# Patient Record
Sex: FEMALE | Race: BLACK OR AFRICAN AMERICAN | NOT HISPANIC OR LATINO | Employment: OTHER | URBAN - METROPOLITAN AREA
[De-identification: names, ages, dates, MRNs, and addresses within clinical notes are randomized per-mention and may not be internally consistent; named-entity substitution may affect disease eponyms.]

---

## 2019-07-25 ENCOUNTER — HOSPITAL ENCOUNTER (EMERGENCY)
Facility: HOSPITAL | Age: 84
Discharge: HOME | End: 2019-07-26
Attending: EMERGENCY MEDICINE | Admitting: EMERGENCY MEDICINE
Payer: MEDICARE

## 2019-07-25 VITALS
OXYGEN SATURATION: 100 % | WEIGHT: 110 LBS | RESPIRATION RATE: 63 BRPM | SYSTOLIC BLOOD PRESSURE: 162 MMHG | HEIGHT: 64 IN | BODY MASS INDEX: 18.78 KG/M2 | TEMPERATURE: 98 F | HEART RATE: 65 BPM | DIASTOLIC BLOOD PRESSURE: 75 MMHG

## 2019-07-25 DIAGNOSIS — R19.00 PELVIC MASS: ICD-10-CM

## 2019-07-25 DIAGNOSIS — R10.9 ABDOMINAL PAIN: ICD-10-CM

## 2019-07-25 DIAGNOSIS — N85.8 UTERINE MASS: Primary | ICD-10-CM

## 2019-07-25 LAB
ALBUMIN SERPL BCP-MCNC: 4 G/DL (ref 3.5–5.2)
ALP SERPL-CCNC: 101 U/L (ref 55–135)
ALT SERPL W/O P-5'-P-CCNC: 18 U/L (ref 10–44)
ANION GAP SERPL CALC-SCNC: 8 MMOL/L (ref 8–16)
AST SERPL-CCNC: 20 U/L (ref 10–40)
BASOPHILS # BLD AUTO: 0.02 K/UL (ref 0–0.2)
BASOPHILS NFR BLD: 0.6 % (ref 0–1.9)
BILIRUB SERPL-MCNC: 0.5 MG/DL (ref 0.1–1)
BILIRUB UR QL STRIP: NEGATIVE
BUN SERPL-MCNC: 11 MG/DL (ref 10–30)
CALCIUM SERPL-MCNC: 10.3 MG/DL (ref 8.7–10.5)
CHLORIDE SERPL-SCNC: 107 MMOL/L (ref 95–110)
CLARITY UR REFRACT.AUTO: ABNORMAL
CO2 SERPL-SCNC: 26 MMOL/L (ref 23–29)
COLOR UR AUTO: YELLOW
CREAT SERPL-MCNC: 0.6 MG/DL (ref 0.5–1.4)
DIFFERENTIAL METHOD: ABNORMAL
EOSINOPHIL # BLD AUTO: 0.1 K/UL (ref 0–0.5)
EOSINOPHIL NFR BLD: 3 % (ref 0–8)
ERYTHROCYTE [DISTWIDTH] IN BLOOD BY AUTOMATED COUNT: 14.8 % (ref 11.5–14.5)
EST. GFR  (AFRICAN AMERICAN): >60 ML/MIN/1.73 M^2
EST. GFR  (NON AFRICAN AMERICAN): >60 ML/MIN/1.73 M^2
GLUCOSE SERPL-MCNC: 86 MG/DL (ref 70–110)
GLUCOSE UR QL STRIP: NEGATIVE
HCT VFR BLD AUTO: 35.2 % (ref 37–48.5)
HGB BLD-MCNC: 11.1 G/DL (ref 12–16)
HGB UR QL STRIP: NEGATIVE
KETONES UR QL STRIP: NEGATIVE
LEUKOCYTE ESTERASE UR QL STRIP: NEGATIVE
LIPASE SERPL-CCNC: 27 U/L (ref 4–60)
LYMPHOCYTES # BLD AUTO: 1.6 K/UL (ref 1–4.8)
LYMPHOCYTES NFR BLD: 49.2 % (ref 18–48)
MCH RBC QN AUTO: 30.6 PG (ref 27–31)
MCHC RBC AUTO-ENTMCNC: 31.5 G/DL (ref 32–36)
MCV RBC AUTO: 97 FL (ref 82–98)
MONOCYTES # BLD AUTO: 0.3 K/UL (ref 0.3–1)
MONOCYTES NFR BLD: 8.8 % (ref 4–15)
NEUTROPHILS # BLD AUTO: 1.3 K/UL (ref 1.8–7.7)
NEUTROPHILS NFR BLD: 38.4 % (ref 38–73)
NITRITE UR QL STRIP: NEGATIVE
PH UR STRIP: 7 [PH] (ref 5–8)
PLATELET # BLD AUTO: 271 K/UL (ref 150–350)
PMV BLD AUTO: 9.9 FL (ref 9.2–12.9)
POTASSIUM SERPL-SCNC: 4.5 MMOL/L (ref 3.5–5.1)
PROT SERPL-MCNC: 7.1 G/DL (ref 6–8.4)
PROT UR QL STRIP: NEGATIVE
RBC # BLD AUTO: 3.63 M/UL (ref 4–5.4)
SODIUM SERPL-SCNC: 141 MMOL/L (ref 136–145)
SP GR UR STRIP: 1.01 (ref 1–1.03)
URN SPEC COLLECT METH UR: ABNORMAL
UROBILINOGEN UR STRIP-ACNC: NEGATIVE EU/DL
WBC # BLD AUTO: 3.29 K/UL (ref 3.9–12.7)

## 2019-07-25 PROCEDURE — 63600175 PHARM REV CODE 636 W HCPCS: Mod: ER | Performed by: EMERGENCY MEDICINE

## 2019-07-25 PROCEDURE — 81003 URINALYSIS AUTO W/O SCOPE: CPT | Mod: ER

## 2019-07-25 PROCEDURE — 93010 EKG 12-LEAD: ICD-10-PCS | Mod: ,,, | Performed by: INTERNAL MEDICINE

## 2019-07-25 PROCEDURE — 93005 ELECTROCARDIOGRAM TRACING: CPT | Mod: ER

## 2019-07-25 PROCEDURE — 99900035 HC TECH TIME PER 15 MIN (STAT): Mod: ER

## 2019-07-25 PROCEDURE — 83690 ASSAY OF LIPASE: CPT | Mod: ER

## 2019-07-25 PROCEDURE — 25500020 PHARM REV CODE 255: Mod: ER | Performed by: EMERGENCY MEDICINE

## 2019-07-25 PROCEDURE — 80053 COMPREHEN METABOLIC PANEL: CPT | Mod: ER

## 2019-07-25 PROCEDURE — 96360 HYDRATION IV INFUSION INIT: CPT

## 2019-07-25 PROCEDURE — 99285 EMERGENCY DEPT VISIT HI MDM: CPT | Mod: 25

## 2019-07-25 PROCEDURE — 85025 COMPLETE CBC W/AUTO DIFF WBC: CPT | Mod: ER

## 2019-07-25 PROCEDURE — 93010 ELECTROCARDIOGRAM REPORT: CPT | Mod: ,,, | Performed by: INTERNAL MEDICINE

## 2019-07-25 RX ORDER — FERROUS SULFATE 325(65) MG
1 TABLET ORAL DAILY
COMMUNITY
Start: 2015-09-02 | End: 2020-12-08

## 2019-07-25 RX ORDER — AMLODIPINE AND OLMESARTAN MEDOXOMIL 10; 20 MG/1; MG/1
1 TABLET ORAL DAILY
COMMUNITY
End: 2020-05-26 | Stop reason: SDUPTHER

## 2019-07-25 RX ORDER — ASPIRIN 81 MG/1
81 TABLET ORAL DAILY
COMMUNITY
End: 2021-05-31

## 2019-07-25 RX ORDER — TRAMADOL HYDROCHLORIDE 50 MG/1
50 TABLET ORAL EVERY 6 HOURS PRN
Qty: 18 TABLET | Refills: 0 | Status: SHIPPED | OUTPATIENT
Start: 2019-07-25 | End: 2019-07-25

## 2019-07-25 RX ORDER — HYDROCODONE BITARTRATE AND ACETAMINOPHEN 5; 325 MG/1; MG/1
1 TABLET ORAL EVERY 6 HOURS PRN
Qty: 18 TABLET | Refills: 0 | Status: SHIPPED | OUTPATIENT
Start: 2019-07-25 | End: 2020-05-26 | Stop reason: ALTCHOICE

## 2019-07-25 RX ADMIN — IOHEXOL 75 ML: 350 INJECTION, SOLUTION INTRAVENOUS at 04:07

## 2019-07-25 RX ADMIN — SODIUM CHLORIDE 500 ML: 0.9 INJECTION, SOLUTION INTRAVENOUS at 04:07

## 2019-07-25 NOTE — ED PROVIDER NOTES
Encounter Date: 7/25/2019       History     Chief Complaint   Patient presents with    Abdominal Pain     Onset last night. Denies N/V. Last BM 2 days ago. +Dysuria.      The history is provided by the patient.   Abdominal Pain   The current episode started two days ago. The onset of the illness was gradual. The problem has not changed since onset.The abdominal pain is generalized. The abdominal pain does not radiate. The severity of the abdominal pain is 2/10. The abdominal pain is relieved by nothing. The other symptoms of the illness include dysuria. The other symptoms of the illness do not include fever, fatigue, jaundice, melena, nausea, vomiting, diarrhea or hematemesis.   The dysuria began 2 days ago. The discomfort is mild.     Review of patient's allergies indicates:   Allergen Reactions    Pregabalin Swelling     Past Medical History:   Diagnosis Date    Hypertension      Past Surgical History:   Procedure Laterality Date    LEG AMPUTATION THROUGH KNEE Left      History reviewed. No pertinent family history.  Social History     Tobacco Use    Smoking status: Former Smoker    Smokeless tobacco: Never Used   Substance Use Topics    Alcohol use: Not Currently    Drug use: Not Currently     Review of Systems   Constitutional: Negative for fatigue and fever.   Gastrointestinal: Positive for abdominal pain. Negative for diarrhea, hematemesis, jaundice, melena, nausea and vomiting.   Genitourinary: Positive for dysuria.   All other systems reviewed and are negative.      Physical Exam     Initial Vitals [07/25/19 1413]   BP Pulse Resp Temp SpO2   (!) 194/117 90 16 98.2 °F (36.8 °C) 99 %      MAP       --         Physical Exam    Nursing note and vitals reviewed.  Constitutional: She appears well-developed and well-nourished.   HENT:   Head: Normocephalic and atraumatic.   Mouth/Throat: Oropharynx is clear and moist.   Eyes: EOM are normal. Pupils are equal, round, and reactive to light.   Neck: Normal  range of motion. Neck supple.   Cardiovascular: Normal rate, regular rhythm and normal heart sounds.   Pulmonary/Chest: Breath sounds normal. No respiratory distress.   Abdominal: Soft. Bowel sounds are normal. There is tenderness in the right lower quadrant, suprapubic area and left lower quadrant.   Musculoskeletal: Normal range of motion.   Neurological: She is alert and oriented to person, place, and time. She has normal strength.   Skin: Skin is warm and dry.   Psychiatric: She has a normal mood and affect. Thought content normal.         ED Course   Procedures  Labs Reviewed   CBC W/ AUTO DIFFERENTIAL - Abnormal; Notable for the following components:       Result Value    WBC 3.29 (*)     RBC 3.63 (*)     Hemoglobin 11.1 (*)     Hematocrit 35.2 (*)     Mean Corpuscular Hemoglobin Conc 31.5 (*)     RDW 14.8 (*)     Gran # (ANC) 1.3 (*)     Lymph% 49.2 (*)     All other components within normal limits   URINALYSIS, REFLEX TO URINE CULTURE - Abnormal; Notable for the following components:    Appearance, UA Hazy (*)     All other components within normal limits    Narrative:     Preferred Collection Type->Urine, Clean Catch   COMPREHENSIVE METABOLIC PANEL   LIPASE     Results for orders placed or performed during the hospital encounter of 07/25/19   CBC W/ AUTO DIFFERENTIAL   Result Value Ref Range    WBC 3.29 (L) 3.90 - 12.70 K/uL    RBC 3.63 (L) 4.00 - 5.40 M/uL    Hemoglobin 11.1 (L) 12.0 - 16.0 g/dL    Hematocrit 35.2 (L) 37.0 - 48.5 %    Mean Corpuscular Volume 97 82 - 98 fL    Mean Corpuscular Hemoglobin 30.6 27.0 - 31.0 pg    Mean Corpuscular Hemoglobin Conc 31.5 (L) 32.0 - 36.0 g/dL    RDW 14.8 (H) 11.5 - 14.5 %    Platelets 271 150 - 350 K/uL    MPV 9.9 9.2 - 12.9 fL    Gran # (ANC) 1.3 (L) 1.8 - 7.7 K/uL    Lymph # 1.6 1.0 - 4.8 K/uL    Mono # 0.3 0.3 - 1.0 K/uL    Eos # 0.1 0.0 - 0.5 K/uL    Baso # 0.02 0.00 - 0.20 K/uL    Gran% 38.4 38.0 - 73.0 %    Lymph% 49.2 (H) 18.0 - 48.0 %    Mono% 8.8 4.0 - 15.0  %    Eosinophil% 3.0 0.0 - 8.0 %    Basophil% 0.6 0.0 - 1.9 %    Differential Method Automated    Comp. Metabolic Panel   Result Value Ref Range    Sodium 141 136 - 145 mmol/L    Potassium 4.5 3.5 - 5.1 mmol/L    Chloride 107 95 - 110 mmol/L    CO2 26 23 - 29 mmol/L    Glucose 86 70 - 110 mg/dL    BUN, Bld 11 10 - 30 mg/dL    Creatinine 0.6 0.5 - 1.4 mg/dL    Calcium 10.3 8.7 - 10.5 mg/dL    Total Protein 7.1 6.0 - 8.4 g/dL    Albumin 4.0 3.5 - 5.2 g/dL    Total Bilirubin 0.5 0.1 - 1.0 mg/dL    Alkaline Phosphatase 101 55 - 135 U/L    AST 20 10 - 40 U/L    ALT 18 10 - 44 U/L    Anion Gap 8 8 - 16 mmol/L    eGFR if African American >60.0 >60 mL/min/1.73 m^2    eGFR if non African American >60.0 >60 mL/min/1.73 m^2   Lipase   Result Value Ref Range    Lipase 27 4 - 60 U/L   Urinalysis, Reflex to Urine Culture Urine, Clean Catch   Result Value Ref Range    Specimen UA Urine, Clean Catch     Color, UA Yellow Yellow, Straw, Humera    Appearance, UA Hazy (A) Clear    pH, UA 7.0 5.0 - 8.0    Specific Gravity, UA 1.010 1.005 - 1.030    Protein, UA Negative Negative    Glucose, UA Negative Negative    Ketones, UA Negative Negative    Bilirubin (UA) Negative Negative    Occult Blood UA Negative Negative    Nitrite, UA Negative Negative    Urobilinogen, UA Negative <2.0 EU/dL    Leukocytes, UA Negative Negative          ECG Results          EKG 12-lead (In process)  Result time 07/25/19 14:47:39    In process by Interface, Lab In [facility] (07/25/19 14:47:39)                 Narrative:    Test Reason : R10.9,    Vent. Rate : 072 BPM     Atrial Rate : 072 BPM     P-R Int : 130 ms          QRS Dur : 082 ms      QT Int : 380 ms       P-R-T Axes : 086 048 074 degrees     QTc Int : 416 ms    Normal sinus rhythm  Normal ECG  No previous ECGs available    Referred By: [email or web address]   SELF           Confirmed By:                             Imaging Results          CT Abdomen Pelvis With Contrast (Final result)  Result time 07/25/19 16:26:59     Final result by John Franks MD (07/25/19 16:26:59)                 Impression:      Thick-walled cystic lesion is seen within the right adnexal region which may be a cystic ovarian mass versus torsed ovary or uterine mass. There is associated nodularity and some calcifications.  Exact epicenter is not clearly delineated. Recommend follow-up pelvic ultrasound.    Heterogeneous area of attenuation is seen within segment 5 on images 71 sequence 2 measuring over a region of approximately 2.7 cm. Underlying lesion is not excluded. Recommend follow-up ultrasound.  Moderate amount of fluid is seen within the right lower quadrant.    Soft tissue thickening is seen within the sacral region. Recommend direct visualization to exclude sacral decubitus. There is also soft tissue thickening in the left gluteal region. Again recommend direct visualization.    Moderate constipation.    No evidence of acute appendicitis or diverticulitis.    All CT scans at this facility use dose modulation, iterative reconstruction, and/or weight based dosing when appropriate to reduce radiation dose to as low as reasonable achievable.      Electronically signed by: John Franks MD  Date:    07/25/2019  Time:    16:26             Narrative:    EXAMINATION:  CT ABDOMEN PELVIS WITH CONTRAST    CLINICAL HISTORY:  LLQ pain, suspect diverticulitis;    TECHNIQUE:  Low dose axial images, sagittal and coronal reformations were obtained from the lung bases to the pubic symphysis following the IV administration of 75 mL of Omnipaque 350.  Oral contrast was not administered.    COMPARISON:  None    FINDINGS:  Heart: Normal size. No effusion.    Lung Bases: Clear.  Atelectasis or scarring is seen at the lung bases.    Liver: Normal size and attenuation.  Heterogeneous area of attenuation is seen within segment 5 on images 71 sequence 2 measuring over a region of approximately 2.7 cm.  Underlying lesion is not excluded.  Recommend follow-up  ultrasound.    Gallbladder: No calcified gallstones.    Bile Ducts: Mild prominence of the common bile duct which appears appropriate for age measuring up to 6 mm.  Pancreatic duct demonstrates mild prominence as well measuring up to 4 mm.  No pancreatic mass is identified.    Pancreas: No mass. No peripancreatic fat stranding.    Spleen: Normal.    Adrenals: Normal.    Kidneys/Ureters: Normal enhancement.  No mass or  hydroureteronephrosis.    Bladder: No wall thickening.    Reproductive organs: Thick-walled cystic lesion measuring 5.6 x 4.8 cm is seen within the right adnexal region which may be a cystic ovarian mass versus torsed ovary or uterine mass. There is associated nodularity and some calcifications.  Exact epicenter is not clearly delineated. Recommend follow-up pelvic ultrasound.    GI Tract/Mesentery: No evidence of bowel obstruction or inflammation.  Moderate constipation.  Scattered diverticulosis within the sigmoid colon without evidence of diverticulitis.  Appendix is normal.    Peritoneal Space: Moderate amount of ascites is seen within the right lower quadrant.    Retroperitoneum: No significant adenopathy.    Abdominal wall: Mild anasarca.  Soft tissue thickening is seen within the sacral region.  Recommend direct visualization to exclude sacral decubitus.  There is also soft tissue thickening in the left gluteal region.  Again recommend direct visualization.    Vasculature: No aneurysm. Aorta demonstrates moderate atherosclerotic disease.    Bones: Diffuse osteopenia and advanced degenerative changes lower lumbar spine.  Vertebral body heights are maintained.                            4:54 PM Discussed lab/imaging studies with patient and the need for further evaluation/admission for Evaluation of Pelvic mass with US. Pt verbalized understanding that this is a stand alone ER and we are unable to admit at this facility. Pt will be [facility] to [facility] with care en  route to include IVfs. I discussed this case with ER and care was accepted by Dr Esquivel.    We do not have US coverage at this facility at this time. CT scan recs US to rule out torsion vs Pelvic mass.                          Clinical Impression:       ICD-10-CM ICD-9-CM   1. Abdominal pain R10.9 789.00   2. Pelvic mass R19.00 789.30         Disposition:   Disposition: Discharged  Condition: Stable                        Timur Parr MD  07/27/19 0815

## 2019-07-26 ENCOUNTER — TELEPHONE (OUTPATIENT)
Dept: OBSTETRICS AND GYNECOLOGY | Facility: CLINIC | Age: 84
End: 2019-07-26

## 2019-07-26 NOTE — ED NOTES
Pt. Arrives to ER as transfer, reports abdominal pain to LLQ, CT performed pt. Sent over for further studies. Currently reporting tenderness to LLQ, some nausea denies v/d rates pain 7/10. Family at bedside.

## 2019-07-26 NOTE — TELEPHONE ENCOUNTER
----- Message from Jose Salter sent at 7/26/2019  2:40 PM CDT -----  Contact: [phone]  Pt would like a call back to schedule an appt for a hospital follow with Sherwin Bond. Please call back at [phone].     Thank You,   Jose Salter

## 2019-07-26 NOTE — PROVIDER PROGRESS NOTES - EMERGENCY DEPT.
Encounter Date: 7/25/2019    ED Physician Progress Notes       SCRIBE NOTE: I, Eulalia Corcoran, am scribing for, and in the presence of,  Timur Parr MD.  Physician Statement: ITimur MD, personally performed the services described in this documentation as scribed by Eulalia Corcoran in my presence, and it is both accurate and complete.          Pt was evaluated in [facility] for abd pain. She was transferred here for US.     8:41 PM: Dr. Parr evaluated pt. She states her abd pain has slightly improved. It's still present.     11:42 PM: Reassessed pt at this time.  Pt states her condition has improved at this time. Discussed with pt all pertinent ED information and results. Discussed pt dx and plan of tx. Gave pt all f/u and return to the ED instructions. All questions and concerns were addressed at this time. Pt expresses understanding of information and instructions, and is comfortable with plan to discharge. Pt is stable for discharge.    I discussed with patient and/or family/caretaker that evaluation in the ED does not suggest any emergent or life threatening medical conditions requiring immediate intervention beyond what was provided in the ED, and I believe patient is safe for discharge.  Regardless, an unremarkable evaluation in the ED does not preclude the development or presence of a serious of life threatening condition. As such, patient was instructed to return immediately for any worsening or change in current symptoms.    ED Medication(s):  Medications   sodium chloride 0.9% bolus 500 mL (0 mLs Intravenous Stopped 7/25/19 1700)   iohexol (OMNIPAQUE 350) injection 75 mL (75 mLs Intravenous Given 7/25/19 1608)       New Prescriptions    TRAMADOL (ULTRAM) 50 MG TABLET    Take 1 tablet (50 mg total) by mouth every 6 (six) hours as needed for Pain.       Follow-up Information     Geronimo Rios MD. Schedule an appointment as soon as possible for a visit in 1 week.    Specialty:  Family  Medicine  Contact information:  [address]  [facility] [place] [address]  [phone]             Sherwin Bond MD. Schedule an appointment as soon as possible for a visit in 5 days.    Specialty:  Obstetrics and Gynecology  Contact information:  [address]  [place] [address]  [phone]                      Medical Decision Making:   Clinical Tests:   Radiological Study: Ordered and Reviewed             Scribe Attestation:   Scribe #1: I performed the above scribed service and the documentation accurately describes the services I performed. I attest to the accuracy of the note.     Attending:   Physician Attestation Statement for Scribe #1: I, Timur Parr MD, personally performed the services described in this documentation, as scribed by Eulalia Corcoran, in my presence, and it is both accurate and complete.           Clinical Impression       ICD-10-CM ICD-9-CM   1. Uterine mass N85.9 625.8   2. Abdominal pain R10.9 789.00   3. Pelvic mass R19.00 789.30       Disposition:   Disposition: Discharged  Condition: Stable

## 2019-07-29 ENCOUNTER — TELEPHONE (OUTPATIENT)
Dept: OBSTETRICS AND GYNECOLOGY | Facility: CLINIC | Age: 84
End: 2019-07-29

## 2019-07-29 NOTE — TELEPHONE ENCOUNTER
----- Message from Chandrika Cruz sent at 7/29/2019  9:32 AM CDT -----  Contact: pt  States she needs to schedule with Dr Bond. Please call pt at [phone]. Thank you

## 2019-07-29 NOTE — TELEPHONE ENCOUNTER
Spoke to patient and scheduled her appointment for 07/30/19 at 1:15pm to see Dr. Bond at the [place] location. Patient verbalized understanding.

## 2019-08-06 ENCOUNTER — TELEPHONE (OUTPATIENT)
Dept: OBSTETRICS AND GYNECOLOGY | Facility: CLINIC | Age: 84
End: 2019-08-06

## 2019-08-06 NOTE — TELEPHONE ENCOUNTER
----- Message from Suyapa Pimentel sent at 8/6/2019 11:36 AM CDT -----  Contact: pt  Needs to reschedule [phone]

## 2019-08-06 NOTE — TELEPHONE ENCOUNTER
Returned call to patient.  She requested to reschedule her appt due to having wound care and in pain today, per patient.  Appt scheduled 08/13/19 at 2:15 pm, she confirmed appt

## 2019-08-09 ENCOUNTER — TELEPHONE (OUTPATIENT)
Dept: OBSTETRICS AND GYNECOLOGY | Facility: CLINIC | Age: 84
End: 2019-08-09

## 2019-08-09 NOTE — TELEPHONE ENCOUNTER
----- Message from Heriberto Akins sent at 8/9/2019  7:45 AM CDT -----  Contact: Pt  Pt called in regards to a question about her first appointment. Pt wanted to know if she needed to wear anything in particular. Pt can be reached at [phone].

## 2019-08-12 ENCOUNTER — TELEPHONE (OUTPATIENT)
Dept: OBSTETRICS AND GYNECOLOGY | Facility: CLINIC | Age: 84
End: 2019-08-12

## 2019-08-12 NOTE — TELEPHONE ENCOUNTER
----- Message from Guilherme Unger sent at 8/12/2019  1:27 PM CDT -----  Contact: Pt  Pt is calling staff regarding if there are special clothing that pt wear for appt  Pt call back to be advised  [phone]  Thanks

## 2019-08-21 ENCOUNTER — OFFICE VISIT (OUTPATIENT)
Dept: OBSTETRICS AND GYNECOLOGY | Facility: CLINIC | Age: 84
End: 2019-08-21
Payer: MEDICARE

## 2019-08-21 VITALS
BODY MASS INDEX: 18.78 KG/M2 | HEIGHT: 64 IN | DIASTOLIC BLOOD PRESSURE: 80 MMHG | SYSTOLIC BLOOD PRESSURE: 116 MMHG | WEIGHT: 110 LBS

## 2019-08-21 DIAGNOSIS — N85.8 UTERINE MASS: Primary | ICD-10-CM

## 2019-08-21 PROCEDURE — 99213 OFFICE O/P EST LOW 20 MIN: CPT | Mod: PBBFAC | Performed by: OBSTETRICS & GYNECOLOGY

## 2019-08-21 PROCEDURE — 99203 OFFICE O/P NEW LOW 30 MIN: CPT | Mod: S$PBB,,, | Performed by: OBSTETRICS & GYNECOLOGY

## 2019-08-21 PROCEDURE — 99203 PR OFFICE/OUTPT VISIT, NEW, LEVL III, 30-44 MIN: ICD-10-PCS | Mod: S$PBB,,, | Performed by: OBSTETRICS & GYNECOLOGY

## 2019-08-21 PROCEDURE — 99999 PR PBB SHADOW E&M-EST. PATIENT-LVL III: CPT | Mod: PBBFAC,,, | Performed by: OBSTETRICS & GYNECOLOGY

## 2019-08-21 PROCEDURE — 99999 PR PBB SHADOW E&M-EST. PATIENT-LVL III: ICD-10-PCS | Mod: PBBFAC,,, | Performed by: OBSTETRICS & GYNECOLOGY

## 2019-08-21 RX ORDER — MISOPROSTOL 200 UG/1
200 TABLET ORAL ONCE
Qty: 2 TABLET | Refills: 0 | Status: SHIPPED | OUTPATIENT
Start: 2019-08-21 | End: 2020-05-26 | Stop reason: ALTCHOICE

## 2019-08-21 NOTE — PATIENT INSTRUCTIONS
Endometrial Biopsy    Endometrial biopsy is a procedure used to study the endometrium (lining of the uterus). It is usually done in your health care providers office. During the biopsy, small tissue samples are taken from inside the uterus. These are then sent to a lab for study. If any problems are found, you and your health care provider will discuss treatment options. The biopsy usually takes only a few minutes, and you can often go back to your normal routine as soon as the procedure is over.  Reasons for the procedure  Endometrial biopsy may help pinpoint the cause of certain problems. These include:  · Bleeding after menopause  · Heavy or irregular menstrual periods  · Bleeding associated with hormone therapy  · Prolonged bleeding  · Abnormal Pap test results  · Having certain types of cancer  · Trouble getting pregnant (fertility problems)  What are the risks?  Problems with endometrial biopsy are rare, but can include:  · Bleeding  · Infection  · Damage to the uterine wall (very rare)  Getting ready for the procedure  Your health care provider will ask about your health and any medicines you take, like blood thinners. Before your biopsy, you may have tests to make sure youre not pregnant or have an infection. You may also be asked to sign a consent form. A day or 2 before the procedure:   · Avoid using creams or other vaginal medicines.  · Avoid douching.  · Ask your health care provider if you should take pain medicines shortly before the test.  During the biopsy  During the biopsy, you will likely experience the following:  · You will be asked to lie on an exam table with your knees bent, just as you do for a Pap test.  · You may have a brief pelvic exam. An instrument called a speculum is then inserted into the vagina to hold it open.  · An antiseptic solution may be applied to the cervix. The cervix may also be numbed with an anesthetic or dilated to widen the opening.  · A small tube is passed  through the cervix into the uterus.  · It is normal to feel some cramping when the tube is inserted. But tell your health care provider if you have severe cramping or are very uncomfortable.  · Using mild suction, samples are taken from the uterine lining. You may feel pinching or additional cramping when this is done.  · The tube and speculum are then removed and the samples are sent to a lab for study.  After the procedure  After the procedure, you may experience the following:  · If you feel lightheaded or dizzy, you can rest on the table until youre ready to get dressed.  · For a few hours, you may feel some mild cramping. This can usually be relieved with over-the-counter pain medicines.  · You may have some bleeding for a few days. Use pads instead of tampons.  · Dont douche or use any vaginal medicines unless your health care provider says its OK.  · Ask your health care provider when its OK to have sex again.  Follow-up care  It will take about a week for the biopsy results to come back from the lab. Then you and your health care provider can discuss the results. These may show that no treatment is required. Or, you may be scheduled for a follow-up appointment and more tests. If your biopsy was done for fertility problems, be sure to record the day when your next period begins.     Call your health care provider   Contact your health care provider if you have any of the following:  · Heavy bleeding (more than a pad an hour for 2 hours).  · Severe cramping or increasing pain.  · Fever over 100.4°F (38.0°C)  · Foul-smelling or unusual vaginal discharge.   Date Last Reviewed: 5/10/2015  © 1946-9110 [employer]. [address] [place] [address], [place] [address]. All rights reserved. This information is not intended as a substitute for professional medical care. Always follow your healthcare professional's instructions.

## 2019-08-22 ENCOUNTER — TELEPHONE (OUTPATIENT)
Dept: OBSTETRICS AND GYNECOLOGY | Facility: CLINIC | Age: 84
End: 2019-08-22

## 2019-08-22 NOTE — TELEPHONE ENCOUNTER
Patient rescheduled her appointment on 8/28/2019 from 2:00 pm to  11:45 am . Patient notified and verbalized understanding.

## 2019-08-22 NOTE — TELEPHONE ENCOUNTER
----- Message from Chandrika Cruz sent at 8/22/2019 10:29 AM CDT -----  Contact: PT  States would like to reschedule her procedure for the 10:00 appt on 8/28. Please call pt at [phone]. Thank you

## 2019-08-26 NOTE — PROGRESS NOTES
"Subjective:       Pamella Parikh is a 89 y.o. female who presents for evaluation of abdominal pain. The pain is described as sharp and shooting, and is 8/10 in intensity. Pain is located in the RLQ and LLQ area without radiation. Onset was insidious occurring several days ago. Symptoms have been gradually improving since. Aggravating factors: none. Alleviating factors: none. Associated symptoms: none. The patient denies constipation, diarrhea, nausea and vomiting. Risk factors for pelvic/abdominal pain include none.  Patient is accompynied by her daughter, her daughter is the historian.  Patient was seen in the ED for worsening abdominal pain, was found to have a mass on CT scan that was possible ovarian in origin.  Follow ultrasound showed that the mass was her uterus, ovaries are fine but is filled with fluid.  Patient has lost a lot of weight recently.  The CT scan did not show any other abnormalities, or enlarged lymph nodes.     Menstrual History:  OB History    None        Menarche age: not asked  No LMP recorded (lmp unknown). Patient is postmenopausal.            Review of Systems  Constitutional: positive for weight loss  Eyes: negative  Ears, nose, mouth, throat, and face: negative  Respiratory: negative  Cardiovascular: negative  Gastrointestinal: negative  Genitourinary:negative  Integument/breast: negative  Hematologic/lymphatic: negative  Musculoskeletal:negative  Neurological: positive for memory problems  Behavioral/Psych: negative  Endocrine: negative  Allergic/Immunologic: negative      Objective:      /80   Ht 5' 4" (1.626 m)   Wt 49.9 kg (110 lb 0.2 oz)   LMP  (LMP Unknown)   BMI 18.88 kg/m²   General:   alert, cachectic and cooperative   Lungs:   clear to auscultation bilaterally   Heart:   S1, S2 normal and no S3 or S4   Abdomen:  soft, non-tender; bowel sounds normal; no masses,  no organomegaly   CVA:   absent   Pelvis:  Exam deferred.   Extremities:   extremities normal, atraumatic, " no cyanosis or edema   Neurologic:   negative   Psychiatric:   slow to respond, sleepy, daughter is the historian     Lab Review  Labs: CBC     Imaging  Ultrasound - Pelvic Vaginal, Ultrasound - Pelvic Abdominal, CT - Abdominal with contrast      Assessment:      uterine mass      Plan:      RTO for endometrial biopsy

## 2019-08-28 ENCOUNTER — PROCEDURE VISIT (OUTPATIENT)
Dept: OBSTETRICS AND GYNECOLOGY | Facility: CLINIC | Age: 84
End: 2019-08-28
Payer: MEDICARE

## 2019-08-28 ENCOUNTER — TELEPHONE (OUTPATIENT)
Dept: OBSTETRICS AND GYNECOLOGY | Facility: CLINIC | Age: 84
End: 2019-08-28

## 2019-08-28 VITALS
HEIGHT: 64 IN | WEIGHT: 110 LBS | BODY MASS INDEX: 18.78 KG/M2 | DIASTOLIC BLOOD PRESSURE: 80 MMHG | SYSTOLIC BLOOD PRESSURE: 118 MMHG

## 2019-08-28 DIAGNOSIS — N85.8 UTERINE MASS: Primary | ICD-10-CM

## 2019-08-28 PROCEDURE — 58100 BIOPSY OF UTERUS LINING: CPT | Mod: S$PBB,,, | Performed by: OBSTETRICS & GYNECOLOGY

## 2019-08-28 PROCEDURE — 88305 TISSUE SPECIMEN TO PATHOLOGY, OBSTETRICS/GYNECOLOGY: ICD-10-PCS | Mod: 26,,, | Performed by: PATHOLOGY

## 2019-08-28 PROCEDURE — 88305 TISSUE EXAM BY PATHOLOGIST: CPT | Performed by: PATHOLOGY

## 2019-08-28 PROCEDURE — 58100 BIOPSY OF UTERUS LINING: CPT | Mod: PBBFAC | Performed by: OBSTETRICS & GYNECOLOGY

## 2019-08-28 PROCEDURE — 58100 PR BIOPSY OF UTERUS LINING: ICD-10-PCS | Mod: S$PBB,,, | Performed by: OBSTETRICS & GYNECOLOGY

## 2019-08-28 PROCEDURE — 88305 TISSUE EXAM BY PATHOLOGIST: CPT | Mod: 26,,, | Performed by: PATHOLOGY

## 2019-08-28 RX ORDER — DEXTROMETHORPHAN HYDROBROMIDE, GUAIFENESIN 5; 100 MG/5ML; MG/5ML
650 LIQUID ORAL EVERY 8 HOURS PRN
COMMUNITY
End: 2021-05-31

## 2019-08-28 NOTE — TELEPHONE ENCOUNTER
----- Message from Rhonda Ruiz sent at 8/28/2019  8:04 AM CDT -----  Contact: self [phone]  Pt would like return call regarding questions about procedure scheduled for today. Please call back at [phone]. Md Amy

## 2019-08-28 NOTE — TELEPHONE ENCOUNTER
Pt. Had questions about how long procedure would take answered all patients questions. Pt. Voiced understanding.

## 2019-09-02 NOTE — PROCEDURES
Biopsy (Gynecological)  Date/Time: 8/28/2019 3:57 PM  Performed by: Sherwin Bond MD  Authorized by: Sherwin Bond MD     Consent Done?:  Yes (Verbal)   Patient was prepped and draped in the normal sterile fashion.  Local anesthesia used?: No      Biopsy Location:  Uterus   Patient tolerated the procedure well with no immediate complications.      Endometrial Biopsy Procedure Note    Name of Provider: Dr. Bond    Procedure Details  Urine pregnancy test not done  .  The risks (including infection, bleeding, pain, and uterine perforation) and benefits of the procedure were explained to the patient and Verbal informed consent was obtained.  Antibiotic prophylaxis against endocarditis was not indicated.     The patient was placed in the dorsal lithotomy position.  Bimanual exam showed the uterus to be in the anteroflexed position.  A Graves' speculum inserted in the vagina, and the cervix prepped with povidone iodine.  Endocervical curettage with a Kevorkian curette was not performed.     A sharp tenaculum was applied to the anterior lip of the cervix for stabilization.  A sterile uterine sound was used to sound the uterus to a depth of not done cm.  A Pipelle endometrial aspirator was used to sample the endometrium.  Sample was sent for pathologic examination.    Condition:  Stable    Complications:  None    Plan:    The patient was advised to call for any fever or for prolonged or severe pain or bleeding. She was advised to use OTC ibuprofen as needed for mild to moderate pain. She was advised to avoid vaginal intercourse for 48 hours or until the bleeding has completely stopped.    Attending Physician Documentation:  I was present for or performed the following: I performed the procedure     Specimen: endometrial curtettings  Post Op Diagnosis: uterine mass

## 2019-09-10 ENCOUNTER — TELEPHONE (OUTPATIENT)
Dept: OBSTETRICS AND GYNECOLOGY | Facility: CLINIC | Age: 84
End: 2019-09-10

## 2019-09-10 DIAGNOSIS — N85.8 UTERINE MASS: Primary | ICD-10-CM

## 2019-09-10 NOTE — TELEPHONE ENCOUNTER
Dr. Bond would like this pt. To have a consult for weight loss and fluid filled/ dilated uterus. Can you assist?

## 2019-09-11 ENCOUNTER — TELEPHONE (OUTPATIENT)
Dept: OBSTETRICS AND GYNECOLOGY | Facility: CLINIC | Age: 84
End: 2019-09-11

## 2019-09-11 ENCOUNTER — TELEPHONE (OUTPATIENT)
Dept: ADMINISTRATIVE | Facility: OTHER | Age: 84
End: 2019-09-11

## 2019-09-11 NOTE — TELEPHONE ENCOUNTER
----- Message from Sherie Desai sent at 9/11/2019  2:53 PM CDT -----  Contact: Pt   Pt I calling .Type:  Patient Returning Call    Who Called:Pt   Who Left Message for Patient: nurse   Does the patient know what this is regarding? Concerning schedule an appt to see an [place] Doctor   Would the patient rather a call back or a response via [facility]? Nurse   Best Call Back Number: [phone]    .Thank You  Sherie Desai

## 2019-09-11 NOTE — TELEPHONE ENCOUNTER
Spoke with pt. Pt would like a sooner appt with GYN/ONC. Ok with going to [facility]. Please assist in scheduling.

## 2019-09-11 NOTE — TELEPHONE ENCOUNTER
----- Message from Anny Johnson sent at 9/11/2019  8:56 AM CDT -----  Contact: Pt  Please contact pt at ([phone])

## 2019-09-11 NOTE — TELEPHONE ENCOUNTER
----- Message from Magda Haddad sent at 9/11/2019 10:17 AM CDT -----  Contact: pt  Type:  Patient Returning Call    Who Called:Patient  Who Left Message for Patient:Bertha  Does the patient know what this is regarding?:na  Would the patient rather a call back or a response via [facility]? Call back  Best Call Back Number:[phone]  Additional Information: na

## 2019-09-20 ENCOUNTER — TELEPHONE (OUTPATIENT)
Dept: ADMINISTRATIVE | Facility: OTHER | Age: 84
End: 2019-09-20

## 2019-09-22 ENCOUNTER — DOCUMENTATION ONLY (OUTPATIENT)
Dept: GYNECOLOGIC ONCOLOGY | Facility: CLINIC | Age: 84
End: 2019-09-22

## 2019-09-23 ENCOUNTER — TELEPHONE (OUTPATIENT)
Dept: ADMINISTRATIVE | Facility: OTHER | Age: 84
End: 2019-09-23

## 2019-09-23 NOTE — PROGRESS NOTES
Referred by Dr. Craig Bond for dilated uterus.     Daughter is historian.   Patient endorsed pelvic pain. Patient was seen in the ED for worsening abdominal pain, was found to have a mass on CT scan that was possible ovarian in origin.  Follow ultrasound showed that the mass was her uterus, ovaries are fine but is filled with fluid.  Patient has lost a lot of weight recently. CT otherwise shows no adenopathy, peritoneal implants, or omental caking, no liver lesions.     Pelvic US 7/25/19  FINDINGS:  Uterus:  Size: 8.5 x 5.6 x 5.7 cm  The pelvic cystic lesion described on CT abdomen pelvis from earlier today 07/25/2019 most likely represents a fluid-filled dilated uterus.  There is a fluid fluid level within the uterus, with mural nodularity.  Findings are nonspecific however neoplastic process is suspected.  Consider endometrial biopsy on a nonemergent basis or as clinically warranted.  Right ovary: 1.9 x 1 x 1.5 cm.  No torsion.  Left ovary: Not visualized.  Free Fluid: Moderate volume free fluid in the abdomen pelvis.    EMB 8/28/19 was non-diagnostic, no endometrial tissue identified.     Labs: 7/25/19 Cr 0.6 Alb 4.0 Hgb 11.1 Plts 271    Prior abdominal surgeries include c/s. PVD with left BKA  Colonoscopy  Family history

## 2019-09-25 ENCOUNTER — EXTERNAL HOME HEALTH (OUTPATIENT)
Dept: HOME HEALTH SERVICES | Facility: HOSPITAL | Age: 84
End: 2019-09-25
Payer: MEDICARE

## 2019-10-03 ENCOUNTER — TELEPHONE (OUTPATIENT)
Dept: GYNECOLOGIC ONCOLOGY | Facility: CLINIC | Age: 84
End: 2019-10-03

## 2019-10-03 NOTE — TELEPHONE ENCOUNTER
Spoke with pt. Discussed what a consult would be with Dr Thorne. Pt verbalized understanding and a new appointment reminder placed in the mail.   ----- Message from Jacinda Lopez sent at 10/3/2019 11:11 AM CDT -----  Contact: SHERINE WRIGHT [[id number]]  Name of Who is Calling: SHERINE WRIGHT [[id number]]    What is the request in detail: patient would like a call back states she has questions regarding consult visit on 10/16. Please call to discuss       Can the clinic reply by [facility]: no    What Number to Call Back if not in [facility]: [phone]

## 2019-10-15 ENCOUNTER — TELEPHONE (OUTPATIENT)
Dept: GYNECOLOGIC ONCOLOGY | Facility: CLINIC | Age: 84
End: 2019-10-15

## 2019-10-16 ENCOUNTER — INITIAL CONSULT (OUTPATIENT)
Dept: GYNECOLOGIC ONCOLOGY | Facility: CLINIC | Age: 84
End: 2019-10-16
Payer: MEDICARE

## 2019-10-16 VITALS
BODY MASS INDEX: 14.15 KG/M2 | HEART RATE: 77 BPM | HEIGHT: 64 IN | SYSTOLIC BLOOD PRESSURE: 172 MMHG | WEIGHT: 82.88 LBS | DIASTOLIC BLOOD PRESSURE: 59 MMHG

## 2019-10-16 DIAGNOSIS — I73.9 PERIPHERAL VASCULAR DISEASE: ICD-10-CM

## 2019-10-16 DIAGNOSIS — I10 HYPERTENSION, UNSPECIFIED TYPE: ICD-10-CM

## 2019-10-16 DIAGNOSIS — R93.89 THICKENED ENDOMETRIUM: ICD-10-CM

## 2019-10-16 PROCEDURE — 99999 PR PBB SHADOW E&M-EST. PATIENT-LVL II: ICD-10-PCS | Mod: PBBFAC,,, | Performed by: OBSTETRICS & GYNECOLOGY

## 2019-10-16 PROCEDURE — 99205 PR OFFICE/OUTPT VISIT, NEW, LEVL V, 60-74 MIN: ICD-10-PCS | Mod: S$PBB,,, | Performed by: OBSTETRICS & GYNECOLOGY

## 2019-10-16 PROCEDURE — 99205 OFFICE O/P NEW HI 60 MIN: CPT | Mod: S$PBB,,, | Performed by: OBSTETRICS & GYNECOLOGY

## 2019-10-16 PROCEDURE — 99212 OFFICE O/P EST SF 10 MIN: CPT | Mod: PBBFAC | Performed by: OBSTETRICS & GYNECOLOGY

## 2019-10-16 PROCEDURE — 99999 PR PBB SHADOW E&M-EST. PATIENT-LVL II: CPT | Mod: PBBFAC,,, | Performed by: OBSTETRICS & GYNECOLOGY

## 2019-10-16 NOTE — LETTER
October 25, 2019      Sherwin Bond MD  [address] [facility]  [place] [address]           [facility] [address] [facility] [address]  [address], [address]  [facility] [address]  Phone: [phone]  Fax: [phone]          Patient: Pamella Parikh   MR Number: [id number]   YOB: 1927   Date of Visit: 10/16/2019       Dear Dr. Sherwin Bond:    Thank you for referring Pamella Parikh to me for evaluation. Attached you will find relevant portions of my assessment and plan of care.    If you have questions, please do not hesitate to call me. I look forward to following Pamella Parikh along with you.    Sincerely,    Ximena Thorne MD    Enclosure  CC:  No Recipients    If you would like to receive this communication electronically, please contact [email or web address] or [phone] to request more information on [facility] access.    For providers and/or their staff who would like to refer a patient to [facility], please contact us through our one-stop-shop provider referral line, [facility], at [phone].    If you feel you have received this communication in error or would no longer like to receive these types of communications, please e-mail [email or web address]

## 2019-10-25 ENCOUNTER — TELEPHONE (OUTPATIENT)
Dept: HEMATOLOGY/ONCOLOGY | Facility: CLINIC | Age: 84
End: 2019-10-25

## 2019-10-25 DIAGNOSIS — R93.89 THICKENED ENDOMETRIUM: Primary | ICD-10-CM

## 2019-10-25 PROBLEM — I10 HYPERTENSION: Status: ACTIVE | Noted: 2019-10-25

## 2019-10-25 PROBLEM — I73.9 PERIPHERAL VASCULAR DISEASE: Status: ACTIVE | Noted: 2019-10-25

## 2019-10-25 NOTE — H&P (VIEW-ONLY)
Subjective:      Patient ID: Pamella Parikh is a 89 y.o. female.    Chief Complaint: No chief complaint on file.      HPI  Referred by Dr. Craig Bond for dilated uterus.      Daughter is historian.   Patient endorsed pelvic pain. Patient was seen in the ED for worsening abdominal pain, was found to have a mass on CT scan that was possible ovarian in origin.  Follow ultrasound showed that the mass was her uterus, ovaries are fine but is filled with fluid.  Patient has lost a lot of weight recently. CT otherwise shows no adenopathy, peritoneal implants, or omental caking, no liver lesions.      Pelvic US 7/25/19  FINDINGS:  Uterus:  Size: 8.5 x 5.6 x 5.7 cm  The pelvic cystic lesion described on CT abdomen pelvis from earlier today 07/25/2019 most likely represents a fluid-filled dilated uterus.  There is a fluid fluid level within the uterus, with mural nodularity.  Findings are nonspecific however neoplastic process is suspected.  Consider endometrial biopsy on a nonemergent basis or as clinically warranted.  Right ovary: 1.9 x 1 x 1.5 cm.  No torsion.  Left ovary: Not visualized.  Free Fluid: Moderate volume free fluid in the abdomen pelvis.     EMB 8/28/19 was non-diagnostic, no endometrial tissue identified.      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    Review of Systems   Constitutional: Negative for appetite change, chills, fatigue and fever.   HENT: Negative for mouth sores.    Respiratory: Negative for cough and shortness of breath.    Cardiovascular: Negative for leg swelling.   Gastrointestinal: Negative for abdominal pain, blood in stool, constipation and diarrhea.   Endocrine: Negative for cold intolerance.   Genitourinary: Negative for dysuria and vaginal bleeding.   Musculoskeletal: Negative for myalgias.   Skin: Negative for rash.   Allergic/Immunologic: Negative.    Neurological: Negative for weakness and numbness.   Hematological:  Negative for adenopathy. Does not bruise/bleed easily.   Psychiatric/Behavioral: Negative for confusion.       Past Medical History:   Diagnosis Date    Hypertension     Hypertension 10/25/2019    Peripheral vascular disease 10/25/2019    Thickened endometrium 10/25/2019     Past Surgical History:   Procedure Laterality Date     SECTION      LEG AMPUTATION THROUGH KNEE Left      Family History   Family history unknown: Yes     Social History     Socioeconomic History    Marital status:      Spouse name: Not on file    Number of children: Not on file    Years of education: Not on file    Highest education level: Not on file   Occupational History    Not on file   Social Needs    Financial resource strain: Not on file    Food insecurity:     Worry: Not on file     Inability: Not on file    Transportation needs:     Medical: Not on file     Non-medical: Not on file   Tobacco Use    Smoking status: Former Smoker    Smokeless tobacco: Never Used   Substance and Sexual Activity    Alcohol use: Not Currently    Drug use: Not Currently    Sexual activity: Not on file   Lifestyle    Physical activity:     Days per week: Not on file     Minutes per session: Not on file    Stress: Not on file   Relationships    Social connections:     Talks on phone: Not on file     Gets together: Not on file     Attends Protestant service: Not on file     Active member of club or organization: Not on file     Attends meetings of clubs or organizations: Not on file     Relationship status: Not on file   Other Topics Concern    Not on file   Social History Narrative    Not on file     Current Outpatient Medications   Medication Sig    acetaminophen (TYLENOL) 650 MG TbSR Take 650 mg by mouth every 8 (eight) hours as needed.    aspirin (ECOTRIN) 81 MG EC tablet Take 81 mg by mouth Daily.    ferrous sulfate (FEOSOL) 325 mg (65 mg iron) Tab tablet Take 1 tablet by mouth Daily.    amlodipine-olmesartan  (RUTH) 10-20 mg per tablet Take 1 tablet by mouth Daily.    HYDROcodone-acetaminophen (NORCO) 5-325 mg per tablet Take 1 tablet by mouth every 6 (six) hours as needed for Pain (for breakthrough pain not relieved by Tylenol). (Patient not taking: Reported on 10/16/2019)    miSOPROStol (CYTOTEC) 200 MCG Tab Take 1 tablet (200 mcg total) by mouth once. Take 2 tablets one hour before procedure. for 1 dose (Patient not taking: Reported on 10/16/2019)     No current facility-administered medications for this visit.      Review of patient's allergies indicates:   Allergen Reactions    Pregabalin Swelling    Tramadol Hallucinations       Objective:   Physical Exam:   Constitutional: She is oriented to person, place, and time. She appears well-developed and well-nourished.    HENT:   Head: Normocephalic and atraumatic.    Eyes: Pupils are equal, round, and reactive to light. EOM are normal.    Neck: Normal range of motion. Neck supple. No thyromegaly present.    Cardiovascular: Normal rate, regular rhythm and intact distal pulses.     Pulmonary/Chest: Effort normal and breath sounds normal. No respiratory distress. She has no wheezes.        Abdominal: Soft. Bowel sounds are normal. She exhibits no distension, no ascites and no mass. There is no tenderness.     Genitourinary: Vagina normal. Pelvic exam was performed with patient supine. There is no lesion on the right labia. There is no lesion on the left labia. Uterus is enlarged and tender. Uterus is not fixed. Cervix is normal. Right adnexum displays no mass. Left adnexum displays no mass.           Musculoskeletal: Normal range of motion and moves all extremeties.      Lymphadenopathy:     She has no cervical adenopathy.        Right: No inguinal and no supraclavicular adenopathy present.        Left: No inguinal and no supraclavicular adenopathy present.    Neurological: She is alert and oriented to person, place, and time.    Skin: Skin is warm and dry. No rash  noted.    Psychiatric: She has a normal mood and affect.       Assessment:     1. Thickened endometrium    2. Peripheral vascular disease    3. Hypertension, unspecified type        Plan:   No orders of the defined types were placed in this encounter.      I reviewed with the patient and her daughter the constellation of findings. She is frail, elderly with medical co-morbidities. The cervix is normal and the uterus somewhat enlarged but mobile. In office EMB insufficient. I have recommend D&C hysteroscopy for further work up and evaluation. Differential malignant or benign process. CT does not show any evidence of obvious metastatic disease. Would be easiest for them if we could do the surgery in [place]. Will work on coordinating this.  They were allowed to ask questions and all were answered to their satisfaction.

## 2019-10-25 NOTE — TELEPHONE ENCOUNTER
----- Message from Ximena Thorne MD sent at 10/25/2019 12:09 PM CDT -----  Regarding: D&C hysteroscopy schedule  [facility]--  This patient saw me in [place] but lives in [place]. It will be easier for them if we do the surgery in .     I'd like to get her on the surgery schedule for a D&C hysteroscopy. The next time I am there is 11/13. Please see if this will work for them and if the OR has room for it.

## 2019-10-25 NOTE — PROGRESS NOTES
Subjective:      Patient ID: Pamella Parikh is a 89 y.o. female.    Chief Complaint: No chief complaint on file.      HPI  Referred by Dr. Craig Bond for dilated uterus.      Daughter is historian.   Patient endorsed pelvic pain. Patient was seen in the ED for worsening abdominal pain, was found to have a mass on CT scan that was possible ovarian in origin.  Follow ultrasound showed that the mass was her uterus, ovaries are fine but is filled with fluid.  Patient has lost a lot of weight recently. CT otherwise shows no adenopathy, peritoneal implants, or omental caking, no liver lesions.      Pelvic US 7/25/19  FINDINGS:  Uterus:  Size: 8.5 x 5.6 x 5.7 cm  The pelvic cystic lesion described on CT abdomen pelvis from earlier today 07/25/2019 most likely represents a fluid-filled dilated uterus.  There is a fluid fluid level within the uterus, with mural nodularity.  Findings are nonspecific however neoplastic process is suspected.  Consider endometrial biopsy on a nonemergent basis or as clinically warranted.  Right ovary: 1.9 x 1 x 1.5 cm.  No torsion.  Left ovary: Not visualized.  Free Fluid: Moderate volume free fluid in the abdomen pelvis.     EMB 8/28/19 was non-diagnostic, no endometrial tissue identified.      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    Review of Systems   Constitutional: Negative for appetite change, chills, fatigue and fever.   HENT: Negative for mouth sores.    Respiratory: Negative for cough and shortness of breath.    Cardiovascular: Negative for leg swelling.   Gastrointestinal: Negative for abdominal pain, blood in stool, constipation and diarrhea.   Endocrine: Negative for cold intolerance.   Genitourinary: Negative for dysuria and vaginal bleeding.   Musculoskeletal: Negative for myalgias.   Skin: Negative for rash.   Allergic/Immunologic: Negative.    Neurological: Negative for weakness and numbness.   Hematological:  Negative for adenopathy. Does not bruise/bleed easily.   Psychiatric/Behavioral: Negative for confusion.       Past Medical History:   Diagnosis Date    Hypertension     Hypertension 10/25/2019    Peripheral vascular disease 10/25/2019    Thickened endometrium 10/25/2019     Past Surgical History:   Procedure Laterality Date     SECTION      LEG AMPUTATION THROUGH KNEE Left      Family History   Family history unknown: Yes     Social History     Socioeconomic History    Marital status:      Spouse name: Not on file    Number of children: Not on file    Years of education: Not on file    Highest education level: Not on file   Occupational History    Not on file   Social Needs    Financial resource strain: Not on file    Food insecurity:     Worry: Not on file     Inability: Not on file    Transportation needs:     Medical: Not on file     Non-medical: Not on file   Tobacco Use    Smoking status: Former Smoker    Smokeless tobacco: Never Used   Substance and Sexual Activity    Alcohol use: Not Currently    Drug use: Not Currently    Sexual activity: Not on file   Lifestyle    Physical activity:     Days per week: Not on file     Minutes per session: Not on file    Stress: Not on file   Relationships    Social connections:     Talks on phone: Not on file     Gets together: Not on file     Attends Evangelical service: Not on file     Active member of club or organization: Not on file     Attends meetings of clubs or organizations: Not on file     Relationship status: Not on file   Other Topics Concern    Not on file   Social History Narrative    Not on file     Current Outpatient Medications   Medication Sig    acetaminophen (TYLENOL) 650 MG TbSR Take 650 mg by mouth every 8 (eight) hours as needed.    aspirin (ECOTRIN) 81 MG EC tablet Take 81 mg by mouth Daily.    ferrous sulfate (FEOSOL) 325 mg (65 mg iron) Tab tablet Take 1 tablet by mouth Daily.    amlodipine-olmesartan  (RUTH) 10-20 mg per tablet Take 1 tablet by mouth Daily.    HYDROcodone-acetaminophen (NORCO) 5-325 mg per tablet Take 1 tablet by mouth every 6 (six) hours as needed for Pain (for breakthrough pain not relieved by Tylenol). (Patient not taking: Reported on 10/16/2019)    miSOPROStol (CYTOTEC) 200 MCG Tab Take 1 tablet (200 mcg total) by mouth once. Take 2 tablets one hour before procedure. for 1 dose (Patient not taking: Reported on 10/16/2019)     No current facility-administered medications for this visit.      Review of patient's allergies indicates:   Allergen Reactions    Pregabalin Swelling    Tramadol Hallucinations       Objective:   Physical Exam:   Constitutional: She is oriented to person, place, and time. She appears well-developed and well-nourished.    HENT:   Head: Normocephalic and atraumatic.    Eyes: Pupils are equal, round, and reactive to light. EOM are normal.    Neck: Normal range of motion. Neck supple. No thyromegaly present.    Cardiovascular: Normal rate, regular rhythm and intact distal pulses.     Pulmonary/Chest: Effort normal and breath sounds normal. No respiratory distress. She has no wheezes.        Abdominal: Soft. Bowel sounds are normal. She exhibits no distension, no ascites and no mass. There is no tenderness.     Genitourinary: Vagina normal. Pelvic exam was performed with patient supine. There is no lesion on the right labia. There is no lesion on the left labia. Uterus is enlarged and tender. Uterus is not fixed. Cervix is normal. Right adnexum displays no mass. Left adnexum displays no mass.           Musculoskeletal: Normal range of motion and moves all extremeties.      Lymphadenopathy:     She has no cervical adenopathy.        Right: No inguinal and no supraclavicular adenopathy present.        Left: No inguinal and no supraclavicular adenopathy present.    Neurological: She is alert and oriented to person, place, and time.    Skin: Skin is warm and dry. No rash  noted.    Psychiatric: She has a normal mood and affect.       Assessment:     1. Thickened endometrium    2. Peripheral vascular disease    3. Hypertension, unspecified type        Plan:   No orders of the defined types were placed in this encounter.      I reviewed with the patient and her daughter the constellation of findings. She is frail, elderly with medical co-morbidities. The cervix is normal and the uterus somewhat enlarged but mobile. In office EMB insufficient. I have recommend D&C hysteroscopy for further work up and evaluation. Differential malignant or benign process. CT does not show any evidence of obvious metastatic disease. Would be easiest for them if we could do the surgery in [place]. Will work on coordinating this.  They were allowed to ask questions and all were answered to their satisfaction.

## 2019-11-01 ENCOUNTER — TELEPHONE (OUTPATIENT)
Dept: HEMATOLOGY/ONCOLOGY | Facility: CLINIC | Age: 84
End: 2019-11-01

## 2019-11-01 NOTE — TELEPHONE ENCOUNTER
----- Message from Kaur Miller RN sent at 11/1/2019  4:00 PM CDT -----  Contact: SHERINE WRIGHT [[id number]]  Can you please advise this [facility] patient? Thank you. She is having a procedure there and has questions.    Thanks  Kaur  ----- Message -----  From: Ximena Thorne MD  Sent: 11/1/2019   3:37 PM CDT  To: Kaur Miller RN    No thank you. She's scheduled for [place].   ----- Message -----  From: Kaur Miller RN  Sent: 11/1/2019   2:03 PM CDT  To: Ximena Thorne MD    I see this patient is scheduled for a procedure on 11/13. Does she need to come into the office to sign consents? Would you like me to put the case request in?     Kaur  ----- Message -----  From: Mily Leal  Sent: 11/1/2019   1:55 PM CDT  To: [facility] Staff    Name of Who is Calling : SHERINE WRIGHT [[id number]]    What is the request in detail :     Patient is requesting a call from staff she states she was to have another appointment scheduled and hasn't heard from staff in regards to scheduling  .....Please contact to further discuss and advise.    Can the clinic reply by [facility] :  Phone call only    What Number to Call Back :  [phone]

## 2019-11-04 ENCOUNTER — TELEPHONE (OUTPATIENT)
Dept: HEMATOLOGY/ONCOLOGY | Facility: CLINIC | Age: 84
End: 2019-11-04

## 2019-11-04 NOTE — TELEPHONE ENCOUNTER
----- Message from Kaur Miller RN sent at 11/4/2019 12:39 PM CST -----  Contact: SHERINE WRIGHT       ----- Message -----  From: Daphne Christianson  Sent: 11/4/2019  12:14 PM CST  To: [facility] Staff    Name of Who is Calling: SHERINE WRIGHT       What is the request in detail: Patient is requesting a call back concerning her procedure she states no one has called her or mailed her anything concerning the date and time       Can the clinic reply by [facility]: no      What Number to Call Back if not in [facility]: [phone]

## 2019-11-08 ENCOUNTER — TELEPHONE (OUTPATIENT)
Dept: HEMATOLOGY/ONCOLOGY | Facility: CLINIC | Age: 84
End: 2019-11-08

## 2019-11-08 NOTE — PRE ADMISSION SCREENING
Pt unsure of surgical procedure.  Asked if this is 'like a pap smear'  Informed pt that I will have Dr Thorne call her and explain procedure prior to PAT call.  Dr Thorne's nurse, Priyanka, informed of above information.

## 2019-11-08 NOTE — TELEPHONE ENCOUNTER
----- Message from Aria Brown MA sent at 11/8/2019  2:51 PM CST -----  Contact: Pt      ----- Message -----  From: Heriberto Akins  Sent: 11/8/2019   2:37 PM CST  To: [facility] Staff    Type:  Patient Returning Call    Who Called:Pamella Parikh  Who Left Message for Patient:  Does the patient know what this is regarding?:procedure  Would the patient rather a call back or a response via [facility]? Call Back  Best Call Back Number: [phone]  Additional Information:

## 2019-11-11 ENCOUNTER — LAB VISIT (OUTPATIENT)
Dept: LAB | Facility: HOSPITAL | Age: 84
End: 2019-11-11
Attending: OBSTETRICS & GYNECOLOGY
Payer: MEDICARE

## 2019-11-11 ENCOUNTER — TELEPHONE (OUTPATIENT)
Dept: HEMATOLOGY/ONCOLOGY | Facility: CLINIC | Age: 84
End: 2019-11-11

## 2019-11-11 DIAGNOSIS — R93.89 THICKENED ENDOMETRIUM: Primary | ICD-10-CM

## 2019-11-11 DIAGNOSIS — R93.89 THICKENED ENDOMETRIUM: ICD-10-CM

## 2019-11-11 LAB
ALBUMIN SERPL BCP-MCNC: 4.3 G/DL (ref 3.5–5.2)
ALP SERPL-CCNC: 102 U/L (ref 55–135)
ALT SERPL W/O P-5'-P-CCNC: 23 U/L (ref 10–44)
ANION GAP SERPL CALC-SCNC: 8 MMOL/L (ref 8–16)
AST SERPL-CCNC: 22 U/L (ref 10–40)
BASOPHILS # BLD AUTO: 0.03 K/UL (ref 0–0.2)
BASOPHILS NFR BLD: 0.8 % (ref 0–1.9)
BILIRUB SERPL-MCNC: 0.3 MG/DL (ref 0.1–1)
BUN SERPL-MCNC: 12 MG/DL (ref 10–30)
CALCIUM SERPL-MCNC: 10.1 MG/DL (ref 8.7–10.5)
CHLORIDE SERPL-SCNC: 106 MMOL/L (ref 95–110)
CO2 SERPL-SCNC: 26 MMOL/L (ref 23–29)
CREAT SERPL-MCNC: 0.6 MG/DL (ref 0.5–1.4)
DIFFERENTIAL METHOD: ABNORMAL
EOSINOPHIL # BLD AUTO: 0 K/UL (ref 0–0.5)
EOSINOPHIL NFR BLD: 1 % (ref 0–8)
ERYTHROCYTE [DISTWIDTH] IN BLOOD BY AUTOMATED COUNT: 14.6 % (ref 11.5–14.5)
EST. GFR  (AFRICAN AMERICAN): >60 ML/MIN/1.73 M^2
EST. GFR  (NON AFRICAN AMERICAN): >60 ML/MIN/1.73 M^2
GLUCOSE SERPL-MCNC: 93 MG/DL (ref 70–110)
HCT VFR BLD AUTO: 37.4 % (ref 37–48.5)
HGB BLD-MCNC: 11.7 G/DL (ref 12–16)
IMM GRANULOCYTES # BLD AUTO: 0.01 K/UL (ref 0–0.04)
IMM GRANULOCYTES NFR BLD AUTO: 0.3 % (ref 0–0.5)
LYMPHOCYTES # BLD AUTO: 1.5 K/UL (ref 1–4.8)
LYMPHOCYTES NFR BLD: 38.3 % (ref 18–48)
MCH RBC QN AUTO: 31 PG (ref 27–31)
MCHC RBC AUTO-ENTMCNC: 31.3 G/DL (ref 32–36)
MCV RBC AUTO: 99 FL (ref 82–98)
MONOCYTES # BLD AUTO: 0.3 K/UL (ref 0.3–1)
MONOCYTES NFR BLD: 7.8 % (ref 4–15)
NEUTROPHILS # BLD AUTO: 2 K/UL (ref 1.8–7.7)
NEUTROPHILS NFR BLD: 51.8 % (ref 38–73)
NRBC BLD-RTO: 0 /100 WBC
PLATELET # BLD AUTO: 264 K/UL (ref 150–350)
PMV BLD AUTO: 9.5 FL (ref 9.2–12.9)
POTASSIUM SERPL-SCNC: 4.2 MMOL/L (ref 3.5–5.1)
PROT SERPL-MCNC: 7.9 G/DL (ref 6–8.4)
RBC # BLD AUTO: 3.78 M/UL (ref 4–5.4)
SODIUM SERPL-SCNC: 140 MMOL/L (ref 136–145)
WBC # BLD AUTO: 3.84 K/UL (ref 3.9–12.7)

## 2019-11-11 PROCEDURE — 85025 COMPLETE CBC W/AUTO DIFF WBC: CPT | Mod: PO

## 2019-11-11 PROCEDURE — 36415 COLL VENOUS BLD VENIPUNCTURE: CPT | Mod: PO

## 2019-11-11 PROCEDURE — 80053 COMPREHEN METABOLIC PANEL: CPT | Mod: PO

## 2019-11-11 NOTE — PRE ADMISSION SCREENING
Pre op instructions reviewed with patient per phone:    To confirm, Your surgeon has instructed you:  Surgery is scheduled 11/13/19at 1100.      Please report to [facility] 1st floor main lobby by 0930.  Pre admit office to call afternoon prior to surgery with final arrival time.  If surgery is on Monday, Pre admit office to call Friday afternoon with with final arrival time changes      INSTRUCTIONS IMPORTANT!!!  ¨ No smoking after 12 midnight, the night before surgery.  ¨ No solid food after 12 midnight, but you may have clear liquids up until 3 hours prior to surgery.  This includes: grape, cranberry, and apple juice (not orange, and no coffee.)   ¨ OK to brush teeth, but no gum, candy or mints!    ¨ Take only these medicines with a small swallow of water-morning of surgery.  N/A        ____  Do not wear makeup, including mascara.  ____  No powder, lotions or creams to surgical area.  ____  Please remove all jewelry, including piercings and leave at home.  ____  No money or valuables needed. Please leave at home.  ____  Please bring identification and insurance information to hospital.  ____  If going home the same day, arrange for a ride home. You will not be able to   drive if Anesthesia was used.  ____  Children, under 12 years old, must remain in the waiting room with an adult.  They are not allowed in patient areas.  ____  Wear loose fitting clothing. Allow for dressings, bandages.  ____  Stop Aspirin, Ibuprofen, Motrin and Aleve at least 5-7 days before surgery, unless otherwise instructed by your doctor, or the nurse.   You MAY use Tylenol/acetaminophen until day of surgery.  ____  If you take diabetic medication, do not take am of surgery unless instructed by   Doctor.  ____ Stop taking any Fish Oil supplement or any Vitamins that contain Vitamin E at least 5 days prior to surgery.          Bathing Instructions-- The night before surgery and the morning prior to coming to the  hospital:   -Do not shave the surgical area.   -Shower and wash your hair and body as usual with your regular soap and shampoo.   -Rinse your hair and body completely.   -Use one packet of hibiclens to wash the surgical site (using your hand) gently for 5 minutes.  Do not scrub you skin too hard.   -Do not use hibiclens on your head, face, or genitals.   -Do not wash with regular soap after you use the hibiclens.   -Rinse your body thoroughly.   -Dry with clean, soft towel.  Do not use lotion, cream, deodorant, or powders on   the surgical site.    Use antibacterial soap in place of hibiclens if your surgery is on the head, face or genitals.         Surgical Site Infection    Prevention of surgical site infections:     -Keep incisions clean and dry.   -Do not soak/submerge incisions in water until completely healed.   -Do not apply lotions, powders, creams, or deodorants to site.   -Always make sure hands are cleaned with antibacterial soap/ alcohol-based   prior to touching the surgical site.  (This includes doctors, nurses, staff, and yourself.)    Signs and symptoms:   -Redness and pain around the area where you had surgery   -Drainage of cloudy fluid from your surgical wound   -Fever over 100.4  I have read or had read and explained to me, and understand the above information.

## 2019-11-12 ENCOUNTER — ANESTHESIA EVENT (OUTPATIENT)
Dept: SURGERY | Facility: HOSPITAL | Age: 84
End: 2019-11-12
Payer: MEDICARE

## 2019-11-13 ENCOUNTER — ANESTHESIA (OUTPATIENT)
Dept: SURGERY | Facility: HOSPITAL | Age: 84
End: 2019-11-13
Payer: MEDICARE

## 2019-11-13 ENCOUNTER — HOSPITAL ENCOUNTER (OUTPATIENT)
Facility: HOSPITAL | Age: 84
Discharge: HOME | End: 2019-11-13
Attending: OBSTETRICS & GYNECOLOGY | Admitting: OBSTETRICS & GYNECOLOGY
Payer: MEDICARE

## 2019-11-13 DIAGNOSIS — N18.30 CHRONIC KIDNEY DISEASE, STAGE 3 (MODERATE): ICD-10-CM

## 2019-11-13 DIAGNOSIS — R93.89 THICKENED ENDOMETRIUM: Primary | ICD-10-CM

## 2019-11-13 PROCEDURE — 88342 IMHCHEM/IMCYTCHM 1ST ANTB: CPT | Mod: 26,,, | Performed by: PATHOLOGY

## 2019-11-13 PROCEDURE — 88305 TISSUE EXAM BY PATHOLOGIST: CPT | Performed by: PATHOLOGY

## 2019-11-13 PROCEDURE — 88360 TUMOR IMMUNOHISTOCHEM/MANUAL: CPT | Mod: 59 | Performed by: PATHOLOGY

## 2019-11-13 PROCEDURE — 71000015 HC POSTOP RECOV 1ST HR: Performed by: OBSTETRICS & GYNECOLOGY

## 2019-11-13 PROCEDURE — 88342 CHG IMMUNOCYTOCHEMISTRY: ICD-10-PCS | Mod: 26,,, | Performed by: PATHOLOGY

## 2019-11-13 PROCEDURE — 88305 TISSUE EXAM BY PATHOLOGIST: ICD-10-PCS | Mod: 26,,, | Performed by: PATHOLOGY

## 2019-11-13 PROCEDURE — 36000706: Performed by: OBSTETRICS & GYNECOLOGY

## 2019-11-13 PROCEDURE — 25000003 PHARM REV CODE 250: Performed by: NURSE ANESTHETIST, CERTIFIED REGISTERED

## 2019-11-13 PROCEDURE — 88341 PR IHC OR ICC EACH ADD'L SINGLE ANTIBODY  STAINPR: ICD-10-PCS | Mod: 26,,, | Performed by: PATHOLOGY

## 2019-11-13 PROCEDURE — 58558 PR HYSTEROSCOPY,W/ENDO BX: ICD-10-PCS | Mod: ,,, | Performed by: OBSTETRICS & GYNECOLOGY

## 2019-11-13 PROCEDURE — 58558 HYSTEROSCOPY BIOPSY: CPT | Mod: ,,, | Performed by: OBSTETRICS & GYNECOLOGY

## 2019-11-13 PROCEDURE — 63600175 PHARM REV CODE 636 W HCPCS: Performed by: NURSE ANESTHETIST, CERTIFIED REGISTERED

## 2019-11-13 PROCEDURE — 37000009 HC ANESTHESIA EA ADD 15 MINS: Performed by: OBSTETRICS & GYNECOLOGY

## 2019-11-13 PROCEDURE — 36000707: Performed by: OBSTETRICS & GYNECOLOGY

## 2019-11-13 PROCEDURE — 71000033 HC RECOVERY, INTIAL HOUR: Performed by: OBSTETRICS & GYNECOLOGY

## 2019-11-13 PROCEDURE — 88341 IMHCHEM/IMCYTCHM EA ADD ANTB: CPT | Mod: 26,,, | Performed by: PATHOLOGY

## 2019-11-13 PROCEDURE — 37000008 HC ANESTHESIA 1ST 15 MINUTES: Performed by: OBSTETRICS & GYNECOLOGY

## 2019-11-13 PROCEDURE — 88305 TISSUE EXAM BY PATHOLOGIST: CPT | Mod: 26,,, | Performed by: PATHOLOGY

## 2019-11-13 RX ORDER — PROPOFOL 10 MG/ML
VIAL (ML) INTRAVENOUS
Status: DISCONTINUED | OUTPATIENT
Start: 2019-11-13 | End: 2019-11-13

## 2019-11-13 RX ORDER — SODIUM CHLORIDE 9 MG/ML
INJECTION, SOLUTION INTRAVENOUS CONTINUOUS
Status: CANCELLED | OUTPATIENT
Start: 2019-11-13

## 2019-11-13 RX ORDER — ONDANSETRON 8 MG/1
8 TABLET, ORALLY DISINTEGRATING ORAL EVERY 8 HOURS PRN
Status: DISCONTINUED | OUTPATIENT
Start: 2019-11-13 | End: 2019-11-13 | Stop reason: HOSPADM

## 2019-11-13 RX ORDER — ONDANSETRON 2 MG/ML
4 INJECTION INTRAMUSCULAR; INTRAVENOUS DAILY PRN
Status: DISCONTINUED | OUTPATIENT
Start: 2019-11-13 | End: 2019-11-13 | Stop reason: HOSPADM

## 2019-11-13 RX ORDER — DIPHENHYDRAMINE HCL 25 MG
25 CAPSULE ORAL EVERY 4 HOURS PRN
Status: CANCELLED | OUTPATIENT
Start: 2019-11-13

## 2019-11-13 RX ORDER — SODIUM CHLORIDE 0.9 % (FLUSH) 0.9 %
10 SYRINGE (ML) INJECTION
Status: DISCONTINUED | OUTPATIENT
Start: 2019-11-13 | End: 2019-11-13 | Stop reason: HOSPADM

## 2019-11-13 RX ORDER — FENTANYL CITRATE 50 UG/ML
25 INJECTION, SOLUTION INTRAMUSCULAR; INTRAVENOUS EVERY 5 MIN PRN
Status: DISCONTINUED | OUTPATIENT
Start: 2019-11-13 | End: 2019-11-13 | Stop reason: HOSPADM

## 2019-11-13 RX ORDER — LIDOCAINE HYDROCHLORIDE 10 MG/ML
INJECTION, SOLUTION EPIDURAL; INFILTRATION; INTRACAUDAL; PERINEURAL
Status: DISCONTINUED | OUTPATIENT
Start: 2019-11-13 | End: 2019-11-13

## 2019-11-13 RX ORDER — SODIUM CHLORIDE, SODIUM LACTATE, POTASSIUM CHLORIDE, CALCIUM CHLORIDE 600; 310; 30; 20 MG/100ML; MG/100ML; MG/100ML; MG/100ML
INJECTION, SOLUTION INTRAVENOUS CONTINUOUS PRN
Status: DISCONTINUED | OUTPATIENT
Start: 2019-11-13 | End: 2019-11-13

## 2019-11-13 RX ORDER — HYDROCODONE BITARTRATE AND ACETAMINOPHEN 5; 325 MG/1; MG/1
1 TABLET ORAL EVERY 4 HOURS PRN
Status: CANCELLED | OUTPATIENT
Start: 2019-11-13

## 2019-11-13 RX ORDER — KETOROLAC TROMETHAMINE 30 MG/ML
15 INJECTION, SOLUTION INTRAMUSCULAR; INTRAVENOUS EVERY 8 HOURS PRN
Status: DISCONTINUED | OUTPATIENT
Start: 2019-11-13 | End: 2019-11-13 | Stop reason: HOSPADM

## 2019-11-13 RX ORDER — DIPHENHYDRAMINE HYDROCHLORIDE 50 MG/ML
25 INJECTION INTRAMUSCULAR; INTRAVENOUS EVERY 4 HOURS PRN
Status: CANCELLED | OUTPATIENT
Start: 2019-11-13

## 2019-11-13 RX ORDER — CEFAZOLIN SODIUM 1 G/3ML
INJECTION, POWDER, FOR SOLUTION INTRAMUSCULAR; INTRAVENOUS
Status: DISCONTINUED | OUTPATIENT
Start: 2019-11-13 | End: 2019-11-13

## 2019-11-13 RX ORDER — HYDROCODONE BITARTRATE AND ACETAMINOPHEN 5; 325 MG/1; MG/1
1 TABLET ORAL EVERY 6 HOURS PRN
Qty: 15 TABLET | Refills: 0 | Status: SHIPPED | OUTPATIENT
Start: 2019-11-13 | End: 2020-05-26 | Stop reason: ALTCHOICE

## 2019-11-13 RX ORDER — ETOMIDATE 2 MG/ML
INJECTION INTRAVENOUS
Status: DISCONTINUED | OUTPATIENT
Start: 2019-11-13 | End: 2019-11-13

## 2019-11-13 RX ADMIN — CEFAZOLIN 1 G: 1 INJECTION, POWDER, FOR SOLUTION INTRAMUSCULAR; INTRAVENOUS at 11:11

## 2019-11-13 RX ADMIN — PROPOFOL 10 MG: 10 INJECTION, EMULSION INTRAVENOUS at 11:11

## 2019-11-13 RX ADMIN — ETOMIDATE 2 MG: 2 INJECTION INTRAVENOUS at 11:11

## 2019-11-13 RX ADMIN — ETOMIDATE 2 MG: 2 INJECTION INTRAVENOUS at 12:11

## 2019-11-13 RX ADMIN — SODIUM CHLORIDE, SODIUM LACTATE, POTASSIUM CHLORIDE, AND CALCIUM CHLORIDE: 600; 310; 30; 20 INJECTION, SOLUTION INTRAVENOUS at 11:11

## 2019-11-13 RX ADMIN — LIDOCAINE HYDROCHLORIDE 50 MG: 10 INJECTION, SOLUTION EPIDURAL; INFILTRATION; INTRACAUDAL; PERINEURAL at 11:11

## 2019-11-13 RX ADMIN — PROPOFOL 50 MG: 10 INJECTION, EMULSION INTRAVENOUS at 11:11

## 2019-11-13 NOTE — OP NOTE
Date of surgery: 11/13/19    Surgeon(s) and Role:     * Ximena Thorne MD - Primary     Pre-op Diagnosis:  Thickened endometrium     Post-op Diagnosis:  Thickened endometrium      Procedure(s) (LRB):  DILATION AND CURETTAGE, UTERUS (N/A)  HYSTEROSCOPY, WITH DILATION AND CURETTAGE OF UTERUS (N/A)     Anesthesia: Sedation     Description of the findings of the procedure:   1. Uterus sounded to 7cm  2. Copious old blood and turbid fluid from os when dialted  2. Distended endometrial cavity with irregular and nodular contour  3. Four quadrant tissue sampled with small currett       Estimated Blood Loss: <10cc       Specimens:    Endometrial currettings                   Procedure in Detail:  The patient was taken to the Operating Room where general was obtained, the patient was placed in the lithotomy position, with her legs in the Shorty stirrups.  The patient was then prepped and draped in the normal sterile fashion. A weighted speculum was placed in the posterior aspect of the vagina and another retractor was placed in the anterior aspect of the vagina.  The cervix was visualized and a single-tooth tenaculum was placed on the anterior aspect of the cervix.  The uterus was sounded to7 cm with the uterine sound. The cervix was dilated to accommodate hysteroscope. The hysteroscope was then white balanced and the line cleared of air, it was then inserted into the cervical os and the uterine cavity was directly visualized. Findings noted above. Smallest curette was used to sample all four quadrants. The single tooth tenaculum was then removed. Hemostasis was noted. Sponge, lap and needle counts were correct x 2.  The patient tolerated the procedure well and was taken to recovery in stable condition.

## 2019-11-13 NOTE — ANESTHESIA PREPROCEDURE EVALUATION
11/13/2019  Pamella Levy is a 89 y.o., female.    Anesthesia Evaluation    I have reviewed the Patient Summary Reports.    I have reviewed the Nursing Notes.   I have reviewed the Medications.     Review of Systems  Anesthesia Hx:  No problems with previous Anesthesia Denies Hx of Anesthetic complications  Neg history of prior surgery. Denies Family Hx of Anesthesia complications.   Denies Personal Hx of Anesthesia complications.   Social:  Non-Smoker, No Alcohol Use    Hematology/Oncology:  Hematology Normal   Oncology Normal     EENT/Dental:EENT/Dental Normal   Cardiovascular:   Exercise tolerance: good Hypertension NYHA Classification I ECG has been reviewed.    Pulmonary:  Pulmonary Normal    Renal/:  Renal/ Normal     Hepatic/GI:  Hepatic/GI Normal    Musculoskeletal:  Musculoskeletal Normal    Neurological:  Neurology Normal    Endocrine:  Endocrine Normal    Dermatological:  Skin Normal    Psych:  Psychiatric Normal           Physical Exam  General:  Well nourished    Airway/Jaw/Neck:  Airway Findings: Mouth Opening: Normal Tongue: Normal  General Airway Assessment: Adult  Mallampati: II  TM Distance: 4 - 6 cm  Jaw/Neck Findings:  Neck ROM: Normal ROM      Dental:  Dental Findings: In tact, Upper Dentures   Chest/Lungs:  Chest/Lungs Findings: Clear to auscultation, Normal Respiratory Rate     Heart/Vascular:  Heart Findings: Rate: Normal  Rhythm: Regular Rhythm  Sounds: Normal        Mental Status:  Mental Status Findings:  Cooperative, Alert and Oriented         Anesthesia Plan  Type of Anesthesia, risks & benefits discussed:  Anesthesia Type:  general, MAC  Patient's Preference:   Intra-op Monitoring Plan: standard ASA monitors  Intra-op Monitoring Plan Comments:   Post Op Pain Control Plan: multimodal analgesia and per primary service following discharge from PACU  Post Op Pain Control Plan  Comments:   Induction:   IV  Beta Blocker:  Patient is not currently on a Beta-Blocker (No further documentation required).       Informed Consent: Patient understands risks and agrees with Anesthesia plan.  Questions answered. Anesthesia consent signed with patient.  ASA Score: 2     Day of Surgery Review of History & Physical:  There are no significant changes.          Ready For Surgery From Anesthesia Perspective.     Patient Active Problem List   Diagnosis    Thickened endometrium    Peripheral vascular disease    Hypertension       Chemistry        Component Value Date/Time     11/11/2019 1431    K 4.2 11/11/2019 1431     11/11/2019 1431    CO2 26 11/11/2019 1431    BUN 12 11/11/2019 1431    CREATININE 0.6 11/11/2019 1431    GLU 93 11/11/2019 1431        Component Value Date/Time    CALCIUM 10.1 11/11/2019 1431    ALKPHOS 102 11/11/2019 1431    AST 22 11/11/2019 1431    ALT 23 11/11/2019 1431    BILITOT 0.3 11/11/2019 1431    ESTGFRAFRICA >60.0 11/11/2019 1431    EGFRNONAA >60.0 11/11/2019 1431        Lab Results   Component Value Date    WBC 3.84 (L) 11/11/2019    HGB 11.7 (L) 11/11/2019    HCT 37.4 11/11/2019    MCV 99 (H) 11/11/2019     11/11/2019     Normal sinus rhythm  Normal ECG  No previous ECGs available  Confirmed by MD KIM, FRANCO ([id number]) on 7/25/2019 6:23:27 PM

## 2019-11-13 NOTE — ANESTHESIA RELEASE NOTE
"Anesthesia Release from PACU Note    Patient: Pamella Levy    Procedure(s) Performed: Procedure(s) (LRB):  HYSTEROSCOPY, WITH DILATION AND CURETTAGE OF UTERUS (N/A)    Anesthesia type: MAC    Post pain: Adequate analgesia    Post assessment: no apparent anesthetic complications    Last Vitals:   Visit Vitals  BP (!) 207/86 (BP Location: Right arm, Patient Position: Sitting)   Pulse 77   Temp 36.7 °C (98.1 °F) (Temporal)   Resp 16   Ht 5' 4" (1.626 m)   Wt 37.2 kg (82 lb)   LMP  (LMP Unknown)   SpO2 100%   Breastfeeding? No   BMI 14.08 kg/m²       Post vital signs: stable    Level of consciousness: awake    Nausea/Vomiting: no nausea/no vomiting    Complications: none    Airway Patency: patent    Respiratory: unassisted    Cardiovascular: stable and blood pressure at baseline    Hydration: euvolemic  "

## 2019-11-13 NOTE — ANESTHESIA POSTPROCEDURE EVALUATION
Anesthesia Post Evaluation    Patient: Pamella Levy    Procedure(s) Performed: Procedure(s) (LRB):  HYSTEROSCOPY, WITH DILATION AND CURETTAGE OF UTERUS (N/A)    Final Anesthesia Type: MAC  Patient location during evaluation: PACU  Patient participation: Yes- Able to Participate  Level of consciousness: awake and alert  Post-procedure vital signs: reviewed and stable  Pain management: adequate  Airway patency: patent  PONV status at discharge: No PONV  Anesthetic complications: no      Cardiovascular status: hemodynamically stable  Respiratory status: spontaneous ventilation  Hydration status: euvolemic  Follow-up not needed.          Vitals Value Taken Time   /72 11/13/2019 12:39 PM   Temp 36.8 °C (98.3 °F) 11/13/2019 12:15 PM   Pulse 59 11/13/2019 12:39 PM   Resp 65 11/13/2019 12:39 PM   SpO2 100 % 11/13/2019 12:39 PM   Vitals shown include unvalidated device data.      No case tracking events are documented in the log.      Pain/Niles Score: Niles Score: 10 (11/13/2019 12:15 PM)

## 2019-11-13 NOTE — TRANSFER OF CARE
"Anesthesia Transfer of Care Note    Patient: Pamella Levy    Procedure(s) Performed: Procedure(s) (LRB):  HYSTEROSCOPY, WITH DILATION AND CURETTAGE OF UTERUS (N/A)    Patient location: PACU    Anesthesia Type: MAC    Transport from OR: Transported from OR on room air with adequate spontaneous ventilation    Post pain: adequate analgesia    Post assessment: no apparent anesthetic complications    Post vital signs: stable    Level of consciousness: awake    Nausea/Vomiting: no nausea/vomiting    Complications: none    Transfer of care protocol was followed      Last vitals:   Visit Vitals  BP (!) 207/86 (BP Location: Right arm, Patient Position: Sitting)   Pulse 77   Temp 36.7 °C (98.1 °F) (Temporal)   Resp 16   Ht 5' 4" (1.626 m)   Wt 37.2 kg (82 lb)   LMP  (LMP Unknown)   SpO2 100%   Breastfeeding? No   BMI 14.08 kg/m²     "

## 2019-11-14 NOTE — DISCHARGE SUMMARY
[facility] -   Gynecological Oncology  Discharge Summary    Patient Name: Pamella Levy  MRN: [id number]  Admission Date: 11/13/2019  Hospital Length of Stay: 0 days  Discharge Date and Time: 11/13/2019  2:15 PM  Attending Physician: No att. providers found   Discharging Provider: Ximena Thorne MD  Primary Care Provider: Geronimo Rios MD    Reason for Admission: Thickened endometrium    Hospital Course: Patient presented for scheduled procedure. Patient was passed back to OR for D&C hysteroscopy secondary to thickened endometrium. Please see OP note for further details. Tolerated procedure well and patient was taken to recovery in a stable condition. Prior to discharge patient was able to void, ambulate, tolerate PO and pain was well controlled with PO meds. Patient was given routine post-op instructions for which patient voiced understanding. Patient was subsequently discharged home.    Procedure(s) (LRB):  HYSTEROSCOPY, WITH DILATION AND CURETTAGE OF UTERUS (N/A)         Significant Diagnostic Studies: None    Pending Diagnostic Studies:     Procedure Component Value Units Date/Time    Specimen to Pathology, Surgery Gynecology and Obstetrics [[id number]] Collected:  11/13/19 1206    Order Status:  Sent Lab Status:  In process Updated:  11/13/19 1456        Final Active Diagnoses:    Diagnosis Date Noted POA    Thickened endometrium [R93.89] 10/25/2019 Yes      Problems Resolved During this Admission:        Does this patient meet criteria for extended DVT prophylaxis? No, because minor procedure with sedation    Discharged Condition: good    Disposition: Home or Self Care    Follow Up: 4-6 weeks     Patient Instructions:   No discharge procedures on file.  Medications:  Reconciled Home Medications:      Medication List      ASK your doctor about these medications    acetaminophen 650 MG Tbsr  Commonly known as:  TYLENOL  Take 650 mg by mouth every 8 (eight) hours as needed.     aspirin 81 MG EC  tablet  Commonly known as:  ECOTRIN  Take 81 mg by mouth Daily.     RUTH 10-20 mg per tablet  Generic drug:  amlodipine-olmesartan  Take 1 tablet by mouth Daily.     * SLOW  mg (45 mg iron) Tbsr  Generic drug:  ferrous sulfate  Take 1 tablet by mouth once daily.     * ferrous sulfate 325 mg (65 mg iron) Tab tablet  Commonly known as:  FEOSOL  Take 1 tablet by mouth Daily.     * HYDROcodone-acetaminophen 5-325 mg per tablet  Commonly known as:  NORCO  Take 1 tablet by mouth every 6 (six) hours as needed for Pain (for breakthrough pain not relieved by Tylenol).     * HYDROcodone-acetaminophen 5-325 mg per tablet  Commonly known as:  NORCO  Take 1 tablet by mouth every 6 (six) hours as needed for Pain.     miSOPROStol 200 MCG Tab  Commonly known as:  CYTOTEC  Take 1 tablet (200 mcg total) by mouth once. Take 2 tablets one hour before procedure. for 1 dose         * This list has 4 medication(s) that are the same as other medications prescribed for you. Read the directions carefully, and ask your doctor or other care provider to review them with you.                Ximena Thorne MD  Gynecologic Oncology  [facility] -

## 2019-11-19 ENCOUNTER — TELEPHONE (OUTPATIENT)
Dept: GYNECOLOGIC ONCOLOGY | Facility: CLINIC | Age: 84
End: 2019-11-19

## 2019-11-19 VITALS
BODY MASS INDEX: 14 KG/M2 | OXYGEN SATURATION: 100 % | HEIGHT: 64 IN | TEMPERATURE: 99 F | DIASTOLIC BLOOD PRESSURE: 60 MMHG | RESPIRATION RATE: 16 BRPM | WEIGHT: 82 LBS | HEART RATE: 70 BPM | SYSTOLIC BLOOD PRESSURE: 148 MMHG

## 2019-11-19 NOTE — TELEPHONE ENCOUNTER
Called and spoke with daughter about D&C showing grade 2 endometrial cancer. They will be in on Wed 11/27 at the [facility] for further discussions.

## 2019-11-26 ENCOUNTER — TELEPHONE (OUTPATIENT)
Dept: HEMATOLOGY/ONCOLOGY | Facility: CLINIC | Age: 84
End: 2019-11-26

## 2019-11-26 NOTE — TELEPHONE ENCOUNTER
Called patient to reschedule follow up appointment.  Informed patient next available for Julian wang in January.  Patient declined January appointment and will keep appointment scheduled for tomorrow (11/27/2019).  Patient verbalized understanding of all medical information given.

## 2019-11-26 NOTE — TELEPHONE ENCOUNTER
----- Message from Kaur Miller RN sent at 11/26/2019  1:23 PM CST -----  Contact: Pt  Please reschedule pt for visit in [place]. Mohinder Dietrich  ----- Message -----  From: Heriberto Akins  Sent: 11/26/2019   1:02 PM CST  To: [facility] Staff    Pt called in regards to rescheduling appointment. Pt can be reached at [phone].

## 2019-11-27 ENCOUNTER — OFFICE VISIT (OUTPATIENT)
Dept: GYNECOLOGIC ONCOLOGY | Facility: CLINIC | Age: 84
End: 2019-11-27
Payer: MEDICARE

## 2019-11-27 VITALS
BODY MASS INDEX: 14.08 KG/M2 | HEART RATE: 77 BPM | SYSTOLIC BLOOD PRESSURE: 140 MMHG | DIASTOLIC BLOOD PRESSURE: 61 MMHG | HEIGHT: 64 IN

## 2019-11-27 DIAGNOSIS — C54.1 ENDOMETRIAL CANCER: ICD-10-CM

## 2019-11-27 PROCEDURE — 99212 OFFICE O/P EST SF 10 MIN: CPT | Mod: PBBFAC | Performed by: OBSTETRICS & GYNECOLOGY

## 2019-11-27 PROCEDURE — 99999 PR PBB SHADOW E&M-EST. PATIENT-LVL II: ICD-10-PCS | Mod: PBBFAC,,, | Performed by: OBSTETRICS & GYNECOLOGY

## 2019-11-27 PROCEDURE — 99024 POSTOP FOLLOW-UP VISIT: CPT | Mod: POP,,, | Performed by: OBSTETRICS & GYNECOLOGY

## 2019-11-27 PROCEDURE — 99999 PR PBB SHADOW E&M-EST. PATIENT-LVL II: CPT | Mod: PBBFAC,,, | Performed by: OBSTETRICS & GYNECOLOGY

## 2019-11-27 PROCEDURE — 99024 PR POST-OP FOLLOW-UP VISIT: ICD-10-PCS | Mod: POP,,, | Performed by: OBSTETRICS & GYNECOLOGY

## 2019-11-27 NOTE — LETTER
December 1, 2019        Sherwin Bond MD  [address]  [place] [address]              [facility] - GYN Oncology  [address] [facility] [address]  Phone: [phone]  Fax: [phone]   Patient: Pamella Levy   MR Number: [id number]   YOB: 1927   Date of Visit: 11/27/2019       Dear Dr. Bond:    Thank you for referring Pamella Levy to me for evaluation. Below are the relevant portions of my assessment and plan of care.            If you have questions, please do not hesitate to call me. I look forward to following Pamella along with you.    Sincerely,      Ximena Thorne MD           CC  No Recipients

## 2019-11-27 NOTE — PROGRESS NOTES
Subjective:      Patient ID: Pamella Levy is a 89 y.o. female.    Chief Complaint: Thickened endometrium (follow up)      HPI  S/p D&C hysteroscopy 11/13/19  Endometrium (biopsy):   Endometrioid carcinoma, FIGO 2     Presents today to review pathology results.     Referral history:  Referred by Dr. Craig Bond for dilated uterus.      Daughter is historian.   Patient endorsed pelvic pain. Patient was seen in the ED for worsening abdominal pain, was found to have a mass on CT scan that was possible ovarian in origin.  Follow ultrasound showed that the mass was her uterus, ovaries are fine but is filled with fluid.  Patient has lost a lot of weight recently. CT otherwise shows no adenopathy, peritoneal implants, or omental caking.      Pelvic US 7/25/19  FINDINGS:  Uterus:  Size: 8.5 x 5.6 x 5.7 cm  The pelvic cystic lesion described on CT abdomen pelvis from earlier today 07/25/2019 most likely represents a fluid-filled dilated uterus.  There is a fluid fluid level within the uterus, with mural nodularity.  Findings are nonspecific however neoplastic process is suspected.  Consider endometrial biopsy on a nonemergent basis or as clinically warranted.  Right ovary: 1.9 x 1 x 1.5 cm.  No torsion.  Left ovary: Not visualized.  Free Fluid: Moderate volume free fluid in the abdomen pelvis.     EMB 8/28/19 was non-diagnostic, no endometrial tissue identified.      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  Review of Systems   Constitutional: Negative for appetite change, chills, fatigue and fever.   HENT: Negative for mouth sores.    Respiratory: Negative for cough and shortness of breath.    Cardiovascular: Negative for leg swelling.   Gastrointestinal: Negative for abdominal pain, blood in stool, constipation and diarrhea.   Endocrine: Negative for cold intolerance.   Genitourinary: Positive for vaginal bleeding. Negative for dysuria.   Musculoskeletal:  Negative for myalgias.   Skin: Negative for rash.   Allergic/Immunologic: Negative.    Neurological: Negative for weakness and numbness.   Hematological: Negative for adenopathy. Does not bruise/bleed easily.   Psychiatric/Behavioral: Negative for confusion.       Objective:   Physical Exam:   Constitutional: She is oriented to person, place, and time. She appears well-developed and well-nourished.    HENT:   Head: Normocephalic and atraumatic.    Eyes: Pupils are equal, round, and reactive to light. EOM are normal.    Neck: Normal range of motion. Neck supple. No thyromegaly present.    Cardiovascular: Normal rate, regular rhythm and intact distal pulses.     Pulmonary/Chest: Effort normal and breath sounds normal. No respiratory distress. She has no wheezes.        Abdominal: Soft. Bowel sounds are normal. She exhibits no distension, no ascites and no mass. There is no tenderness.             Musculoskeletal: Normal range of motion and moves all extremeties.      Lymphadenopathy:     She has no cervical adenopathy.        Right: No supraclavicular adenopathy present.        Left: No supraclavicular adenopathy present.    Neurological: She is alert and oriented to person, place, and time.    Skin: Skin is warm and dry. No rash noted.    Psychiatric: She has a normal mood and affect.       Assessment:     1. Endometrial cancer        Plan:   No orders of the defined types were placed in this encounter.    Long discussion with the patient and her daughter regarding diagnosis of endometrial cancer. Elderly and frail. Options include pursuing treatment options such as further imaging with CT CAP and possibly surgery with hysterectomy versus primary radiation. Though medical comorbidities and age and important factors to consider. Alternatively can consider best supportive care and hospice and direct goals of care towards quality of life. They seemed to have a difficult time processing the diagnosis and unable to make  any decisions at today's visit. Will plan for follow up in 2 weeks. All questions answered. Will also involve social work for assistance.

## 2019-12-01 PROBLEM — C54.1 ENDOMETRIAL CANCER: Status: ACTIVE | Noted: 2019-12-01

## 2019-12-02 ENCOUNTER — TELEPHONE (OUTPATIENT)
Dept: HEMATOLOGY/ONCOLOGY | Facility: CLINIC | Age: 84
End: 2019-12-02

## 2019-12-02 ENCOUNTER — TELEPHONE (OUTPATIENT)
Dept: GYNECOLOGIC ONCOLOGY | Facility: CLINIC | Age: 84
End: 2019-12-02

## 2019-12-02 NOTE — TELEPHONE ENCOUNTER
Sw returned patient's call. Sw introduced herself and asked how pt's have been. Pt reports she have been holding up well. She reports she has put everything in God's hands. Sw explained her role. Sw informed pt she will f/u with her at her next appt with Dr. Thorne. Pt asked for SW direct number. Sw provided pt with her direct number. Sw will continue to f/u for any needs pt may have.

## 2019-12-02 NOTE — TELEPHONE ENCOUNTER
Sw attempted to contact patient and her daughter on the two numbers listed in Epic. Sw left VM on pt's mobile number. Sw will f/u with patient at her next appointment.

## 2019-12-02 NOTE — TELEPHONE ENCOUNTER
----- Message from Chandrika Cruz sent at 12/2/2019  2:18 PM CST -----  Contact: pt  Type:  Patient Returning Call    Who Called:pt  Who Left Message for Patient:nurse  Does the patient know what this is regarding?:pt doesn't know  Would the patient rather a call back or a response via [facility]? Call back   Best Call Back Number:[phone]  Additional Information: .    Thank you

## 2019-12-11 ENCOUNTER — TELEPHONE (OUTPATIENT)
Dept: RADIATION ONCOLOGY | Facility: CLINIC | Age: 84
End: 2019-12-11

## 2019-12-11 ENCOUNTER — OFFICE VISIT (OUTPATIENT)
Dept: GYNECOLOGIC ONCOLOGY | Facility: CLINIC | Age: 84
End: 2019-12-11
Payer: MEDICARE

## 2019-12-11 ENCOUNTER — DOCUMENTATION ONLY (OUTPATIENT)
Dept: HEMATOLOGY/ONCOLOGY | Facility: CLINIC | Age: 84
End: 2019-12-11

## 2019-12-11 VITALS
DIASTOLIC BLOOD PRESSURE: 68 MMHG | HEIGHT: 64 IN | BODY MASS INDEX: 14.08 KG/M2 | HEART RATE: 72 BPM | SYSTOLIC BLOOD PRESSURE: 152 MMHG

## 2019-12-11 DIAGNOSIS — C54.1 ENDOMETRIAL CANCER: Primary | ICD-10-CM

## 2019-12-11 PROCEDURE — 99213 PR OFFICE/OUTPT VISIT, EST, LEVL III, 20-29 MIN: ICD-10-PCS | Mod: S$PBB,,, | Performed by: OBSTETRICS & GYNECOLOGY

## 2019-12-11 PROCEDURE — 1159F PR MEDICATION LIST DOCUMENTED IN MEDICAL RECORD: ICD-10-PCS | Mod: ,,, | Performed by: OBSTETRICS & GYNECOLOGY

## 2019-12-11 PROCEDURE — 1159F MED LIST DOCD IN RCRD: CPT | Mod: ,,, | Performed by: OBSTETRICS & GYNECOLOGY

## 2019-12-11 PROCEDURE — 99999 PR PBB SHADOW E&M-EST. PATIENT-LVL III: ICD-10-PCS | Mod: PBBFAC,,, | Performed by: OBSTETRICS & GYNECOLOGY

## 2019-12-11 PROCEDURE — 99213 OFFICE O/P EST LOW 20 MIN: CPT | Mod: S$PBB,,, | Performed by: OBSTETRICS & GYNECOLOGY

## 2019-12-11 PROCEDURE — 99999 PR PBB SHADOW E&M-EST. PATIENT-LVL III: CPT | Mod: PBBFAC,,, | Performed by: OBSTETRICS & GYNECOLOGY

## 2019-12-11 PROCEDURE — 99213 OFFICE O/P EST LOW 20 MIN: CPT | Mod: PBBFAC | Performed by: OBSTETRICS & GYNECOLOGY

## 2019-12-11 NOTE — PROGRESS NOTES
Lamberto met with patient and her daughter, Jian to introduce herself. Jian reports she remembers speaking with LAMBERTO over the phone. Lamberto provided pt and Jina new patient packet. Lamberto went over referrals and Jian was interested in Cancer services for [facility]. Lamberto will fax signed referral to cancer services.  Advance Care Planning   LAMBERTO provided a copy of advanced directives and explained the packet to Jian.     Lamberto also discussed hospice with pt and Jian. Lamberto explained comfort care to pt and Jian. Jian asked appropriate questions. Pt reports she will need to discuss diagnosis with her sons. Lamberto suggested a family meeting with her children and hospice liaisons. Lamberto provided jian with the names of agencies that has an inpatient unit linked to them. Lamberto provided Jian with her card with contact number to call her back once she has discussed hospice to her brothers. Jian seemed to understand, she repeated what Lamberto explained to her. Lamberto will continue to f/u with this family.

## 2019-12-11 NOTE — PROGRESS NOTES
Subjective:      Patient ID: Pamella Levy is a 89 y.o. female.    Chief Complaint: Endometrial Cancer (follow up)      HPI  S/p D&C hysteroscopy 11/13/19  Endometrium (biopsy):   Endometrioid carcinoma, FIGO 2      Presents today for follow up visit. Given her age and comorbidities she is not a candidate for aggressive cancer therapies such as major surgery or chemotherapy. She is elderly and frail. Potentially can consider palliative radiation.      Referral history:  Referred by Dr. Craig Bond for dilated uterus.      Daughter is historian.   Patient endorsed pelvic pain. Patient was seen in the ED for worsening abdominal pain, was found to have a mass on CT scan that was possible ovarian in origin.  Follow ultrasound showed that the mass was her uterus, ovaries are fine but is filled with fluid.  Patient has lost a lot of weight recently. CT otherwise shows no adenopathy, peritoneal implants, or omental caking.      Pelvic US 7/25/19  FINDINGS:  Uterus:  Size: 8.5 x 5.6 x 5.7 cm  The pelvic cystic lesion described on CT abdomen pelvis from earlier today 07/25/2019 most likely represents a fluid-filled dilated uterus.  There is a fluid fluid level within the uterus, with mural nodularity.  Findings are nonspecific however neoplastic process is suspected.  Consider endometrial biopsy on a nonemergent basis or as clinically warranted.  Right ovary: 1.9 x 1 x 1.5 cm.  No torsion.  Left ovary: Not visualized.  Free Fluid: Moderate volume free fluid in the abdomen pelvis.     EMB 8/28/19 was non-diagnostic, no endometrial tissue identified.      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  Review of Systems   Constitutional: Negative for appetite change, chills, fatigue and fever.   HENT: Negative for mouth sores.    Respiratory: Negative for cough and shortness of breath.    Cardiovascular: Negative for leg swelling.   Gastrointestinal: Negative for  abdominal pain, blood in stool, constipation and diarrhea.   Endocrine: Negative for cold intolerance.   Genitourinary: Negative for dysuria and vaginal bleeding.   Musculoskeletal: Negative for myalgias.   Skin: Negative for rash.   Allergic/Immunologic: Negative.    Neurological: Negative for weakness and numbness.   Hematological: Negative for adenopathy. Does not bruise/bleed easily.   Psychiatric/Behavioral: Negative for confusion.       Objective:   Physical Exam:   Constitutional: She is oriented to person, place, and time. She appears well-developed and well-nourished.   Thin and frail    HENT:   Head: Normocephalic and atraumatic.    Eyes: Pupils are equal, round, and reactive to light. EOM are normal.    Neck: Normal range of motion. Neck supple. No thyromegaly present.    Cardiovascular: Normal rate, regular rhythm and intact distal pulses.     Pulmonary/Chest: Effort normal and breath sounds normal. No respiratory distress. She has no wheezes.        Abdominal: Soft. Bowel sounds are normal. She exhibits no distension, no ascites and no mass. There is no tenderness.             Musculoskeletal: Normal range of motion and moves all extremeties.   amputation      Lymphadenopathy:     She has no cervical adenopathy.        Right: No supraclavicular adenopathy present.        Left: No supraclavicular adenopathy present.    Neurological: She is alert and oriented to person, place, and time.    Skin: Skin is warm and dry. No rash noted.    Psychiatric: She has a normal mood and affect.       Assessment:     1. Endometrial cancer        Plan:   No orders of the defined types were placed in this encounter.    We discussed again her diagnosis of endometrial cancer. Given her age, frailty, and comorbidities she is not a candidate for aggressive cancer therapies. We discussed possibility of palliative radiation and potential side effects. We also discussed best supportive care and hospice services. I involved  social work team as well. She would like to discuss her diagnosis with her sons and other family members. She had not shared this information yet. Will continue to help develop management plan. Will also discuss with radiation team.

## 2019-12-11 NOTE — TELEPHONE ENCOUNTER
Called patient to schedule consult appt for radiation oncology but patient refused to schedule. She said they have decided against having radiation & didn't want to schedule the appt.

## 2019-12-18 ENCOUNTER — TELEPHONE (OUTPATIENT)
Dept: HEMATOLOGY/ONCOLOGY | Facility: CLINIC | Age: 84
End: 2019-12-18

## 2019-12-18 NOTE — TELEPHONE ENCOUNTER
Lamberto called pt's daughter to [place] to follow up. Ermelinda reports pt have been doing well. She reports she have not been in pain. Sw asked have she discussed hospice with her brothers. Pt reports she have not and asked SW to explain hospice again. Lamberto explained and asked did she prefer hospice liaison explain. Ermelinda agreed and asked for liaison's number. Lamberto provided Ermelinda with [facility] liaison's number. Lamberto reports she will provide liaison with her number as well. Lamberto will continue to f/u.

## 2019-12-27 ENCOUNTER — TELEPHONE (OUTPATIENT)
Dept: HEMATOLOGY/ONCOLOGY | Facility: CLINIC | Age: 84
End: 2019-12-27

## 2020-02-05 ENCOUNTER — DOCUMENTATION ONLY (OUTPATIENT)
Dept: HEMATOLOGY/ONCOLOGY | Facility: CLINIC | Age: 85
End: 2020-02-05

## 2020-02-05 ENCOUNTER — TELEPHONE (OUTPATIENT)
Dept: HEMATOLOGY/ONCOLOGY | Facility: CLINIC | Age: 85
End: 2020-02-05

## 2020-02-05 DIAGNOSIS — C54.1 ENDOMETRIAL CANCER: Primary | ICD-10-CM

## 2020-02-05 NOTE — PROGRESS NOTES
Lamberto spoke with Saniya from [facility]. She requested orders to their faxed to their office at [phone]. No further needs at this time.

## 2020-02-05 NOTE — TELEPHONE ENCOUNTER
Patient's daughter called on today. Lamberto asked how everything been going. She states patient has good and bad days. Ermelinda reports she is now ready for hospice. Ermelinda chose [facility]. Lamberto called and left  for liaison. Lamberto will contact MD for order.

## 2020-02-12 ENCOUNTER — TELEPHONE (OUTPATIENT)
Dept: GYNECOLOGIC ONCOLOGY | Facility: CLINIC | Age: 85
End: 2020-02-12

## 2020-02-12 ENCOUNTER — DOCUMENTATION ONLY (OUTPATIENT)
Dept: HEMATOLOGY/ONCOLOGY | Facility: CLINIC | Age: 85
End: 2020-02-12

## 2020-02-12 NOTE — PROGRESS NOTES
Sw received call from [facility] on today. Staff reports patient's daughter and grandson came to tour the facility and obtain information. However they have not been back in contact with family. Sw attempted to call. Sw did not receive an answer. Lamberto left VM. Lamberto discussed with MD. MD suggested making an appointment for f/u. Pt has appointment scheduled for March. Sw will f/u.

## 2020-03-19 ENCOUNTER — TELEPHONE (OUTPATIENT)
Dept: GYNECOLOGIC ONCOLOGY | Facility: CLINIC | Age: 85
End: 2020-03-19

## 2020-05-26 ENCOUNTER — HOSPITAL ENCOUNTER (OUTPATIENT)
Dept: RADIOLOGY | Facility: HOSPITAL | Age: 85
Discharge: HOME | End: 2020-05-26
Attending: NURSE PRACTITIONER
Payer: MEDICARE

## 2020-05-26 ENCOUNTER — TELEPHONE (OUTPATIENT)
Dept: INTERNAL MEDICINE | Facility: CLINIC | Age: 85
End: 2020-05-26

## 2020-05-26 ENCOUNTER — OFFICE VISIT (OUTPATIENT)
Dept: INTERNAL MEDICINE | Facility: CLINIC | Age: 85
End: 2020-05-26
Payer: MEDICARE

## 2020-05-26 VITALS
BODY MASS INDEX: 14.08 KG/M2 | OXYGEN SATURATION: 100 % | SYSTOLIC BLOOD PRESSURE: 154 MMHG | HEART RATE: 80 BPM | HEIGHT: 64 IN | RESPIRATION RATE: 12 BRPM | DIASTOLIC BLOOD PRESSURE: 94 MMHG

## 2020-05-26 DIAGNOSIS — M19.90 ARTHRITIS: ICD-10-CM

## 2020-05-26 DIAGNOSIS — M25.512 ACUTE PAIN OF LEFT SHOULDER: ICD-10-CM

## 2020-05-26 DIAGNOSIS — I10 ESSENTIAL HYPERTENSION: Primary | ICD-10-CM

## 2020-05-26 PROBLEM — E21.3 HYPERPARATHYROIDISM: Status: ACTIVE | Noted: 2020-05-26

## 2020-05-26 PROBLEM — G56.00 CARPAL TUNNEL SYNDROME: Status: ACTIVE | Noted: 2020-05-26

## 2020-05-26 PROCEDURE — 99204 PR OFFICE/OUTPT VISIT, NEW, LEVL IV, 45-59 MIN: ICD-10-PCS | Mod: S$PBB,,, | Performed by: NURSE PRACTITIONER

## 2020-05-26 PROCEDURE — 99999 PR PBB SHADOW E&M-EST. PATIENT-LVL IV: ICD-10-PCS | Mod: PBBFAC,,, | Performed by: NURSE PRACTITIONER

## 2020-05-26 PROCEDURE — 73030 X-RAY EXAM OF SHOULDER: CPT | Mod: 26,LT,, | Performed by: RADIOLOGY

## 2020-05-26 PROCEDURE — 73030 X-RAY EXAM OF SHOULDER: CPT | Mod: TC,PO,LT

## 2020-05-26 PROCEDURE — 73030 XR SHOULDER COMPLETE 2 OR MORE VIEWS LEFT: ICD-10-PCS | Mod: 26,LT,, | Performed by: RADIOLOGY

## 2020-05-26 PROCEDURE — 99999 PR PBB SHADOW E&M-EST. PATIENT-LVL IV: CPT | Mod: PBBFAC,,, | Performed by: NURSE PRACTITIONER

## 2020-05-26 PROCEDURE — 99214 OFFICE O/P EST MOD 30 MIN: CPT | Mod: PBBFAC,25,PO | Performed by: NURSE PRACTITIONER

## 2020-05-26 PROCEDURE — 99204 OFFICE O/P NEW MOD 45 MIN: CPT | Mod: S$PBB,,, | Performed by: NURSE PRACTITIONER

## 2020-05-26 RX ORDER — AMLODIPINE AND OLMESARTAN MEDOXOMIL 10; 20 MG/1; MG/1
1 TABLET ORAL DAILY
Qty: 90 TABLET | Refills: 0 | Status: SHIPPED | OUTPATIENT
Start: 2020-05-26 | End: 2021-05-31

## 2020-05-26 RX ORDER — DICLOFENAC SODIUM 10 MG/G
2 GEL TOPICAL 2 TIMES DAILY
Qty: 100 G | Refills: 2 | Status: SHIPPED | OUTPATIENT
Start: 2020-05-26 | End: 2021-05-10

## 2020-05-26 NOTE — PATIENT INSTRUCTIONS
Arthralgia    Arthralgia is the term for pain in or around the joint. It is a symptom, not a disease. This pain may involve one or more joints. In some cases, the pain moves from joint to joint.  There are many causes for joint pain. These include:  · Injury  · Osteoarthritis (wearing out of the joint surface)  · Gout (inflammation of the joint due to crystals in the joint fluid)  · Infection inside the joint    · Bursitis (inflammation of the fluid-filled sacs around the joint)  · Autoimmune disorders such as rheumatoid arthritis or lupus  · Tendonitis (inflammation of chords that attach muscle to bone)  Home care  · Rest the involved joint(s) until your symptoms improve.   · You may be prescribed pain medicine. If none is prescribed, you may use acetaminophen or ibuprofen to control pain and inflammation.  Follow-up care  Follow up with your healthcare provider or as advised.  When to seek medical advice  Contact your healthcare provider right away if any of the following occurs:  · Pain, swelling, or redness of joint increases  · Pain worsens or recurs after a period of improvement  · Pain moves to other joints  · You cannot bear weight on the affected joint   · You cannot move the affected joint  · Joint appears deformed  · New rash appears  · Fever of 100.4ºF (38ºC) or higher, or as directed by your healthcare provider  Date Last Reviewed: 3/1/2017  © 0735-8028 [employer]. [address] [place] [address], [place] [address]. All rights reserved. This information is not intended as a substitute for professional medical care. Always follow your healthcare professional's instructions.        Exercises for Shoulder Flexibility: External Rotation    This stretch can help restore shoulder flexibility and relieve pain over time. When stretching, be sure to breathe deeply. Follow any special instructions from your doctor or physical therapist:  1.  a doorway. Grasp the doorjamb with the hand on the  frozen side. Your arm should be bent.  2. With the other hand, hold the elbow on the frozen side firmly against your body.  3. Standing in the same spot, rotate your body away from the doorjamb. Stop when you feel the stretch in the shoulder. At first, try to hold the stretch for 5 seconds.  4. Work up to doing 3 sets of this stretch, 3 times a day. Work up to holding the stretch for 30 to 60 seconds.  Note: Keep your arms as still as you can. Over time, rotate your body a little more to enhance the stretch. But be careful not to twist your back.  Frozen shoulder  Frozen shoulder is another name for adhesive capsulitis, which causes restricted movement in the shoulder. If you have frozen shoulder, this stretch may cause discomfort, especially when you first get started. A few months may pass before you achieve the results you want. But once your shoulder heals, it rarely becomes frozen again. So stick to your stretching program. If you have any questions, be sure to ask your doctor.   Date Last Reviewed: 8/16/2015 © 2000-2017 [employer]. [address] [place] [address]. All rights reserved. This information is not intended as a substitute for professional medical care. Always follow your healthcare professional's instructions.        Exercises for Shoulder Flexibility: Internal Rotation    This stretch can help restore shoulder flexibility and relieve pain over time. When stretching, be sure to breathe deeply. Follow any special instructions from your healthcare provider or physical therapist.  5. While seated, move the arm on the side you want to stretch toward the middle of your back. The palm of your hand should face out.  6. Cup your other hand under the hand thats behind your back. Gently push your cupped hand upward until you feel the stretch in the shoulder. Try to hold the stretch for 5 seconds.  7. Work up to doing 3 sets of this stretch, 3 times a day. Work up to holding the  stretch for 30 to 60 seconds.  Note: Keep your back straight. Its OK if your hand cant reach the middle of your back. Instead, start the stretch with your hand as close as you can get it to the middle of your back.     Frozen shoulder  Frozen shoulder is another name for adhesive capsulitis. This causes restricted movement in the shoulder. If you have frozen shoulder, this stretch may cause discomfort, especially when you first get started. A few months may pass before you achieve the results you want. But once your shoulder heals, it rarely becomes frozen again. So stick to your stretching program. If you have any questions, be sure to ask your healthcare provider.   Date Last Reviewed: 10/14/2015  © 2425-1353 [employer]. [address] [place] [address], [place] [address]. All rights reserved. This information is not intended as a substitute for professional medical care. Always follow your healthcare professional's instructions.        Exercises for Shoulder Flexibility: Back Scratch    Improving your flexibility can reduce pain. Stretching exercises also can help increase your range of pain-free motion. Breathe normally when you exercise. Try to use smooth, fluid movements. Never force a stretch.  Note: Follow any special instructions you are given. If you feel pain, stop the exercise. If the pain continues after stopping, call your healthcare provider.  · Stand straight, placing the back of your hand on the side you want to stretch flat against your lower back.  · Throw one end of a towel over your shoulder. Grab it behind your back with your other hand.  · Pull down gently on the towel with your front arm. Let your back arm slide up as high as is comfortable. Youll feel a stretch in your shoulder. Hold the stretch for a few seconds.  · Repeat 3 to 5 times. Build up to holding each stretch for 30 to 60 seconds.  For your safety, check with your healthcare provider before starting an exercise program.    Date Last Reviewed: 8/26/2015  © 6069-8172 [employer]. [address] [place] [address]. All rights reserved. This information is not intended as a substitute for professional medical care. Always follow your healthcare professional's instructions.        Exercises for Shoulder Flexibility: Wall Walk    Improving your flexibility can reduce pain. Stretching exercises also can help increase your range of pain-free motion. Breathe normally when you exercise. Use smooth, fluid movements.  Note: Follow any special instructions you are given. If you feel pain, stop the exercise. If the pain continues after stopping, call your healthcare provider:  · Stand with your shoulder about 2 feet from the wall.  · Raise your arm to shoulder level and gently walk your fingers up the wall as high as you can.  · Hold for a few seconds. Then walk your fingers back down.  · Repeat 3 times. Move closer to the wall as you repeat.  · Build up to holding each stretch for 30 seconds.  Caution: Do this stretch only if your healthcare provider recommends it. Dont do it when you are first injured.       Date Last Reviewed: 8/16/2015  © 9730-4412 [employer]. [address] [place] [address]. All rights reserved. This information is not intended as a substitute for professional medical care. Always follow your healthcare professional's instructions.

## 2020-05-26 NOTE — TELEPHONE ENCOUNTER
----- Message from Gwendolyn Swain NP sent at 5/26/2020  3:39 PM CDT -----  No acute abnormalities present. Significant arthritis noted on x ray which may be cause of pain. Continue with treatment plan discussed in office and follow up if symptoms do not improve.

## 2020-05-26 NOTE — TELEPHONE ENCOUNTER
----- Message from Diana Browning sent at 5/26/2020  4:03 PM CDT -----  Contact: pt  Type:  Patient Returning Call    Who Called:pt  Who Left Message for Patient:nurse  Does the patient know what this is regarding?:  Would the patient rather a call back or a response via [facility]? Call back  Best Call Back Number:[phone]  Additional Information:

## 2020-05-26 NOTE — PROGRESS NOTES
Subjective:       Patient ID: Pamella Levy is a 89 y.o. female.    Chief Complaint: Shoulder Pain (left x 3 days)    Shoulder Pain    The pain is present in the left shoulder. This is a new (Left shoulder) problem. The current episode started in the past 7 days (3 days). There has been no history of extremity trauma. The problem occurs intermittently. The problem has been waxing and waning. The quality of the pain is described as aching. The pain is at a severity of 10/10. Associated symptoms include a limited range of motion. Pertinent negatives include no fever, headaches, numbness or tingling. The symptoms are aggravated by activity. She has tried acetaminophen for the symptoms. The treatment provided mild relief. Family history includes arthritis. There is no history of diabetes, Injuries to Extremity or migraines.       Patient Active Problem List   Diagnosis    Thickened endometrium    PVD (peripheral vascular disease)    Hypertension    Endometrial cancer    Hyperparathyroidism    Carpal tunnel syndrome    Arthritis    Acute pain of left shoulder       Family History   Family history unknown: Yes     Past Surgical History:   Procedure Laterality Date    BKA Left     CARDIAC SURGERY      heart cath     SECTION      x 1    HYSTEROSCOPY WITH DILATION AND CURETTAGE OF UTERUS N/A 2019    Procedure: HYSTEROSCOPY, WITH DILATION AND CURETTAGE OF UTERUS;  Surgeon: Ximena Thorne MD;  Location: [facility] OR;  Service: OB/GYN;  Laterality: N/A;         Current Outpatient Medications:     acetaminophen (TYLENOL) 650 MG TbSR, Take 650 mg by mouth every 8 (eight) hours as needed., Disp: , Rfl:     aspirin (ECOTRIN) 81 MG EC tablet, Take 81 mg by mouth Daily., Disp: , Rfl:     amlodipine-olmesartan (RUTH) 10-20 mg per tablet, Take 1 tablet by mouth Daily., Disp: 90 tablet, Rfl: 0    diclofenac sodium (VOLTAREN) 1 % Gel, Apply 2 g topically 2 (two) times daily., Disp: 100 g, Rfl: 2    ferrous  "sulfate (FEOSOL) 325 mg (65 mg iron) Tab tablet, Take 1 tablet by mouth Daily., Disp: , Rfl:     ferrous sulfate (SLOW FE) 142 mg (45 mg iron) TbSR, Take 1 tablet by mouth once daily., Disp: , Rfl:     Review of Systems   Constitutional: Negative for chills, fatigue and fever.   Respiratory: Negative for cough, chest tightness, shortness of breath and wheezing.    Cardiovascular: Negative for chest pain and palpitations.   Gastrointestinal: Negative for nausea and vomiting.   Musculoskeletal: Positive for arthralgias and myalgias. Negative for joint swelling.   Neurological: Negative for dizziness, tingling, syncope, light-headedness, numbness and headaches.       Objective:   BP (!) 154/94 (BP Location: Left arm, Patient Position: Sitting, BP Method: Small (Automatic))   Pulse 80   Resp 12   Ht 5' 4" (1.626 m)   LMP  (LMP Unknown)   SpO2 100%   BMI 14.08 kg/m²      Physical Exam   Constitutional: She appears cachectic. She is cooperative. No distress.   Musculoskeletal:        Left shoulder: She exhibits decreased range of motion, tenderness, pain and decreased strength. She exhibits no bony tenderness, no swelling and normal pulse.   Neurological: She is alert.       Assessment & Plan     Problem List Items Addressed This Visit        Cardiac/Vascular    Hypertension - Primary    Current Assessment & Plan     Elevated today, but has been out of medications with 2 months due to not being able to get in with PCP with pandemic situation. Refilling Ruth today. Having pt follow up in 2 weeks for HTN check.          Relevant Medications    amlodipine-olmesartan (RUTH) 10-20 mg per tablet       Orthopedic    Arthritis    Current Assessment & Plan     Shoulder pain is most likely 2/2 athritis also since pt has hx of arthritis.         Acute pain of left shoulder    Current Assessment & Plan     Doing x ray to r/o other abnormalities. Voltaren gel BID PRN for pain for now with tylenol arthritis, not to exceed " recommended maximum dose.          Relevant Medications    diclofenac sodium (VOLTAREN) 1 % Gel    Other Relevant Orders    X-Ray Shoulder 2 or More Views Left (Completed)        Follow up in about 2 weeks (around 6/9/2020) for hypertension.

## 2020-05-27 PROBLEM — M25.512 ACUTE PAIN OF LEFT SHOULDER: Status: ACTIVE | Noted: 2020-05-27

## 2020-05-27 NOTE — TELEPHONE ENCOUNTER
----- Message from Diana Browning sent at 5/27/2020 10:42 AM CDT -----  Contact: pt  Type:  Patient Returning Call    Who Called:pt  Who Left Message for Patient:nurse  Does the patient know what this is regarding?:  Would the patient rather a call back or a response via [facility]? Call back  Best Call Back Number:[phone]      Additional Information:

## 2020-05-27 NOTE — TELEPHONE ENCOUNTER
Call and spoke with patient care giver her daughter. Result was given and understanding was express

## 2020-05-27 NOTE — ASSESSMENT & PLAN NOTE
Elevated today, but has been out of medications with 2 months due to not being able to get in with PCP with pandemic situation. Refilling Hilary today. Having pt follow up in 2 weeks for HTN check.

## 2020-05-27 NOTE — ASSESSMENT & PLAN NOTE
Doing x ray to r/o other abnormalities. Voltaren gel BID PRN for pain for now with tylenol arthritis, not to exceed recommended maximum dose.

## 2020-06-03 ENCOUNTER — HOSPITAL ENCOUNTER (EMERGENCY)
Facility: HOSPITAL | Age: 85
Discharge: HOME | End: 2020-06-03
Attending: EMERGENCY MEDICINE
Payer: MEDICARE

## 2020-06-03 VITALS
HEART RATE: 65 BPM | BODY MASS INDEX: 12.43 KG/M2 | HEIGHT: 68 IN | DIASTOLIC BLOOD PRESSURE: 71 MMHG | TEMPERATURE: 99 F | WEIGHT: 82 LBS | OXYGEN SATURATION: 100 % | SYSTOLIC BLOOD PRESSURE: 173 MMHG | RESPIRATION RATE: 18 BRPM

## 2020-06-03 DIAGNOSIS — C54.1 ENDOMETRIAL CANCER: ICD-10-CM

## 2020-06-03 DIAGNOSIS — R30.0 DYSURIA: Primary | ICD-10-CM

## 2020-06-03 DIAGNOSIS — D64.9 ANEMIA, UNSPECIFIED TYPE: ICD-10-CM

## 2020-06-03 DIAGNOSIS — N93.9 UTERINE BLEEDING: ICD-10-CM

## 2020-06-03 LAB
ABO + RH BLD: NORMAL
ALBUMIN SERPL BCP-MCNC: 3.7 G/DL (ref 3.5–5.2)
ALP SERPL-CCNC: 99 U/L (ref 55–135)
ALT SERPL W/O P-5'-P-CCNC: 24 U/L (ref 10–44)
ANION GAP SERPL CALC-SCNC: 9 MMOL/L (ref 8–16)
APTT BLDCRRT: 22.8 SEC (ref 21–32)
AST SERPL-CCNC: 24 U/L (ref 10–40)
BACTERIA #/AREA URNS HPF: ABNORMAL /HPF
BASOPHILS # BLD AUTO: 0.05 K/UL (ref 0–0.2)
BASOPHILS NFR BLD: 1.4 % (ref 0–1.9)
BILIRUB SERPL-MCNC: 0.2 MG/DL (ref 0.1–1)
BILIRUB UR QL STRIP: NEGATIVE
BLD GP AB SCN CELLS X3 SERPL QL: NORMAL
BUN SERPL-MCNC: 16 MG/DL (ref 10–30)
CALCIUM SERPL-MCNC: 10 MG/DL (ref 8.7–10.5)
CHLORIDE SERPL-SCNC: 108 MMOL/L (ref 95–110)
CLARITY UR: CLEAR
CO2 SERPL-SCNC: 23 MMOL/L (ref 23–29)
COLOR UR: YELLOW
CREAT SERPL-MCNC: 0.6 MG/DL (ref 0.5–1.4)
DIFFERENTIAL METHOD: ABNORMAL
EOSINOPHIL # BLD AUTO: 0.2 K/UL (ref 0–0.5)
EOSINOPHIL NFR BLD: 4.9 % (ref 0–8)
ERYTHROCYTE [DISTWIDTH] IN BLOOD BY AUTOMATED COUNT: 15.9 % (ref 11.5–14.5)
EST. GFR  (AFRICAN AMERICAN): >60 ML/MIN/1.73 M^2
EST. GFR  (NON AFRICAN AMERICAN): >60 ML/MIN/1.73 M^2
GLUCOSE SERPL-MCNC: 85 MG/DL (ref 70–110)
GLUCOSE UR QL STRIP: NEGATIVE
HCT VFR BLD AUTO: 33.7 % (ref 37–48.5)
HGB BLD-MCNC: 10.2 G/DL (ref 12–16)
HGB UR QL STRIP: ABNORMAL
IMM GRANULOCYTES # BLD AUTO: 0.01 K/UL (ref 0–0.04)
IMM GRANULOCYTES NFR BLD AUTO: 0.3 % (ref 0–0.5)
INR PPP: 1.1 (ref 0.8–1.2)
KETONES UR QL STRIP: NEGATIVE
LEUKOCYTE ESTERASE UR QL STRIP: ABNORMAL
LYMPHOCYTES # BLD AUTO: 1.4 K/UL (ref 1–4.8)
LYMPHOCYTES NFR BLD: 38 % (ref 18–48)
MCH RBC QN AUTO: 29.2 PG (ref 27–31)
MCHC RBC AUTO-ENTMCNC: 30.3 G/DL (ref 32–36)
MCV RBC AUTO: 97 FL (ref 82–98)
MICROSCOPIC COMMENT: ABNORMAL
MONOCYTES # BLD AUTO: 0.3 K/UL (ref 0.3–1)
MONOCYTES NFR BLD: 8.2 % (ref 4–15)
NEUTROPHILS # BLD AUTO: 1.7 K/UL (ref 1.8–7.7)
NEUTROPHILS NFR BLD: 47.2 % (ref 38–73)
NITRITE UR QL STRIP: POSITIVE
NRBC BLD-RTO: 0 /100 WBC
PH UR STRIP: 7 [PH] (ref 5–8)
PLATELET # BLD AUTO: 275 K/UL (ref 150–350)
PMV BLD AUTO: 9.6 FL (ref 9.2–12.9)
POTASSIUM SERPL-SCNC: 4.2 MMOL/L (ref 3.5–5.1)
PROT SERPL-MCNC: 7.2 G/DL (ref 6–8.4)
PROT UR QL STRIP: NEGATIVE
PROTHROMBIN TIME: 11.9 SEC (ref 9–12.5)
RBC # BLD AUTO: 3.49 M/UL (ref 4–5.4)
RBC #/AREA URNS HPF: 15 /HPF (ref 0–4)
SODIUM SERPL-SCNC: 140 MMOL/L (ref 136–145)
SP GR UR STRIP: 1.02 (ref 1–1.03)
URN SPEC COLLECT METH UR: ABNORMAL
UROBILINOGEN UR STRIP-ACNC: NEGATIVE EU/DL
WBC # BLD AUTO: 3.66 K/UL (ref 3.9–12.7)
WBC #/AREA URNS HPF: 2 /HPF (ref 0–5)

## 2020-06-03 PROCEDURE — 85025 COMPLETE CBC W/AUTO DIFF WBC: CPT

## 2020-06-03 PROCEDURE — 85730 THROMBOPLASTIN TIME PARTIAL: CPT

## 2020-06-03 PROCEDURE — 85610 PROTHROMBIN TIME: CPT

## 2020-06-03 PROCEDURE — 25000003 PHARM REV CODE 250: Performed by: EMERGENCY MEDICINE

## 2020-06-03 PROCEDURE — 96360 HYDRATION IV INFUSION INIT: CPT

## 2020-06-03 PROCEDURE — 86850 RBC ANTIBODY SCREEN: CPT

## 2020-06-03 PROCEDURE — 99284 EMERGENCY DEPT VISIT MOD MDM: CPT | Mod: 25

## 2020-06-03 PROCEDURE — 80053 COMPREHEN METABOLIC PANEL: CPT

## 2020-06-03 PROCEDURE — 81000 URINALYSIS NONAUTO W/SCOPE: CPT

## 2020-06-03 RX ORDER — AMOXICILLIN AND CLAVULANATE POTASSIUM 875; 125 MG/1; MG/1
1 TABLET, FILM COATED ORAL
Status: COMPLETED | OUTPATIENT
Start: 2020-06-03 | End: 2020-06-03

## 2020-06-03 RX ORDER — CEFDINIR 300 MG/1
300 CAPSULE ORAL 2 TIMES DAILY
Qty: 14 CAPSULE | Refills: 0 | Status: SHIPPED | OUTPATIENT
Start: 2020-06-03 | End: 2020-06-10

## 2020-06-03 RX ADMIN — AMOXICILLIN AND CLAVULANATE POTASSIUM 1 TABLET: 875; 125 TABLET, FILM COATED ORAL at 08:06

## 2020-06-03 RX ADMIN — SODIUM CHLORIDE 500 ML: 0.9 INJECTION, SOLUTION INTRAVENOUS at 05:06

## 2020-06-03 NOTE — ED NOTES
Received report from DHEERAJ Ge   Updated white board and Introduced self to pt. Pt resting in bed with daughter at bedside     Patient identifiers verified and correct for Pamella Levy.    LOC: The patient is awake, alert and aware of environment with an appropriate affect, the patient is oriented x 3 and speaking appropriately.  APPEARANCE: Patient resting comfortably and in no acute distress, patient is clean and well groomed, patient's clothing is properly fastened.  SKIN: The skin is warm and dry, color consistent with ethnicity, patient has normal skin turgor and moist mucus membranes, skin intact, no breakdown or bruising noted.  MUSCULOSKELETAL: Patient moving all extremities spontaneously. Left BKA noted.   RESPIRATORY: Airway is open and patent, respirations are spontaneous.  CARDIAC: Patient has a normal rate, no peripheral edema noted, capillary refill < 3 seconds.  ABDOMEN: Soft and non tender to palpation.

## 2020-06-03 NOTE — ED PROVIDER NOTES
SCRIBE #1 NOTE: IRon, am scribing for, and in the presence of, Carlos Alberto Casiano Jr., MD. I have scribed the HPI, ROS, and PEx.     SCRIBE #2 NOTE: I, Elizabeth Rae, am scribing for, and in the presence of,  Shila Burkett DO. I have scribed the remaining portions of the note not scribed by Scribe #1.     History      Chief Complaint   Patient presents with    Vaginal Bleeding     x2wk       Review of patient's allergies indicates:   Allergen Reactions    Pregabalin Swelling    Tramadol Hallucinations        HPI   HPI    6/3/2020, 4:46 AM   History obtained from the patient and daughter      History of Present Illness: Pamella Levy is a 89 y.o. female patient with a PMHx of endometrial cancer who presents to the Emergency Department for vaginal bleeding, onset 2 weeks PTA. Symptoms are constant and moderate in severity. No mitigating or exacerbating factors reported. Associated sxs include dysuria. Pt states that she is also passing blood clots. Patient denies any fever, chills, n/v/d, abdominal pain, SOB, CP, weakness, numbness, dizziness, headache, and all other sxs at this time. No prior Tx reported. No further complaints or concerns at this time.  Patient is currently followed by Ob in [place].  She is due to see them on the .  They do not recommend chemo or radiation at this time and due to the patient's age per the daughter.    Arrival mode: Personal vehicle    PCP: Geronimo Rios MD       Past Medical History:  Past Medical History:   Diagnosis Date    Arthritis     Hypertension     Hypertension 10/25/2019    Peripheral vascular disease 10/25/2019    Thickened endometrium 10/25/2019       Past Surgical History:  Past Surgical History:   Procedure Laterality Date    BKA Left     CARDIAC SURGERY      heart cath     SECTION      x 1    HYSTEROSCOPY WITH DILATION AND CURETTAGE OF UTERUS N/A 2019    Procedure: HYSTEROSCOPY, WITH DILATION AND CURETTAGE OF UTERUS;   Surgeon: Ximena Thorne MD;  Location[facility];  Service: OB/GYN;  Laterality: N/A;         Family History:  Family History   Family history unknown: Yes       Social History:  Social History     Tobacco Use    Smoking status: Former Smoker    Smokeless tobacco: Never Used   Substance and Sexual Activity    Alcohol use: Not Currently    Drug use: Not Currently    Sexual activity: Unknown       ROS   Review of Systems   Constitutional: Negative for chills, diaphoresis, fatigue and fever.   HENT: Negative for sore throat.    Respiratory: Negative for shortness of breath.    Cardiovascular: Negative for chest pain.   Gastrointestinal: Negative for abdominal pain, diarrhea, nausea and vomiting.   Genitourinary: Positive for dysuria and vaginal bleeding.   Musculoskeletal: Negative for back pain.   Skin: Negative for rash.   Neurological: Negative for dizziness, seizures, weakness, light-headedness, numbness and headaches.   Hematological: Does not bruise/bleed easily.   All other systems reviewed and are negative.    Physical Exam      Initial Vitals [06/03/20 0438]   BP Pulse Resp Temp SpO2   (!) 229/91 79 18 98.7 °F (37.1 °C) 99 %      MAP       --          Physical Exam  Nursing Notes and Vital Signs Reviewed.  Constitutional: Patient is in no acute distress. Well-developed and well-nourished.  Head: Atraumatic. Normocephalic.  Eyes: PERRL. EOM intact. Conjunctivae are not pale. No scleral icterus.  ENT: Mucous membranes are moist. Oropharynx is clear and symmetric.    Neck: Supple. Full ROM. No lymphadenopathy.  Cardiovascular: Regular rate. Regular rhythm. No murmurs, rubs, or gallops. Distal pulses are 2+ and symmetric.  Pulmonary/Chest: No respiratory distress. Clear to auscultation bilaterally. No wheezing or rales.  Abdominal: Soft and non-distended.  There is no tenderness.  No rebound, guarding, or rigidity.   Musculoskeletal: Moves all extremities. No obvious deformities. No edema.  Skin: Warm and  "dry.  Neurological:  Alert, awake, and appropriate.  Normal speech.  No acute focal neurological deficits are appreciated.  Psychiatric: Normal affect. Good eye contact. Appropriate in content.    ED Course    Procedures  ED Vital Signs:  Vitals:    06/03/20 0438 06/03/20 0453 06/03/20 0502 06/03/20 0537   BP: (!) 229/91 (!) 196/79 (!) 150/66 (!) 175/72   Pulse: 79 73 71 72   Resp: 18 18 16 14   Temp: 98.7 °F (37.1 °C)      TempSrc: Oral      SpO2: 99% 100% 100% 100%   Weight: 37.2 kg (82 lb 0.2 oz)      Height: 5' 8" (1.727 m)       06/03/20 0602 06/03/20 0730 06/03/20 0830   BP: (!) 171/74 (!) 162/70 (!) 173/71   Pulse: 69 64 65   Resp:  17 18   Temp:   98.6 °F (37 °C)   TempSrc:   Oral   SpO2: 100% 100% 100%   Weight:      Height:          Abnormal Lab Results:  Labs Reviewed   CBC W/ AUTO DIFFERENTIAL - Abnormal; Notable for the following components:       Result Value    WBC 3.66 (*)     RBC 3.49 (*)     Hemoglobin 10.2 (*)     Hematocrit 33.7 (*)     Mean Corpuscular Hemoglobin Conc 30.3 (*)     RDW 15.9 (*)     Gran # (ANC) 1.7 (*)     All other components within normal limits   URINALYSIS, REFLEX TO URINE CULTURE - Abnormal; Notable for the following components:    Occult Blood UA 2+ (*)     Nitrite, UA Positive (*)     Leukocytes, UA Trace (*)     All other components within normal limits    Narrative:     Preferred Collection Type->Urine, Clean Catch   URINALYSIS MICROSCOPIC - Abnormal; Notable for the following components:    RBC, UA 15 (*)     Bacteria Many (*)     All other components within normal limits    Narrative:     Preferred Collection Type->Urine, Clean Catch   COMPREHENSIVE METABOLIC PANEL   APTT   PROTIME-INR   TYPE & SCREEN        All Lab Results:  Results for orders placed or performed during the hospital encounter of 06/03/20   CBC auto differential   Result Value Ref Range    WBC 3.66 (L) 3.90 - 12.70 K/uL    RBC 3.49 (L) 4.00 - 5.40 M/uL    Hemoglobin 10.2 (L) 12.0 - 16.0 g/dL    " Hematocrit 33.7 (L) 37.0 - 48.5 %    Mean Corpuscular Volume 97 82 - 98 fL    Mean Corpuscular Hemoglobin 29.2 27.0 - 31.0 pg    Mean Corpuscular Hemoglobin Conc 30.3 (L) 32.0 - 36.0 g/dL    RDW 15.9 (H) 11.5 - 14.5 %    Platelets 275 150 - 350 K/uL    MPV 9.6 9.2 - 12.9 fL    Immature Granulocytes 0.3 0.0 - 0.5 %    Gran # (ANC) 1.7 (L) 1.8 - 7.7 K/uL    Immature Grans (Abs) 0.01 0.00 - 0.04 K/uL    Lymph # 1.4 1.0 - 4.8 K/uL    Mono # 0.3 0.3 - 1.0 K/uL    Eos # 0.2 0.0 - 0.5 K/uL    Baso # 0.05 0.00 - 0.20 K/uL    nRBC 0 0 /100 WBC    Gran% 47.2 38.0 - 73.0 %    Lymph% 38.0 18.0 - 48.0 %    Mono% 8.2 4.0 - 15.0 %    Eosinophil% 4.9 0.0 - 8.0 %    Basophil% 1.4 0.0 - 1.9 %    Differential Method Automated    Comprehensive metabolic panel   Result Value Ref Range    Sodium 140 136 - 145 mmol/L    Potassium 4.2 3.5 - 5.1 mmol/L    Chloride 108 95 - 110 mmol/L    CO2 23 23 - 29 mmol/L    Glucose 85 70 - 110 mg/dL    BUN, Bld 16 10 - 30 mg/dL    Creatinine 0.6 0.5 - 1.4 mg/dL    Calcium 10.0 8.7 - 10.5 mg/dL    Total Protein 7.2 6.0 - 8.4 g/dL    Albumin 3.7 3.5 - 5.2 g/dL    Total Bilirubin 0.2 0.1 - 1.0 mg/dL    Alkaline Phosphatase 99 55 - 135 U/L    AST 24 10 - 40 U/L    ALT 24 10 - 44 U/L    Anion Gap 9 8 - 16 mmol/L    eGFR if African American >60 >60 mL/min/1.73 m^2    eGFR if non African American >60 >60 mL/min/1.73 m^2   APTT   Result Value Ref Range    aPTT 22.8 21.0 - 32.0 sec   Protime-INR   Result Value Ref Range    Prothrombin Time 11.9 9.0 - 12.5 sec    INR 1.1 0.8 - 1.2   Urinalysis, Reflex to Urine Culture Urine, Clean Catch   Result Value Ref Range    Specimen UA Urine, Clean Catch     Color, UA Yellow Yellow, Straw, Humera    Appearance, UA Clear Clear    pH, UA 7.0 5.0 - 8.0    Specific Gravity, UA 1.020 1.005 - 1.030    Protein, UA Negative Negative    Glucose, UA Negative Negative    Ketones, UA Negative Negative    Bilirubin (UA) Negative Negative    Occult Blood UA 2+ (A) Negative    Nitrite, UA  Positive (A) Negative    Urobilinogen, UA Negative <2.0 EU/dL    Leukocytes, UA Trace (A) Negative   Urinalysis Microscopic   Result Value Ref Range    RBC, UA 15 (H) 0 - 4 /hpf    WBC, UA 2 0 - 5 /hpf    Bacteria Many (A) None-Occ /hpf    Microscopic Comment SEE COMMENT    Type & Screen   Result Value Ref Range    Group & Rh B POS     Indirect Kendra NEG      Imaging Results:  Imaging Results    None                 The Emergency Provider reviewed the vital signs and test results, which are outlined above.    ED Discussion     6:00 AM: Dr. Casiano transfers care of pt to Dr. Burkett pending lab results.    8:16 AM: Reassessed pt at this time.  Pt states her condition has improved at this time. Discussed with pt all pertinent ED information and results. Discussed pt dx and plan of tx. Gave pt all f/u and return to the ED instructions. All questions and concerns were addressed at this time. Pt expresses understanding of information and instructions, and is comfortable with plan to discharge. Pt is stable for discharge.    I discussed with patient and/or family/caretaker that evaluation in the ED does not suggest any emergent or life threatening medical conditions requiring immediate intervention beyond what was provided in the ED, and I believe patient is safe for discharge.  Regardless, an unremarkable evaluation in the ED does not preclude the development or presence of a serious of life threatening condition. As such, patient was instructed to return immediately for any worsening or change in current symptoms.           ED Medication(s):  Medications   sodium chloride 0.9% bolus 500 mL (0 mLs Intravenous Stopped 6/3/20 0615)   amoxicillin-clavulanate 875-125mg per tablet 1 tablet (1 tablet Oral Given 6/3/20 0833)       Follow-up Information     Ximena Thorne MD In 2 days.    Specialty:  Gynecologic Oncology  Why:  Return to emergency department for fever, nausea, vomiting, fatigue, increasing bleeding, fast  heart rate, shortness of breath, or other concerns.  Contact information:  [address]  [facility] [address]  [phone]                  Discharge Medication List as of 6/3/2020  8:17 AM      START taking these medications    Details   cefdinir (OMNICEF) 300 MG capsule Take 1 capsule (300 mg total) by mouth 2 (two) times daily. for 7 days, Starting Wed 6/3/2020, Until Wed 6/10/2020, Print             Discharge Medication List as of 6/3/2020  8:17 AM      START taking these medications    Details   cefdinir (OMNICEF) 300 MG capsule Take 1 capsule (300 mg total) by mouth 2 (two) times daily. for 7 days, Starting Wed 6/3/2020, Until Wed 6/10/2020, Print         CONTINUE these medications which have NOT CHANGED    Details   acetaminophen (TYLENOL) 650 MG TbSR Take 650 mg by mouth every 8 (eight) hours as needed., Historical Med      amlodipine-olmesartan (RUTH) 10-20 mg per tablet Take 1 tablet by mouth Daily., Starting Tue 5/26/2020, Normal      aspirin (ECOTRIN) 81 MG EC tablet Take 81 mg by mouth Daily., Historical Med      diclofenac sodium (VOLTAREN) 1 % Gel Apply 2 g topically 2 (two) times daily., Starting Tue 5/26/2020, Normal      ferrous sulfate (FEOSOL) 325 mg (65 mg iron) Tab tablet Take 1 tablet by mouth Daily., Starting Wed 9/2/2015, Historical Med      ferrous sulfate (SLOW FE) 142 mg (45 mg iron) TbSR Take 1 tablet by mouth once daily., Historical Med              Medication List      START taking these medications    cefdinir 300 MG capsule  Commonly known as:  OMNICEF  Take 1 capsule (300 mg total) by mouth 2 (two) times daily. for 7 days        ASK your doctor about these medications    acetaminophen 650 MG Tbsr  Commonly known as:  TYLENOL     amlodipine-olmesartan 10-20 mg per tablet  Commonly known as:  RUTH  Take 1 tablet by mouth Daily.     aspirin 81 MG EC tablet  Commonly known as:  ECOTRIN     diclofenac sodium 1 % Gel  Commonly known as:  VOLTAREN  Apply 2 g topically 2 (two) times  daily.     * SLOW  mg (45 mg iron) Tbsr  Generic drug:  ferrous sulfate     * ferrous sulfate 325 mg (65 mg iron) Tab tablet  Commonly known as:  FEOSOL         * This list has 2 medication(s) that are the same as other medications prescribed for you. Read the directions carefully, and ask your doctor or other care provider to review them with you.               Where to Get Your Medications      You can get these medications from any pharmacy    Bring a paper prescription for each of these medications  · cefdinir 300 MG capsule           Medical Decision Making    Medical Decision Making:   Clinical Tests:   Lab Tests: Ordered and Reviewed           Scribe Attestation:   Scribe #1: I performed the above scribed service and the documentation accurately describes the services I performed. I attest to the accuracy of the note.    Attending:   Physician Attestation Statement for Scribe #1: I, Carlos Alberto Casiano Jr., MD, personally performed the services described in this documentation, as scribed by Ron Jaffe, in my presence, and it is both accurate and complete.       Scribe Attestation:   Scribe #2: I performed the above scribed service and the documentation accurately describes the services I performed. I attest to the accuracy of the note.    Attending Attestation:           Physician Attestation for Scribe:    Physician Attestation Statement for Scribe #2: I, Shila Burkett DO, reviewed documentation, as scribed by Elizabeth Rae in my presence, and it is both accurate and complete. I also acknowledge and confirm the content of the note done by Nirmalibpatrice #1.          Clinical Impression       ICD-10-CM ICD-9-CM   1. Dysuria R30.0 788.1   2. Uterine bleeding N93.9 626.9   3. Anemia, unspecified type D64.9 285.9   4. Endometrial cancer C54.1 182.0       Disposition:   Disposition: Discharged  Condition: Stable         Shila Burkett DO  06/03/20 1531

## 2020-06-03 NOTE — ED NOTES
Pt resting in bed in no acute distress, respirations even and unlabored. Pt denies any needs, educated on call light and verbalizes understanding. Bed low and locked, side rails up x2, call light within reach. Will continue to monitor.

## 2020-09-07 ENCOUNTER — HOSPITAL ENCOUNTER (EMERGENCY)
Facility: HOSPITAL | Age: 85
Discharge: HOME | End: 2020-09-08
Attending: EMERGENCY MEDICINE
Payer: MEDICARE

## 2020-09-07 VITALS
SYSTOLIC BLOOD PRESSURE: 191 MMHG | WEIGHT: 83.75 LBS | BODY MASS INDEX: 12.74 KG/M2 | DIASTOLIC BLOOD PRESSURE: 80 MMHG | HEART RATE: 83 BPM | TEMPERATURE: 99 F | RESPIRATION RATE: 20 BRPM | OXYGEN SATURATION: 99 %

## 2020-09-07 DIAGNOSIS — N30.90 CYSTITIS: ICD-10-CM

## 2020-09-07 DIAGNOSIS — I96 DRY GANGRENE: Primary | ICD-10-CM

## 2020-09-07 DIAGNOSIS — M79.671 RIGHT FOOT PAIN: ICD-10-CM

## 2020-09-07 LAB
BACTERIA #/AREA URNS AUTO: ABNORMAL /HPF
BILIRUB UR QL STRIP: NEGATIVE
CAOX CRY UR QL COMP ASSIST: ABNORMAL
CLARITY UR REFRACT.AUTO: ABNORMAL
COLOR UR AUTO: YELLOW
GLUCOSE UR QL STRIP: NEGATIVE
HGB UR QL STRIP: ABNORMAL
KETONES UR QL STRIP: NEGATIVE
LEUKOCYTE ESTERASE UR QL STRIP: NEGATIVE
MICROSCOPIC COMMENT: ABNORMAL
NITRITE UR QL STRIP: POSITIVE
PH UR STRIP: 6 [PH] (ref 5–8)
PROT UR QL STRIP: NEGATIVE
RBC #/AREA URNS AUTO: >100 /HPF (ref 0–4)
SP GR UR STRIP: 1.02 (ref 1–1.03)
SQUAMOUS #/AREA URNS AUTO: 1 /HPF
URN SPEC COLLECT METH UR: ABNORMAL
UROBILINOGEN UR STRIP-ACNC: NEGATIVE EU/DL
WBC #/AREA URNS AUTO: 12 /HPF (ref 0–5)

## 2020-09-07 PROCEDURE — 87088 URINE BACTERIA CULTURE: CPT

## 2020-09-07 PROCEDURE — 87186 SC STD MICRODIL/AGAR DIL: CPT

## 2020-09-07 PROCEDURE — 81000 URINALYSIS NONAUTO W/SCOPE: CPT | Mod: ER

## 2020-09-07 PROCEDURE — 87086 URINE CULTURE/COLONY COUNT: CPT

## 2020-09-07 PROCEDURE — 87077 CULTURE AEROBIC IDENTIFY: CPT

## 2020-09-07 PROCEDURE — 25000003 PHARM REV CODE 250: Mod: ER | Performed by: EMERGENCY MEDICINE

## 2020-09-07 PROCEDURE — 99283 EMERGENCY DEPT VISIT LOW MDM: CPT | Mod: 25,ER

## 2020-09-07 RX ORDER — IBUPROFEN 200 MG
200 TABLET ORAL
Status: COMPLETED | OUTPATIENT
Start: 2020-09-07 | End: 2020-09-07

## 2020-09-07 RX ORDER — CEPHALEXIN 500 MG/1
500 CAPSULE ORAL 4 TIMES DAILY
Qty: 20 CAPSULE | Refills: 0 | Status: SHIPPED | OUTPATIENT
Start: 2020-09-07 | End: 2020-09-12

## 2020-09-07 RX ADMIN — IBUPROFEN 200 MG: 200 TABLET, FILM COATED ORAL at 11:09

## 2020-09-08 NOTE — ED PROVIDER NOTES
Encounter Date: 2020       History     Chief Complaint   Patient presents with    Foot Injury     c/o right foot pain and swelling     Chief complaint: right foot pain and discoloration.    History of present illness:  89-year-old female states that she has have and recurrence, chronic pain in her right foot.  Complains of pain and discoloration to the right 2nd and 3rd toes.  Patient has a history of peripheral vascular disease with previous left BKA.  She states she has no pain in the foot at rest but has pain when she bears weight and walks.  Severity of pain at worst is moderate.  There is no radiation of the pain.  It involves the distal foot exclusive blood the toes.        Review of patient's allergies indicates:   Allergen Reactions    Pregabalin Swelling    Tramadol Hallucinations     Past Medical History:   Diagnosis Date    Arthritis     Hypertension     Hypertension 10/25/2019    Peripheral vascular disease 10/25/2019    Thickened endometrium 10/25/2019     Past Surgical History:   Procedure Laterality Date    BKA Left     CARDIAC SURGERY      heart cath     SECTION      x 1    HYSTEROSCOPY WITH DILATION AND CURETTAGE OF UTERUS N/A 2019    Procedure: HYSTEROSCOPY, WITH DILATION AND CURETTAGE OF UTERUS;  Surgeon: Ximena Thorne MD;  Location: [facility];  Service: OB/GYN;  Laterality: N/A;     Family History   Family history unknown: Yes     Social History     Tobacco Use    Smoking status: Former Smoker    Smokeless tobacco: Never Used   Substance Use Topics    Alcohol use: Not Currently    Drug use: Not Currently     Review of Systems   Constitutional: Negative for activity change, appetite change and fever.   Genitourinary: Positive for dysuria and hematuria (Dark urine). Negative for decreased urine volume, flank pain and frequency.   Musculoskeletal: Positive for gait problem. Negative for back pain, joint swelling, neck pain and neck stiffness.   Skin: Positive for  color change (To the 2nd and 3rd toes of the right foot with blackening.).   All other systems reviewed and are negative.      Physical Exam     Initial Vitals [09/07/20 2145]   BP Pulse Resp Temp SpO2   (!) 191/80 83 20 98.6 °F (37 °C) 99 %      MAP       --         Physical Exam    Nursing note and vitals reviewed.  Constitutional: She appears well-developed and well-nourished. No distress.   HENT:   Head: Normocephalic and atraumatic.   Right Ear: External ear normal.   Left Ear: External ear normal.   Eyes: Conjunctivae and EOM are normal.   Neck: Normal range of motion. Neck supple.   Cardiovascular: Regular rhythm and intact distal pulses.   Pulmonary/Chest: No respiratory distress.   Abdominal: She exhibits no distension. There is no abdominal tenderness.   Neurological: She is alert. She has normal strength. GCS score is 15. GCS eye subscore is 4. GCS verbal subscore is 5. GCS motor subscore is 6.   Skin: Skin is warm and dry.   Blackish discoloration to the 2nd and 3rd toes of the right foot.  There is an excoriation with thickening of the skin at the plantar aspect of the great toe of the right foot this appears chronic in nature.  No signs of infection externally.   Psychiatric: She has a normal mood and affect. Her behavior is normal.         ED Course   Procedures      Labs Reviewed   URINALYSIS, REFLEX TO URINE CULTURE - Abnormal; Notable for the following components:       Result Value    Appearance, UA Cloudy (*)     Occult Blood UA 3+ (*)     Nitrite, UA Positive (*)     All other components within normal limits    Narrative:     Specimen Source->Urine   URINALYSIS MICROSCOPIC - Abnormal; Notable for the following components:    RBC, UA >100 (*)     WBC, UA 12 (*)     Bacteria Many (*)     All other components within normal limits    Narrative:     Specimen Source->Urine   CULTURE, URINE         Imaging Results          X-Ray Foot Complete Right (Final result)  Result time 09/07/20 22:37:59    Final  result by Ismael Helms MD (09/07/20 22:37:59)                 Impression:      No acute abnormality identified.    Diffuse osteopenia.      Electronically signed by: Ismael Helms  Date:    09/07/2020  Time:    22:37             Narrative:    EXAMINATION:  XR FOOT COMPLETE 3 VIEW RIGHT    CLINICAL HISTORY:  . Pain in right foot    TECHNIQUE:  AP, lateral, and oblique views of the right foot were performed.    COMPARISON:  None    FINDINGS:  There is diffuse osteopenia.  Osseous structures appear grossly intact.  No fracture or dislocation definitely seen.    High density material over the great toe possibly callus.    Relatively mild degenerative changes for age.  Vascular atherosclerosis.                                   Medical Decision Making:   Differential Diagnosis:   Dry gangrene toes right foot chronic  ED Management:  Patient with significant peripheral vascular disease with a previous left BKA presented with complaints of intermittent pain to her right foot.  There has been chronic discoloration of the toes 2.  3.  Of the right foot along with a chronic defect in the great toe.  This symptoms or recurrent and the patient has a vascular surgery appointment in 2 days which she is strongly encouraged to comply with.  Radiologic evaluation revealed no signs of osteomyelitis and no further workup on an emergent basis is warranted at this time.  Urine checked at patient request secondary to dark color found to have microscopic hematuria and pyuria and treated empirically for likely hemorrhagic cystitis.  No need for emergent intervention at this time secondary to chronic changes of PAD                                 Clinical Impression:             ICD-10-CM ICD-9-CM   1. Dry gangrene  I96 785.4   2. Right foot pain  M79.671 729.5          Janes Hernandez MD  09/08/20 0013

## 2020-09-08 NOTE — DISCHARGE INSTRUCTIONS
Keep appointment with the Vascular Surgeon this Wednesday for your test.  Return if pain intolerable or any other concerns.

## 2020-09-09 ENCOUNTER — OFFICE VISIT (OUTPATIENT)
Dept: GYNECOLOGIC ONCOLOGY | Facility: CLINIC | Age: 85
End: 2020-09-09
Payer: MEDICARE

## 2020-09-09 VITALS
BODY MASS INDEX: 14.38 KG/M2 | SYSTOLIC BLOOD PRESSURE: 144 MMHG | DIASTOLIC BLOOD PRESSURE: 69 MMHG | HEIGHT: 64 IN | HEART RATE: 67 BPM

## 2020-09-09 DIAGNOSIS — C54.1 ENDOMETRIAL CANCER: Primary | ICD-10-CM

## 2020-09-09 LAB — BACTERIA UR CULT: ABNORMAL

## 2020-09-09 PROCEDURE — 99999 PR PBB SHADOW E&M-EST. PATIENT-LVL III: ICD-10-PCS | Mod: PBBFAC,,, | Performed by: OBSTETRICS & GYNECOLOGY

## 2020-09-09 PROCEDURE — 99213 OFFICE O/P EST LOW 20 MIN: CPT | Mod: PBBFAC | Performed by: OBSTETRICS & GYNECOLOGY

## 2020-09-09 PROCEDURE — 99214 PR OFFICE/OUTPT VISIT, EST, LEVL IV, 30-39 MIN: ICD-10-PCS | Mod: S$PBB,,, | Performed by: OBSTETRICS & GYNECOLOGY

## 2020-09-09 PROCEDURE — 99214 OFFICE O/P EST MOD 30 MIN: CPT | Mod: S$PBB,,, | Performed by: OBSTETRICS & GYNECOLOGY

## 2020-09-09 PROCEDURE — 99999 PR PBB SHADOW E&M-EST. PATIENT-LVL III: CPT | Mod: PBBFAC,,, | Performed by: OBSTETRICS & GYNECOLOGY

## 2020-09-09 NOTE — PROGRESS NOTES
Subjective:      Patient ID: Pamella Levy is a 89 y.o. female.    Chief Complaint: Endometrial Cancer (follo wup)      HPI  S/p D&C hysteroscopy 11/13/19  Endometrium (biopsy):   Endometrioid carcinoma, FIGO 2      At our last visit 12/2019 we discussed that given her age and comorbidities she is not a candidate for aggressive cancer therapies such as major surgery or chemotherapy. She is elderly and frail. Potentially can consider palliative radiation. She elected against radiation at that time. Had put her in touch with social work for hospice enrollment but did not follow through with this either.    Presents today for follow up discussions. Vaginal bleeding is most bothersome symptom.       Referral history:  Referred by Dr. Craig Bond for dilated uterus.      Daughter is historian.   Patient endorsed pelvic pain. Patient was seen in the ED for worsening abdominal pain, was found to have a mass on CT scan that was possible ovarian in origin.  Follow ultrasound showed that the mass was her uterus, ovaries are fine but is filled with fluid.  Patient has lost a lot of weight recently. CT otherwise shows no adenopathy, peritoneal implants, or omental caking.      Pelvic US 7/25/19  FINDINGS:  Uterus:  Size: 8.5 x 5.6 x 5.7 cm  The pelvic cystic lesion described on CT abdomen pelvis from earlier today 07/25/2019 most likely represents a fluid-filled dilated uterus.  There is a fluid fluid level within the uterus, with mural nodularity.  Findings are nonspecific however neoplastic process is suspected.  Consider endometrial biopsy on a nonemergent basis or as clinically warranted.  Right ovary: 1.9 x 1 x 1.5 cm.  No torsion.  Left ovary: Not visualized.  Free Fluid: Moderate volume free fluid in the abdomen pelvis.     EMB 8/28/19 was non-diagnostic, no endometrial tissue identified.      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  Review of Systems    Constitutional: Negative for appetite change, chills, fatigue and fever.   HENT: Negative for mouth sores.    Respiratory: Negative for cough and shortness of breath.    Cardiovascular: Negative for leg swelling.   Gastrointestinal: Negative for abdominal pain, blood in stool, constipation and diarrhea.   Endocrine: Negative for cold intolerance.   Genitourinary: Positive for vaginal bleeding. Negative for dysuria.   Musculoskeletal: Negative for myalgias.   Skin: Negative for rash.   Allergic/Immunologic: Negative.    Neurological: Negative for weakness and numbness.   Hematological: Negative for adenopathy. Does not bruise/bleed easily.   Psychiatric/Behavioral: Negative for confusion.       Objective:   Physical Exam:   Constitutional: She is oriented to person, place, and time. She appears well-developed and well-nourished.   Elderly frail wheelchair    HENT:   Head: Normocephalic and atraumatic.    Eyes: Pupils are equal, round, and reactive to light. EOM are normal.    Neck: Normal range of motion. Neck supple. No thyromegaly present.    Cardiovascular: Normal rate, regular rhythm and intact distal pulses.     Pulmonary/Chest: Effort normal and breath sounds normal. No respiratory distress. She has no wheezes.        Abdominal: Soft. Bowel sounds are normal. She exhibits no distension and no mass. There is no abdominal tenderness.             Musculoskeletal: Normal range of motion and moves all extremeties.      Lymphadenopathy:     She has no cervical adenopathy.        Right: No supraclavicular adenopathy present.        Left: No supraclavicular adenopathy present.    Neurological: She is alert and oriented to person, place, and time.    Skin: Skin is warm and dry. No rash noted.    Psychiatric: She has a normal mood and affect.       Assessment:     1. Endometrial cancer        Plan:   No orders of the defined types were placed in this encounter.    I again talked with the patient and her family  regarding diagnosis of endometrial cancer and prognosis. Given her age and comorbidities she is not a candidate for aggressive cancer therapies such as major surgery or chemotherapy. She is elderly and frail. To afford her the best quality of life I have recommended again consideration of palliative radiation to help with bleeding which is her most bothersome symptoms and best supportive care with hospice services. Family seems amenable to these today. Social work spoke with patient again and radiation oncology referral reinitiated.     I spent approximately 30 minutes reviewing the available records and evaluating the patient, out of which over 50% of the time was spent face to face with the patient in counseling and coordinating this patient's care.

## 2020-09-14 ENCOUNTER — TELEPHONE (OUTPATIENT)
Dept: HEMATOLOGY/ONCOLOGY | Facility: CLINIC | Age: 85
End: 2020-09-14

## 2020-09-14 NOTE — TELEPHONE ENCOUNTER
Lamberto received VM from patient's daughter to return her call. Lamberto returned call to Ermelinda this AM. Ms. Ermelinda reports she rescheduled patient's appt. Ms. Wadsworth asked about consult with Dr. Bui and what did patient have to wear to appt. Lamberto educated Ermelinda on consult process. Lamberto will continue to f/u.

## 2020-09-15 ENCOUNTER — DOCUMENTATION ONLY (OUTPATIENT)
Dept: HEMATOLOGY/ONCOLOGY | Facility: CLINIC | Age: 85
End: 2020-09-15

## 2020-09-15 ENCOUNTER — OFFICE VISIT (OUTPATIENT)
Dept: RADIATION ONCOLOGY | Facility: CLINIC | Age: 85
End: 2020-09-15
Payer: MEDICARE

## 2020-09-15 VITALS
BODY MASS INDEX: 13.94 KG/M2 | HEART RATE: 86 BPM | DIASTOLIC BLOOD PRESSURE: 74 MMHG | OXYGEN SATURATION: 99 % | TEMPERATURE: 97 F | RESPIRATION RATE: 18 BRPM | HEIGHT: 65 IN | SYSTOLIC BLOOD PRESSURE: 177 MMHG

## 2020-09-15 DIAGNOSIS — C54.1 ENDOMETRIAL CANCER: Primary | ICD-10-CM

## 2020-09-15 PROCEDURE — 99205 PR OFFICE/OUTPT VISIT, NEW, LEVL V, 60-74 MIN: ICD-10-PCS | Mod: S$PBB,,, | Performed by: RADIOLOGY

## 2020-09-15 PROCEDURE — 99205 OFFICE O/P NEW HI 60 MIN: CPT | Mod: S$PBB,,, | Performed by: RADIOLOGY

## 2020-09-15 PROCEDURE — 99999 PR PBB SHADOW E&M-EST. PATIENT-LVL III: CPT | Mod: PBBFAC,,, | Performed by: RADIOLOGY

## 2020-09-15 PROCEDURE — 99999 PR PBB SHADOW E&M-EST. PATIENT-LVL III: ICD-10-PCS | Mod: PBBFAC,,, | Performed by: RADIOLOGY

## 2020-09-15 PROCEDURE — 99213 OFFICE O/P EST LOW 20 MIN: CPT | Mod: PBBFAC | Performed by: RADIOLOGY

## 2020-09-15 NOTE — PROGRESS NOTES
Nurse called pt's numbers listed in epic to set up a visit with Dr Crews, no answer at time of call, voice message left to call office.

## 2020-09-15 NOTE — PROGRESS NOTES
[facility]  RADIATION ONCOLOGY CONSULTATION    Name: Pamella Levy  : 3/1/1927      Patient Referred To Radiation Oncology By:  Dr. Ximena Thorne MD  [address] [place] [address]    DIAGNOSIS: endometrial carcinoma, endometrioid type, FIGO grade 2, non-operative    HISTORY OF PRESENT ILLNESS:  Pamella Levy is a 89 y.o. female who presents for consultation for the above diagnosis.   She was seen in ED for abdominal pain in 2019 and noted weight loss at the time.  Pelvic US 29 showed fluid-filled dilated uterus. EMB 19 was non-diagnostic.  She met with Dr. Thorne recently and noted bothersome vaginal bleeding.  EMB 19 showed endometrioid carcinoma, grade 2.  She is not a candidate for chemotherapy or aggressive surgery. She had elected against palliative radiation in late .  Today, she notes bleeding constantly when she sits down and has to change pads often. Pelvic pain present intermittently and she is constipated.    REVIEW OF SYSTEMS: (Positive findings bold, otherwise negative)   Constitutional: fever, fatigue, weight change  Eyes: blurred vision in the past 3 months, double vision   ENT: ear pain, new mouth lesions, jaw pain, difficulty swallowing, sore throat  Cardiovascular: chest pain on exertion, reflux, leg swelling  Respiratory: shortness of breath, dyspnea, cough, hemoptysis.   GI: abdominal pain, diarrhea, constipation, blood in stool, painful bowel movements  : painful or burning urination, blood in urine, vaginal bleeding  Musculoskeletal: new bone or joint pains  Neurologic: headache, seizure, focal numbness or tingling, balance changes, speech changes  Lymph: new or enlarged lymph nodes  Psychiatric: depression, anxiety    PRIOR RADIATION HISTORY: none    PAST MEDICAL HISTORY:  Past Medical History:   Diagnosis Date    Arthritis     Hypertension     Hypertension 10/25/2019    Peripheral vascular disease 10/25/2019    Thickened  endometrium 10/25/2019       PAST SURGICAL HISTORY:  Past Surgical History:   Procedure Laterality Date    BKA Left     CARDIAC SURGERY      heart cath     SECTION      x 1    HYSTEROSCOPY WITH DILATION AND CURETTAGE OF UTERUS N/A 2019    Procedure: HYSTEROSCOPY, WITH DILATION AND CURETTAGE OF UTERUS;  Surgeon: Ximena Thorne MD;  Location: [facility];  Service: OB/GYN;  Laterality: N/A;       ALLERGIES:   Review of patient's allergies indicates:   Allergen Reactions    Pregabalin Swelling    Tramadol Hallucinations       MEDICATIONS:    Current Outpatient Medications:     acetaminophen (TYLENOL) 650 MG TbSR, Take 650 mg by mouth every 8 (eight) hours as needed., Disp: , Rfl:     amlodipine-olmesartan (RUTH) 10-20 mg per tablet, Take 1 tablet by mouth Daily., Disp: 90 tablet, Rfl: 0    aspirin (ECOTRIN) 81 MG EC tablet, Take 81 mg by mouth Daily., Disp: , Rfl:     diclofenac sodium (VOLTAREN) 1 % Gel, Apply 2 g topically 2 (two) times daily., Disp: 100 g, Rfl: 2    ferrous sulfate (FEOSOL) 325 mg (65 mg iron) Tab tablet, Take 1 tablet by mouth Daily., Disp: , Rfl:     ferrous sulfate (SLOW FE) 142 mg (45 mg iron) TbSR, Take 1 tablet by mouth once daily., Disp: , Rfl:     SOCIAL HISTORY:  Social History     Socioeconomic History    Marital status:      Spouse name: Not on file    Number of children: Not on file    Years of education: Not on file    Highest education level: Not on file   Occupational History    Not on file   Social Needs    Financial resource strain: Not on file    Food insecurity     Worry: Not on file     Inability: Not on file    Transportation needs     Medical: Not on file     Non-medical: Not on file   Tobacco Use    Smoking status: Former Smoker    Smokeless tobacco: Never Used   Substance and Sexual Activity    Alcohol use: Not Currently    Drug use: Not Currently    Sexual activity: Not on file   Lifestyle    Physical activity     Days per week:  "Not on file     Minutes per session: Not on file    Stress: Not on file   Relationships    Social connections     Talks on phone: Not on file     Gets together: Not on file     Attends Confucianist service: Not on file     Active member of club or organization: Not on file     Attends meetings of clubs or organizations: Not on file     Relationship status: Not on file   Other Topics Concern    Not on file   Social History Narrative    Not on file     Lives in [place]    FAMILY HISTORY:  Family History   Family history unknown: Yes       PHYSICAL EXAMINATION:  Constitutional: frail-appearing, no acute distress, ECOG 3 - Confined to bed or chair 50% of waking hours  Vitals:    BP (!) 177/74   Pulse 86   Temp 97.2 °F (36.2 °C)   Resp 18   Ht 5' 5" (1.651 m)   LMP  (LMP Unknown)   SpO2 99%   BMI 13.94 kg/m²   Eyes: sclera anicteric, EOMI, pupils equal, round and reactive to light  ENT: oral cavity without lesions, moist mucous membranes  Neck: trachea midline, neck supple  Lymphatic: no cervical, supraclavicular or axillary adenopathy  Breast: no masses, skin changes or retractions, non-tender  Cardiovascular: regular rate, no murmurs, no edema of the upper or lower extremities, radial pulse 2+  Respiratory: unlabored effort, clear to auscultation, no wheezes  Abdomen: soft, non-tender, no rigidity, no masses, no hepatomegaly  Gyn: deferred  Neuro: Cranial nerves III-XII intact, speech not slurred, gait non-ataxic, no dysdiadochokinesia, strength 5/5 upper and lower extremities  MSK: non-tender to percussion cervical, thoracic and lumbosacral spine, s/p LLE amputation    IMAGING AND LABORATORY FINDINGS: As per HPI; images reviewed personally.    ASSESSMENT: 89 y.o. female with endometrioid carcinoma, grade 2, non-surgical candidate    PLAN: Ms. Levy has an endometrial cancer with symptomatic vaginal bleeding but does not appear to be a candidate for any definitive treatment procedures. Non-operative " management with curative intent for radiation would consist of 45Gy/25fx external beam RT followed by dual-tandem brachytherapy. She does not want to proceed with this and I agree it would not be in her best interest due to her performance status.    The other options would be palliative radiation to reduce bleeding and improve pain vs enrollment in hospice. I discussed palliative care referral and she will consider; can likely make referral during her radiation.    I may also ask my colleagues in medical oncology to review her case and see if she could be a candidate for hormonal therapy, if that could be another low toxicity intervention to prevent progression.    We discussed the techniques, toxicities and indications of radiation and I answered the patient's questions to their apparent satisfaction. She would like to proceed with radiation to stop bleeding. Radiation has a good chance to reduce or stop vaginal bleeding and may improve her abdominal/pelvic pain.  I will give 20-25Gy/5fx. Informed consent was obtained and CT simulation will be scheduled shortly.    I spent approximately 60 minutes reviewing the available records and evaluating the patient, out of which over 50% of the time was spent face to face with the patient in counseling and coordinating this patient's care.    Eliecer Bui III, M.D.  Radiation Oncology  [facility]  [id number] [place] [address] [place] [address]  Ph: [phone]  [email or web address]

## 2020-09-15 NOTE — LETTER
September 15, 2020      Ximena Thorne MD  [address]  [facility] [address]            Cancer Center - Radiation Oncology  [address] [place] [address]  Phone: [phone]  Fax: [phone]          Patient: Pamella Levy   MR Number: [id number]   YOB: 1927   Date of Visit: 9/15/2020       Dear Dr. Ximena Thorne:    Thank you for referring Pamella Levy to me for evaluation. Attached you will find relevant portions of my assessment and plan of care.    If you have questions, please do not hesitate to call me. I look forward to following Pamella Levy along with you.    Sincerely,    Eliecer Bui III, MD    Enclosure  CC:  No Recipients    If you would like to receive this communication electronically, please contact [email or web address] or [phone] to request more information on [employer] Link access.    For providers and/or their staff who would like to refer a patient to [facility], please contact us through our one-stop-shop provider referral line, [facility], at [phone].    If you feel you have received this communication in error or would no longer like to receive these types of communications, please e-mail [email or web address]

## 2020-09-16 ENCOUNTER — HOSPITAL ENCOUNTER (OUTPATIENT)
Dept: RADIOLOGY | Facility: HOSPITAL | Age: 85
Discharge: HOME | End: 2020-09-16
Attending: RADIOLOGY
Payer: MEDICARE

## 2020-09-16 ENCOUNTER — HOSPITAL ENCOUNTER (OUTPATIENT)
Dept: RADIATION THERAPY | Facility: HOSPITAL | Age: 85
Discharge: HOME | End: 2020-09-16
Attending: RADIOLOGY
Payer: MEDICARE

## 2020-09-16 PROCEDURE — 77290 THER RAD SIMULAJ FIELD CPLX: CPT | Mod: 26,,, | Performed by: RADIOLOGY

## 2020-09-16 PROCEDURE — 77334 RADIATION TREATMENT AID(S): CPT | Mod: 26,,, | Performed by: RADIOLOGY

## 2020-09-16 PROCEDURE — 77263 PR  RADIATION THERAPY PLAN COMPLEX: ICD-10-PCS | Mod: ,,, | Performed by: RADIOLOGY

## 2020-09-16 PROCEDURE — 77290 THER RAD SIMULAJ FIELD CPLX: CPT | Mod: TC | Performed by: RADIOLOGY

## 2020-09-16 PROCEDURE — 77263 THER RADIOLOGY TX PLNG CPLX: CPT | Mod: ,,, | Performed by: RADIOLOGY

## 2020-09-16 PROCEDURE — 77334 RADIATION TREATMENT AID(S): CPT | Mod: TC | Performed by: RADIOLOGY

## 2020-09-16 PROCEDURE — 77334 PR  RADN TREATMENT AID(S) COMPLX: ICD-10-PCS | Mod: 26,,, | Performed by: RADIOLOGY

## 2020-09-16 PROCEDURE — 77290 PR  SET RADN THERAPY FIELD COMPLEX: ICD-10-PCS | Mod: 26,,, | Performed by: RADIOLOGY

## 2020-09-16 PROCEDURE — 77014 HC CT GUIDANCE RADIATION THERAPY FLDS PLACEMENT: CPT | Mod: TC | Performed by: RADIOLOGY

## 2020-09-18 PROCEDURE — 77334 PR  RADN TREATMENT AID(S) COMPLX: ICD-10-PCS | Mod: 26,,, | Performed by: RADIOLOGY

## 2020-09-18 PROCEDURE — 77300 RADIATION THERAPY DOSE PLAN: CPT | Mod: 26,,, | Performed by: RADIOLOGY

## 2020-09-18 PROCEDURE — 77334 RADIATION TREATMENT AID(S): CPT | Mod: TC | Performed by: RADIOLOGY

## 2020-09-18 PROCEDURE — 77300 RADIATION THERAPY DOSE PLAN: CPT | Mod: TC | Performed by: RADIOLOGY

## 2020-09-18 PROCEDURE — 77300 PR RADIATION THERAPY,DOSIMETRY PLAN: ICD-10-PCS | Mod: 26,,, | Performed by: RADIOLOGY

## 2020-09-18 PROCEDURE — 77295 3-D RADIOTHERAPY PLAN: CPT | Mod: TC | Performed by: RADIOLOGY

## 2020-09-18 PROCEDURE — 77334 RADIATION TREATMENT AID(S): CPT | Mod: 26,,, | Performed by: RADIOLOGY

## 2020-09-18 PROCEDURE — 77295 PR 3D RADIOTHERAPY PLAN: ICD-10-PCS | Mod: 26,,, | Performed by: RADIOLOGY

## 2020-09-18 PROCEDURE — 77295 3-D RADIOTHERAPY PLAN: CPT | Mod: 26,,, | Performed by: RADIOLOGY

## 2020-09-22 ENCOUNTER — TELEPHONE (OUTPATIENT)
Dept: RADIATION ONCOLOGY | Facility: CLINIC | Age: 85
End: 2020-09-22

## 2020-09-22 NOTE — TELEPHONE ENCOUNTER
Called Mrs. Parikh's daughter, Ms. Wadsworth, regarding scheduling out her xrt.  Ms. Wadsworth stated that she was upset that her family members know about Mrs. Parikh's medical condition and requested that her home number be removed from her chart.  Home number was removed per daughter and replaced by preferred contact number.

## 2020-09-24 LAB
FINAL PATHOLOGIC DIAGNOSIS: NORMAL
GROSS: NORMAL
Lab: NORMAL
SUPPLEMENTAL DIAGNOSIS: NORMAL

## 2020-09-25 ENCOUNTER — LAB VISIT (OUTPATIENT)
Dept: LAB | Facility: HOSPITAL | Age: 85
End: 2020-09-25
Attending: INTERNAL MEDICINE
Payer: MEDICARE

## 2020-09-25 ENCOUNTER — OFFICE VISIT (OUTPATIENT)
Dept: HEMATOLOGY/ONCOLOGY | Facility: CLINIC | Age: 85
End: 2020-09-25
Payer: MEDICARE

## 2020-09-25 VITALS
DIASTOLIC BLOOD PRESSURE: 66 MMHG | SYSTOLIC BLOOD PRESSURE: 148 MMHG | WEIGHT: 110 LBS | HEART RATE: 88 BPM | TEMPERATURE: 98 F | HEIGHT: 65 IN | BODY MASS INDEX: 18.33 KG/M2 | OXYGEN SATURATION: 97 %

## 2020-09-25 DIAGNOSIS — N93.9 VAGINAL BLEEDING: ICD-10-CM

## 2020-09-25 DIAGNOSIS — C54.1 ENDOMETRIAL CANCER: ICD-10-CM

## 2020-09-25 DIAGNOSIS — D50.0 IRON DEFICIENCY ANEMIA DUE TO CHRONIC BLOOD LOSS: ICD-10-CM

## 2020-09-25 DIAGNOSIS — C54.1 ENDOMETRIAL CANCER: Primary | ICD-10-CM

## 2020-09-25 LAB
BASOPHILS # BLD AUTO: 0.04 K/UL (ref 0–0.2)
BASOPHILS NFR BLD: 1.1 % (ref 0–1.9)
DIFFERENTIAL METHOD: ABNORMAL
EOSINOPHIL # BLD AUTO: 0 K/UL (ref 0–0.5)
EOSINOPHIL NFR BLD: 1.1 % (ref 0–8)
ERYTHROCYTE [DISTWIDTH] IN BLOOD BY AUTOMATED COUNT: 13.5 % (ref 11.5–14.5)
HCT VFR BLD AUTO: 31.1 % (ref 37–48.5)
HGB BLD-MCNC: 9.5 G/DL (ref 12–16)
IMM GRANULOCYTES # BLD AUTO: 0.01 K/UL (ref 0–0.04)
IMM GRANULOCYTES NFR BLD AUTO: 0.3 % (ref 0–0.5)
LYMPHOCYTES # BLD AUTO: 1.2 K/UL (ref 1–4.8)
LYMPHOCYTES NFR BLD: 34.2 % (ref 18–48)
MCH RBC QN AUTO: 29.9 PG (ref 27–31)
MCHC RBC AUTO-ENTMCNC: 30.5 G/DL (ref 32–36)
MCV RBC AUTO: 98 FL (ref 82–98)
MONOCYTES # BLD AUTO: 0.4 K/UL (ref 0.3–1)
MONOCYTES NFR BLD: 11 % (ref 4–15)
NEUTROPHILS # BLD AUTO: 1.9 K/UL (ref 1.8–7.7)
NEUTROPHILS NFR BLD: 52.3 % (ref 38–73)
NRBC BLD-RTO: 0 /100 WBC
PLATELET # BLD AUTO: 374 K/UL (ref 150–350)
PMV BLD AUTO: 9.7 FL (ref 9.2–12.9)
RBC # BLD AUTO: 3.18 M/UL (ref 4–5.4)
WBC # BLD AUTO: 3.54 K/UL (ref 3.9–12.7)

## 2020-09-25 PROCEDURE — 83540 ASSAY OF IRON: CPT

## 2020-09-25 PROCEDURE — 36415 COLL VENOUS BLD VENIPUNCTURE: CPT

## 2020-09-25 PROCEDURE — 99205 OFFICE O/P NEW HI 60 MIN: CPT | Mod: S$PBB,,, | Performed by: INTERNAL MEDICINE

## 2020-09-25 PROCEDURE — 82728 ASSAY OF FERRITIN: CPT

## 2020-09-25 PROCEDURE — 99215 OFFICE O/P EST HI 40 MIN: CPT | Mod: PBBFAC | Performed by: INTERNAL MEDICINE

## 2020-09-25 PROCEDURE — 99205 PR OFFICE/OUTPT VISIT, NEW, LEVL V, 60-74 MIN: ICD-10-PCS | Mod: S$PBB,,, | Performed by: INTERNAL MEDICINE

## 2020-09-25 PROCEDURE — 99999 PR PBB SHADOW E&M-EST. PATIENT-LVL V: ICD-10-PCS | Mod: PBBFAC,,, | Performed by: INTERNAL MEDICINE

## 2020-09-25 PROCEDURE — 99999 PR PBB SHADOW E&M-EST. PATIENT-LVL V: CPT | Mod: PBBFAC,,, | Performed by: INTERNAL MEDICINE

## 2020-09-25 PROCEDURE — 85025 COMPLETE CBC W/AUTO DIFF WBC: CPT

## 2020-09-25 NOTE — LETTER
September 27, 2020      Eliecer Bui III, MD  [address] [place] [address] [address] [place] [address]           [facility] Hematology Oncology  [address] [facility]  [place] [address]  Phone: [phone]  Fax: [phone]          Patient: Pamella Levy   MR Number: [id number]   YOB: 1927   Date of Visit: 9/25/2020       Dear Dr. Eliecer Bui III:    Thank you for referring Pamella Levy to me for evaluation. Attached you will find relevant portions of my assessment and plan of care.    If you have questions, please do not hesitate to call me. I look forward to following Pamella Levy along with you.    Sincerely,    Chiquita Crews MD    Enclosure  CC:  No Recipients    If you would like to receive this communication electronically, please contact [email or web address] or [phone] to request more information on [employer] Link access.    For providers and/or their staff who would like to refer a patient to [facility], please contact us through our one-stop-shop provider referral line, [facility], at [phone].    If you feel you have received this communication in error or would no longer like to receive these types of communications, please e-mail [email or web address]

## 2020-09-25 NOTE — Clinical Note
Patient will be getting radiation therapy.  If possible can you please coordinate visit along with me and IV iron therapy to be given that day.  I will discuss at that visit indication for IV iron therapy

## 2020-09-25 NOTE — PATIENT INSTRUCTIONS
Labs today  Palliative care c/s  RV with IV iron therapy and to discuss further possible hormonal therapy

## 2020-09-26 LAB
FERRITIN SERPL-MCNC: 11 NG/ML (ref 20–300)
IRON SERPL-MCNC: 75 UG/DL (ref 30–160)
SATURATED IRON: 15 % (ref 20–50)
TOTAL IRON BINDING CAPACITY: 494 UG/DL (ref 250–450)
TRANSFERRIN SERPL-MCNC: 334 MG/DL (ref 200–375)

## 2020-09-27 PROBLEM — D50.0 IRON DEFICIENCY ANEMIA DUE TO CHRONIC BLOOD LOSS: Status: ACTIVE | Noted: 2020-09-27

## 2020-09-27 NOTE — PROGRESS NOTES
Subjective:      DATE OF VISIT: 9/25/20     ?  Patient ID:?Pamella Levy is a 89 y.o. female.?? MR#: [id number]   ?   REFERRING PROVIDER: Eliecer Bui III, MD  [address] [place] [address] [place] [address]     ? Primary Care Providers:  Geronimo Rios MD, MD (General)     CHIEF COMPLAINT: ????   ?   ONCOLOGIC DIAGNOSIS:  Endometrial endometrioid carcinoma, FIGO grade 2  ?   CURRENT TREATMENT:  Palliative radiation    PAST TREATMENT:  None  ?   ONCOLOGIC HISTORY:     Per history provided by daughter and review of medical record Ms. Levy and developed pelvic pain and was seen in emergency room found to have mass on CT abdomen pelvis felt to be right adnexal region with follow-up ultrasound revealing uterine fluid, right ovary without abnormality, left ovary not visualized.  She has not had chest imaging.  Endometrial biopsy 08/28/2019 nondiagnostic.  She was seen by Dr. Ximena Thorne. 11/13/19 D&C hysteroscopy with biopsy showed endometrioid carcinoma FIGO 2, ER positive >95%, MS focal positivity moderate to strong, 2-5%.  Dr. Thorne noted not be candidate for aggressive cancer therapies such as major surgery or chemotherapy given agent for LT.  She was referred for consideration of radiation therapy in 2019 at the time elected against.  She was recently seen back with Dr. Thorne due to vaginal bleeding and again palliative radiation was  recommended along with social work and best supportive care with hospice services.  She appears interested in pursuing palliative radiation 20-25 Gy in 5 fractions to help in vaginal bleeding.    HPI    History provided by daughter.  Ms. Levy continues to experience vaginal bleeding and generalized fatigue.    Review of Systems    ?   A comprehensive 14-point review of systems was reviewed with patient and was negative other than as specified above.   ?   PAST MEDICAL HISTORY:   Past Medical History:   Diagnosis Date    Arthritis     Hypertension     Hypertension  10/25/2019    Peripheral vascular disease 10/25/2019    Thickened endometrium 10/25/2019    ?     PAST SURGICAL HISTORY:   Past Surgical History:   Procedure Laterality Date    BKA Left     CARDIAC SURGERY      heart cath     SECTION      x 1    HYSTEROSCOPY WITH DILATION AND CURETTAGE OF UTERUS N/A 2019    Procedure: HYSTEROSCOPY, WITH DILATION AND CURETTAGE OF UTERUS;  Surgeon: Ximena Thorne MD;  [facility];  Service: OB/GYN;  Laterality: N/A;      ?   ALLERGIES:   Allergies as of 2020 - Reviewed 2020   Allergen Reaction Noted    Pregabalin Swelling 2012    Tramadol Hallucinations 2019      ?   MEDICATIONS:?   No outpatient medications have been marked as taking for the 20 encounter (Office Visit) with Chiquita Crews MD.      ?   SOCIAL HISTORY:?   Social History     Tobacco Use    Smoking status: Former Smoker    Smokeless tobacco: Never Used   Substance Use Topics    Alcohol use: Not Currently      ?      ?   FAMILY HISTORY:   Family history is unknown by patient.   ?        Objective:      Physical Exam      ?   Vitals:    20 1533   BP: (!) 148/66   Pulse: 88   Temp: 97.8 °F (36.6 °C)      ?   ECOG:?3   General appearance: Generally ill-appearing, frail, limited speech  Head, eyes, ears, nose, and throat: Pupils round and equally reactive to light. Oropharynx clear with moist mucous membranes.   Respiratory:  Normal work of breathing.  Abdomen:  Scaphoid, Bowel sounds present, soft, nontender, nondistended.   Extremities: Warm, without edema.   Neurologic:  Limited speech, sitting in wheelchair  Skin: No rashes, ecchymoses or petechial lesion.   ?     ?   Laboratory:  ?   Lab Visit on 2020   Component Date Value Ref Range Status    Ferritin 2020 11* 20.0 - 300.0 ng/mL Final    WBC 2020 3.54* 3.90 - 12.70 K/uL Final    RBC 2020 3.18* 4.00 - 5.40 M/uL Final    Hemoglobin 2020 9.5* 12.0 - 16.0 g/dL Final     Hematocrit 09/25/2020 31.1* 37.0 - 48.5 % Final    Mean Corpuscular Volume 09/25/2020 98  82 - 98 fL Final    Mean Corpuscular Hemoglobin 09/25/2020 29.9  27.0 - 31.0 pg Final    Mean Corpuscular Hemoglobin Conc 09/25/2020 30.5* 32.0 - 36.0 g/dL Final    RDW 09/25/2020 13.5  11.5 - 14.5 % Final    Platelets 09/25/2020 374* 150 - 350 K/uL Final    MPV 09/25/2020 9.7  9.2 - 12.9 fL Final    Immature Granulocytes 09/25/2020 0.3  0.0 - 0.5 % Final    Gran # (ANC) 09/25/2020 1.9  1.8 - 7.7 K/uL Final    Immature Grans (Abs) 09/25/2020 0.01  0.00 - 0.04 K/uL Final    Lymph # 09/25/2020 1.2  1.0 - 4.8 K/uL Final    Mono # 09/25/2020 0.4  0.3 - 1.0 K/uL Final    Eos # 09/25/2020 0.0  0.0 - 0.5 K/uL Final    Baso # 09/25/2020 0.04  0.00 - 0.20 K/uL Final    nRBC 09/25/2020 0  0 /100 WBC Final    Gran% 09/25/2020 52.3  38.0 - 73.0 % Final    Lymph% 09/25/2020 34.2  18.0 - 48.0 % Final    Mono% 09/25/2020 11.0  4.0 - 15.0 % Final    Eosinophil% 09/25/2020 1.1  0.0 - 8.0 % Final    Basophil% 09/25/2020 1.1  0.0 - 1.9 % Final    Differential Method 09/25/2020 Automated   Final    Iron 09/25/2020 75  30 - 160 ug/dL Final    Transferrin 09/25/2020 334  200 - 375 mg/dL Final    TIBC 09/25/2020 494* 250 - 450 ug/dL Final    Saturated Iron 09/25/2020 15* 20 - 50 % Final      ?     ?   ?   Imaging:  ?  I have reviewed the patient's medical history in detail and updated the computerized patient record.    No results found for this or any previous visit (from the past 2160 hour(s)).  No results found for this or any previous visit (from the past 2160 hour(s)).  No results found for this or any previous visit (from the past 2160 hour(s)).      Pathology:    I have reviewed the patient's medical history in detail and updated the computerized patient record.       ?   Assessment/Plan:   Endometrial cancer  -     Ambulatory referral/consult to Hematology / Oncology  -     Ambulatory referral/consult to Palliative  Care; Future; Expected date: 09/25/2020  -     Ferritin; Future; Expected date: 09/25/2020  -     CBC auto differential; Future; Expected date: 09/25/2020  -     Iron and TIBC; Future; Expected date: 09/25/2020    Iron deficiency anemia due to chronic blood loss    Vaginal bleeding       1. Endometrial cancer    2. Iron deficiency anemia due to chronic blood loss    3. Vaginal bleeding          Plan:     # endometrial endometrioid carcinoma FIGO grade 2:  Not completely staged, CT abdomen pelvis8/2019 only, seen by Gynecologic Oncology Dr. Thorne and not felt to be a surgical or chemotherapy candidate, declined radiation therapy in 2019, now every presenting due to vaginal bleeding interested in pursuing palliative radiation planned with  e-mail at this time.  She was referred for consideration of hormonal therapy.  ER over 95%.  I did discuss with them that while hormonal therapy can be considered there are few trials with small amounts of patient showing limited response rate but cannot exclude benefit.  I did discuss potential toxicities that would need to be considered.  We will have her return to rediscuss these options after completing radiation therapy.  I do feel that she would benefit from iron supplementation due to iron deficiency anemia severe from ongoing vaginal bleeding.  I will arrange for IV iron therapy and follow-up.    Advance Care Planning     Discuss the role of palliative care and symptom management, advanced care planning and goals of care discussions.  Referral placed.         Follow-Up:   Patient Instructions   Labs today  Palliative care c/s  RV with IV iron therapy and to discuss further possible hormonal therapy

## 2020-09-27 NOTE — PLAN OF CARE
Uterine - No Medical Intervention - Off Treatment.    Patient Characteristics:  Endometrioid Histology, Newly Diagnosed, Medically Inoperable, Clinical Stage I  Histology: Endometrioid Histology  Therapeutic Status: Newly Diagnosed  AJCC T Category: T1b  AJCC N Category: N0  AJCC M Category: M0  AJCC 8 Stage Grouping: IB  Surgical Status: Medically Inoperable

## 2020-09-27 NOTE — PLAN OF CARE
DISCONTINUE ON PATHWAY REGIMEN - Uterine    No Medical Intervention - Off Treatment.    REASON: Disease Progression  PRIOR TREATMENT: [id number]: Referral to Radiation Oncology    START ON PATHWAY REGIMEN - Uterine    [id number]        Megestrol acetate (Megace)     **Always confirm dose/schedule in your pharmacy ordering system**    Patient Characteristics:  Endometrioid Histology, Recurrent/Progressive Disease, Second Line, Persistent   Disease Post First Line Chemotherapy for Metastatic Disease, < 6 Months Since   Previous Chemotherapy  Histology: Endometrioid Histology  Therapeutic Status: Recurrent or Progressive Disease  AJCC T Category: T1b  AJCC N Category: N0  AJCC M Category: M0  AJCC 8 Stage Grouping: IB  Line of Therapy: Second Line  Time to Recurrence: < 6 Months Since Previous Chemotherapy  Intent of Therapy:  Non-Curative / Palliative Intent, Discussed with Patient

## 2020-09-29 ENCOUNTER — DOCUMENTATION ONLY (OUTPATIENT)
Dept: RADIATION ONCOLOGY | Facility: CLINIC | Age: 85
End: 2020-09-29

## 2020-09-29 PROCEDURE — 77014 HC CT GUIDANCE RADIATION THERAPY FLDS PLACEMENT: CPT | Mod: TC | Performed by: RADIOLOGY

## 2020-09-29 PROCEDURE — 77417 THER RADIOLOGY PORT IMAGE(S): CPT | Performed by: RADIOLOGY

## 2020-09-29 PROCEDURE — 77412 RADIATION TX DELIVERY LVL 3: CPT | Performed by: RADIOLOGY

## 2020-09-29 PROCEDURE — 77014 PR  CT GUIDANCE PLACEMENT RAD THERAPY FIELDS: ICD-10-PCS | Mod: 26,,, | Performed by: RADIOLOGY

## 2020-09-29 PROCEDURE — 77014 PR  CT GUIDANCE PLACEMENT RAD THERAPY FIELDS: CPT | Mod: 26,,, | Performed by: RADIOLOGY

## 2020-09-29 NOTE — PLAN OF CARE
Day 1 outpatient xrt to endometrium. Pelvis handout and verbal instructions given. Skin care and side effects reviewed. Contact info provided. Patient verbalized understanding.

## 2020-09-30 ENCOUNTER — DOCUMENTATION ONLY (OUTPATIENT)
Dept: RADIATION ONCOLOGY | Facility: CLINIC | Age: 85
End: 2020-09-30

## 2020-09-30 PROCEDURE — 77014 PR  CT GUIDANCE PLACEMENT RAD THERAPY FIELDS: ICD-10-PCS | Mod: 26,,, | Performed by: RADIOLOGY

## 2020-09-30 PROCEDURE — 77014 PR  CT GUIDANCE PLACEMENT RAD THERAPY FIELDS: CPT | Mod: 26,,, | Performed by: RADIOLOGY

## 2020-09-30 PROCEDURE — 77014 HC CT GUIDANCE RADIATION THERAPY FLDS PLACEMENT: CPT | Mod: TC | Performed by: RADIOLOGY

## 2020-09-30 PROCEDURE — 77412 RADIATION TX DELIVERY LVL 3: CPT | Performed by: RADIOLOGY

## 2020-09-30 NOTE — PLAN OF CARE
Day 2 of outpatient xrt to the pelvis. Denies any pain today just tenderness to the bottom of her abdomen, has a moderate amount of vaginal bleeding after the treatment yesterday but none since. Will continue to monitor.

## 2020-10-01 ENCOUNTER — HOSPITAL ENCOUNTER (OUTPATIENT)
Dept: RADIATION THERAPY | Facility: HOSPITAL | Age: 85
Discharge: HOME | End: 2020-10-01
Attending: RADIOLOGY
Payer: MEDICARE

## 2020-10-01 PROCEDURE — 77014 PR  CT GUIDANCE PLACEMENT RAD THERAPY FIELDS: ICD-10-PCS | Mod: 26,,, | Performed by: RADIOLOGY

## 2020-10-01 PROCEDURE — 77014 HC CT GUIDANCE RADIATION THERAPY FLDS PLACEMENT: CPT | Mod: TC | Performed by: RADIOLOGY

## 2020-10-01 PROCEDURE — 77014 PR  CT GUIDANCE PLACEMENT RAD THERAPY FIELDS: CPT | Mod: 26,,, | Performed by: RADIOLOGY

## 2020-10-01 PROCEDURE — 77412 RADIATION TX DELIVERY LVL 3: CPT | Performed by: RADIOLOGY

## 2020-10-02 PROCEDURE — 77412 RADIATION TX DELIVERY LVL 3: CPT | Performed by: RADIOLOGY

## 2020-10-02 PROCEDURE — 77014 PR  CT GUIDANCE PLACEMENT RAD THERAPY FIELDS: ICD-10-PCS | Mod: 26,,, | Performed by: RADIOLOGY

## 2020-10-02 PROCEDURE — 77014 PR  CT GUIDANCE PLACEMENT RAD THERAPY FIELDS: CPT | Mod: 26,,, | Performed by: RADIOLOGY

## 2020-10-02 PROCEDURE — 77014 HC CT GUIDANCE RADIATION THERAPY FLDS PLACEMENT: CPT | Mod: TC | Performed by: RADIOLOGY

## 2020-10-05 ENCOUNTER — DOCUMENTATION ONLY (OUTPATIENT)
Dept: RADIATION ONCOLOGY | Facility: CLINIC | Age: 85
End: 2020-10-05

## 2020-10-05 PROCEDURE — 77014 HC CT GUIDANCE RADIATION THERAPY FLDS PLACEMENT: CPT | Mod: TC | Performed by: RADIOLOGY

## 2020-10-05 PROCEDURE — 77014 PR  CT GUIDANCE PLACEMENT RAD THERAPY FIELDS: ICD-10-PCS | Mod: 26,,, | Performed by: RADIOLOGY

## 2020-10-05 PROCEDURE — 77412 RADIATION TX DELIVERY LVL 3: CPT | Performed by: RADIOLOGY

## 2020-10-05 PROCEDURE — 77014 PR  CT GUIDANCE PLACEMENT RAD THERAPY FIELDS: CPT | Mod: 26,,, | Performed by: RADIOLOGY

## 2020-10-06 PROCEDURE — 77336 RADIATION PHYSICS CONSULT: CPT | Performed by: RADIOLOGY

## 2020-10-26 DIAGNOSIS — R11.0 NAUSEA: Primary | ICD-10-CM

## 2020-10-26 DIAGNOSIS — C54.1 ENDOMETRIAL CANCER: ICD-10-CM

## 2020-10-26 RX ORDER — ONDANSETRON 4 MG/1
4 TABLET, FILM COATED ORAL EVERY 6 HOURS PRN
Qty: 30 TABLET | Refills: 3 | Status: SHIPPED | OUTPATIENT
Start: 2020-10-26 | End: 2021-05-10

## 2020-11-06 ENCOUNTER — TELEPHONE (OUTPATIENT)
Dept: RADIATION ONCOLOGY | Facility: CLINIC | Age: 85
End: 2020-11-06

## 2020-11-06 NOTE — TELEPHONE ENCOUNTER
Made call to see why patient did not make it to appt today. No answer, left voice message and call back number.

## 2020-11-10 ENCOUNTER — TELEPHONE (OUTPATIENT)
Dept: RADIATION ONCOLOGY | Facility: CLINIC | Age: 85
End: 2020-11-10

## 2020-11-10 NOTE — TELEPHONE ENCOUNTER
"Made call to r/s missed f/u appt. Spoke to family member who stated "they will have to call me back because they were sleeping and out of it" and then phone hung up. Will try again at a later date.  "

## 2020-11-18 ENCOUNTER — OFFICE VISIT (OUTPATIENT)
Dept: INTERNAL MEDICINE | Facility: CLINIC | Age: 85
End: 2020-11-18
Payer: MEDICARE

## 2020-11-18 VITALS
OXYGEN SATURATION: 97 % | TEMPERATURE: 98 F | SYSTOLIC BLOOD PRESSURE: 122 MMHG | HEART RATE: 79 BPM | DIASTOLIC BLOOD PRESSURE: 64 MMHG | BODY MASS INDEX: 18.3 KG/M2 | RESPIRATION RATE: 16 BRPM | HEIGHT: 65 IN

## 2020-11-18 DIAGNOSIS — I10 ESSENTIAL HYPERTENSION: ICD-10-CM

## 2020-11-18 DIAGNOSIS — L98.499 ARTERIAL INSUFFICIENCY WITH ISCHEMIC ULCER: Primary | ICD-10-CM

## 2020-11-18 DIAGNOSIS — I77.1 ARTERIAL INSUFFICIENCY WITH ISCHEMIC ULCER: Primary | ICD-10-CM

## 2020-11-18 DIAGNOSIS — I73.9 PVD (PERIPHERAL VASCULAR DISEASE): ICD-10-CM

## 2020-11-18 PROCEDURE — G0008 ADMIN INFLUENZA VIRUS VAC: HCPCS | Mod: PBBFAC,PO

## 2020-11-18 PROCEDURE — 90694 VACC AIIV4 NO PRSRV 0.5ML IM: CPT | Mod: PBBFAC,PO

## 2020-11-18 PROCEDURE — 99213 PR OFFICE/OUTPT VISIT, EST, LEVL III, 20-29 MIN: ICD-10-PCS | Mod: S$PBB,,, | Performed by: FAMILY MEDICINE

## 2020-11-18 PROCEDURE — 99213 OFFICE O/P EST LOW 20 MIN: CPT | Mod: S$PBB,,, | Performed by: FAMILY MEDICINE

## 2020-11-18 PROCEDURE — 99214 OFFICE O/P EST MOD 30 MIN: CPT | Mod: PBBFAC,PO | Performed by: FAMILY MEDICINE

## 2020-11-18 PROCEDURE — 99999 PR PBB SHADOW E&M-EST. PATIENT-LVL IV: CPT | Mod: PBBFAC,,, | Performed by: FAMILY MEDICINE

## 2020-11-18 PROCEDURE — 99999 PR PBB SHADOW E&M-EST. PATIENT-LVL IV: ICD-10-PCS | Mod: PBBFAC,,, | Performed by: FAMILY MEDICINE

## 2020-11-18 RX ORDER — HYDROCODONE BITARTRATE AND ACETAMINOPHEN 5; 325 MG/1; MG/1
1 TABLET ORAL EVERY 8 HOURS PRN
Qty: 21 TABLET | Refills: 0 | Status: SHIPPED | OUTPATIENT
Start: 2020-11-18 | End: 2021-05-10

## 2020-11-20 ENCOUNTER — TELEPHONE (OUTPATIENT)
Dept: INTERNAL MEDICINE | Facility: CLINIC | Age: 85
End: 2020-11-20

## 2020-11-20 PROBLEM — L98.499 ARTERIAL INSUFFICIENCY WITH ISCHEMIC ULCER: Status: ACTIVE | Noted: 2020-11-20

## 2020-11-20 PROBLEM — I77.1 ARTERIAL INSUFFICIENCY WITH ISCHEMIC ULCER: Status: ACTIVE | Noted: 2020-11-20

## 2020-11-20 NOTE — TELEPHONE ENCOUNTER
Called pt to f/u with podiatry per dr. Samaniego. No answer, vm not setup. Pt can not get in with wound care until after 11/30 due to holidays

## 2020-11-20 NOTE — PROGRESS NOTES
Subjective:       Patient ID: Pamella Levy is a 89 y.o. female.    Chief Complaint: Establish Care    HPI  Patient presenting today as a new patient with chief complaint ulceration her right great toe that has been painful worsening on the last couple of weeks.  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  She has a podiatrist Dr. Mccain and has been to outpatient woundcare in the past. No fever/chills.  Also recently treated with radiation for endometrial cancer.     Family History   Problem Relation Age of Onset    Hypertension Sister     Stroke Sister     Hypertension Brother     Cancer Sister     Heart disease Brother     Heart attack Brother     Diabetes Brother     Stroke Brother     Heart disease Brother     Diabetes Sister     Stroke Sister     Heart disease Sister        Current Outpatient Medications:     acetaminophen (TYLENOL) 650 MG TbSR, Take 650 mg by mouth every 8 (eight) hours as needed., Disp: , Rfl:     amlodipine-olmesartan (RUTH) 10-20 mg per tablet, Take 1 tablet by mouth Daily., Disp: 90 tablet, Rfl: 0    aspirin (ECOTRIN) 81 MG EC tablet, Take 81 mg by mouth Daily., Disp: , Rfl:     diclofenac sodium (VOLTAREN) 1 % Gel, Apply 2 g topically 2 (two) times daily., Disp: 100 g, Rfl: 2    ferrous sulfate (FEOSOL) 325 mg (65 mg iron) Tab tablet, Take 1 tablet by mouth Daily., Disp: , Rfl:     ondansetron (ZOFRAN) 4 MG tablet, Take 1 tablet (4 mg total) by mouth every 6 (six) hours as needed for Nausea., Disp: 30 tablet, Rfl: 3    ferrous sulfate (SLOW FE) 142 mg (45 mg iron) TbSR, Take 1 tablet by mouth once daily., Disp: , Rfl:     HYDROcodone-acetaminophen (NORCO) 5-325 mg per tablet, Take 1 tablet by mouth every 8 (eight) hours as needed for Pain., Disp: 21 tablet, Rfl: 0    Review of Systems   Constitutional: Negative for chills and fever.   HENT: Negative for  "congestion and sore throat.         Hearing impaired   Eyes: Negative for visual disturbance.   Respiratory: Negative for cough and shortness of breath.    Cardiovascular: Negative for chest pain.   Gastrointestinal: Negative for abdominal pain, constipation and diarrhea.   Endocrine: Negative for polydipsia and polyuria.   Genitourinary: Negative for difficulty urinating and menstrual problem.   Skin: Positive for wound. Negative for rash.   Neurological: Negative for dizziness.   Hematological: Does not bruise/bleed easily.       Objective:   /64 (BP Location: Left arm, Patient Position: Sitting, BP Method: Small (Manual))   Pulse 79   Temp 97.9 °F (36.6 °C) (Temporal)   Resp 16   Ht 5' 5" (1.651 m)   LMP  (LMP Unknown)   SpO2 97%   BMI 18.30 kg/m²      Physical Exam  Constitutional:       General: She is not in acute distress.     Appearance: She is well-developed. She is not diaphoretic.   HENT:      Head: Normocephalic and atraumatic.      Nose: Nose normal.   Eyes:      General:         Right eye: No discharge.         Left eye: No discharge.      Conjunctiva/sclera: Conjunctivae normal.      Pupils: Pupils are equal, round, and reactive to light.   Neck:      Thyroid: No thyromegaly.   Cardiovascular:      Rate and Rhythm: Normal rate and regular rhythm.      Pulses:           Left popliteal pulse not accessible.        Dorsalis pedis pulses are 0 on the right side.      Heart sounds: Murmur present.   Pulmonary:      Effort: Pulmonary effort is normal. No respiratory distress.      Breath sounds: Normal breath sounds.   Abdominal:      General: There is no distension.      Palpations: Abdomen is soft.   Skin:     Findings: No rash.   Neurological:      Mental Status: She is alert and oriented to person, place, and time.   Psychiatric:         Behavior: Behavior normal.                 Assessment & Plan     Problem List Items Addressed This Visit        Cardiac/Vascular    PVD (peripheral " vascular disease)    Current Assessment & Plan     High risk for future amputation for poorly healing wound         Hypertension    Current Assessment & Plan     Stable currently         Arterial insufficiency with ischemic ulcer - Primary    Current Assessment & Plan     Advised daughter and patient to get appt with podiatry. Opted not to start antibiotics at this time. No signs of systemic disease. She may need wound culture to guide antibiotic therapy. Provided with pain control         Relevant Orders    Ambulatory referral/consult to Wound Clinic            Immunizations Administered on Date of Encounter - 11/18/2020     Name Date Dose VIS Date Route Exp Date    Influenza (FLUAD) - Quadrivalent - Adjuvanted - PF *Preferred* (65+) 11/18/2020  4:05 PM 0.5 mL 8/15/2019 Intramuscular 6/30/2021    Site: Left deltoid     Given By: Megan Casey LPN     : [employer]     Lot: [id number]            No follow-ups on file.    Disclaimer:  This note may have been prepared using voice recognition software, it may have not been extensively proofed, as such there could be errors within the text such as sound alike errors.

## 2020-11-20 NOTE — ASSESSMENT & PLAN NOTE
Advised daughter and patient to get appt with podiatry. Opted not to start antibiotics at this time. No signs of systemic disease. She may need wound culture to guide antibiotic therapy. Provided with pain control

## 2020-11-24 ENCOUNTER — TELEPHONE (OUTPATIENT)
Dept: HEMATOLOGY/ONCOLOGY | Facility: CLINIC | Age: 85
End: 2020-11-24

## 2020-11-24 ENCOUNTER — DOCUMENTATION ONLY (OUTPATIENT)
Dept: HEMATOLOGY/ONCOLOGY | Facility: CLINIC | Age: 85
End: 2020-11-24

## 2020-11-24 NOTE — PROGRESS NOTES
Sw printed handout of housing resources for patient's daughter. Sw mailed out resources on today. Sw will f/u to see if Ermelinda received them. Sw will continue to f/u as needed.

## 2020-11-24 NOTE — TELEPHONE ENCOUNTER
Patient's daughter jian called on today. Jian asked for housing resources. Sw reports she can mail her some resources in her area. Lamberto addressed pt's DS score of 10. Jian reports she scored a 10 due to housing. She reports she and patient needs better housing. Lamberto will mail Jian some resources and follow up as needed.

## 2020-12-02 ENCOUNTER — TELEPHONE (OUTPATIENT)
Dept: INTERNAL MEDICINE | Facility: CLINIC | Age: 85
End: 2020-12-02

## 2020-12-02 NOTE — TELEPHONE ENCOUNTER
----- Message from Rebekah Ron sent at 12/2/2020 10:36 AM CST -----  Regarding: Ms. Fuchs @ [facility] # [phone]  Ms. Fuchs is calling in regards to her wanting to let you know that she cannot get in touch with the patient in regards to scheduling a wound care appointment for her.

## 2020-12-08 ENCOUNTER — TELEPHONE (OUTPATIENT)
Dept: PALLIATIVE MEDICINE | Facility: CLINIC | Age: 85
End: 2020-12-08

## 2020-12-08 ENCOUNTER — OFFICE VISIT (OUTPATIENT)
Dept: RADIATION ONCOLOGY | Facility: CLINIC | Age: 85
End: 2020-12-08
Payer: MEDICARE

## 2020-12-08 ENCOUNTER — INITIAL CONSULT (OUTPATIENT)
Dept: PALLIATIVE MEDICINE | Facility: CLINIC | Age: 85
End: 2020-12-08
Payer: MEDICARE

## 2020-12-08 VITALS
HEART RATE: 95 BPM | OXYGEN SATURATION: 99 % | RESPIRATION RATE: 16 BRPM | RESPIRATION RATE: 17 BRPM | DIASTOLIC BLOOD PRESSURE: 67 MMHG | TEMPERATURE: 99 F | HEIGHT: 65 IN | HEART RATE: 95 BPM | BODY MASS INDEX: 18.33 KG/M2 | SYSTOLIC BLOOD PRESSURE: 142 MMHG | SYSTOLIC BLOOD PRESSURE: 142 MMHG | DIASTOLIC BLOOD PRESSURE: 67 MMHG | WEIGHT: 110 LBS | TEMPERATURE: 99 F | OXYGEN SATURATION: 99 %

## 2020-12-08 DIAGNOSIS — C54.1 ENDOMETRIAL CANCER: ICD-10-CM

## 2020-12-08 DIAGNOSIS — Z71.89 ACP (ADVANCE CARE PLANNING): ICD-10-CM

## 2020-12-08 DIAGNOSIS — C54.1 ENDOMETRIAL CANCER: Primary | ICD-10-CM

## 2020-12-08 DIAGNOSIS — D50.0 IRON DEFICIENCY ANEMIA DUE TO CHRONIC BLOOD LOSS: Primary | ICD-10-CM

## 2020-12-08 PROCEDURE — 99213 OFFICE O/P EST LOW 20 MIN: CPT | Mod: S$PBB,,, | Performed by: RADIOLOGY

## 2020-12-08 PROCEDURE — 99213 OFFICE O/P EST LOW 20 MIN: CPT | Mod: PBBFAC,25 | Performed by: RADIOLOGY

## 2020-12-08 PROCEDURE — 99999 PR PBB SHADOW E&M-EST. PATIENT-LVL III: ICD-10-PCS | Mod: PBBFAC,,, | Performed by: RADIOLOGY

## 2020-12-08 PROCEDURE — 99999 PR PBB SHADOW E&M-EST. PATIENT-LVL V: ICD-10-PCS | Mod: PBBFAC,,, | Performed by: FAMILY MEDICINE

## 2020-12-08 PROCEDURE — 99215 PR OFFICE/OUTPT VISIT, EST, LEVL V, 40-54 MIN: ICD-10-PCS | Mod: S$PBB,,, | Performed by: FAMILY MEDICINE

## 2020-12-08 PROCEDURE — 99999 PR PBB SHADOW E&M-EST. PATIENT-LVL V: CPT | Mod: PBBFAC,,, | Performed by: FAMILY MEDICINE

## 2020-12-08 PROCEDURE — 99213 PR OFFICE/OUTPT VISIT, EST, LEVL III, 20-29 MIN: ICD-10-PCS | Mod: S$PBB,,, | Performed by: RADIOLOGY

## 2020-12-08 PROCEDURE — 99497 ADVNCD CARE PLAN 30 MIN: CPT | Mod: S$PBB,,, | Performed by: FAMILY MEDICINE

## 2020-12-08 PROCEDURE — 99497 PR ADVNCD CARE PLAN 30 MIN: ICD-10-PCS | Mod: S$PBB,,, | Performed by: FAMILY MEDICINE

## 2020-12-08 PROCEDURE — 99497 ADVNCD CARE PLAN 30 MIN: CPT | Mod: PBBFAC | Performed by: FAMILY MEDICINE

## 2020-12-08 PROCEDURE — 99999 PR PBB SHADOW E&M-EST. PATIENT-LVL III: CPT | Mod: PBBFAC,,, | Performed by: RADIOLOGY

## 2020-12-08 PROCEDURE — 99215 OFFICE O/P EST HI 40 MIN: CPT | Mod: S$PBB,,, | Performed by: FAMILY MEDICINE

## 2020-12-08 PROCEDURE — 99215 OFFICE O/P EST HI 40 MIN: CPT | Mod: PBBFAC,27 | Performed by: FAMILY MEDICINE

## 2020-12-08 RX ORDER — HYDROCODONE BITARTRATE AND ACETAMINOPHEN 5; 325 MG/1; MG/1
1 TABLET ORAL EVERY 6 HOURS PRN
Qty: 30 TABLET | Refills: 0 | Status: SHIPPED | OUTPATIENT
Start: 2020-12-08 | End: 2021-05-10

## 2020-12-08 NOTE — LETTER
December 9, 2020      [place]iquita Crews [place]  [address] [address] [place] [address]           [facility] [address] [place] [address]  Phone: [phone]  Fax: [phone]          Patient: Pamella Levy   MR Number: [id number]   YOB: 1927   Date of Visit: 12/8/2020       Dear Dr. Chiquita Crews:    Thank you for referring Pamella Levy to me for evaluation. Attached you will find relevant portions of my assessment and plan of care.    If you have questions, please do not hesitate to call me. I look forward to following Pamella Levy along with you.    Sincerely,    Sydnee Hancock MD    Enclosure  CC:  No Recipients    If you would like to receive this communication electronically, please contact [email or web address] or [phone] to request more information on [employer] Link access.    For providers and/or their staff who would like to refer a patient to [facility], please contact us through our one-stop-shop provider referral line, [facility], at [phone].    If you feel you have received this communication in error or would no longer like to receive these types of communications, please e-mail [email or web address]

## 2020-12-08 NOTE — PROGRESS NOTES
Subjective:       Patient ID: Pamella Levy is a 89 y.o. female.    Chief Complaint: Endometrial Cancer    89 yo female with endometrial cancer s/p palliative radiation for vaginal bleeding, with good result, seen for palliative consultation. She is accompanied by her daughter who contributes to the history. We discussed the role of palliative care in pain and symptom management, goals of care discussions, home based healthcare service coordination, and advanced care planning.     She does report aches and pains particularly in her shoulder, and back, sometimes in her pelvis. She has had pain medication in the past for brief periods and did well with it; currently uses tylenol without any notable benefit. She has had substantial weight loss but does eat and drink fairly well according to her daughter.     Advance Care Planning    Power of   I initiated the process of advance care planning today and explained the importance of this process to the patient.  I introduced the concept of advance directives to the patient, as well. Then the patient received detailed information about the importance of designating a Health Care Power of  (HCPOA). She was also instructed to communicate with this person about their wishes for future healthcare, should she become sick and lose decision-making capacity. The patient has not previously appointed a HCPOA. After our discussion, the patient has decided to complete a HCPOA and has appointed her daughter, NAME:Ermelinda Stern. I spent a total time of 14 minutes discussing this issue with the patient.         Code Status  In light of the patients advanced and life limiting illness,I engaged the the patient and family in a conversation about the patient's preferences for care  at the very end of life. The patient wishes to have a natural, peaceful death.  Along those lines, the patient does not wish to have CPR or other invasive treatments performed when her heart and/or  breathing stops. I communicated to the patient and family that a DNR order would be placed in her medical record to reflect this preference and LaPOST form was completed to reflect other EOL preferences of the patient such as no intubation or artificial nutrition.  I spent a total of 10 minutes engaging the patient in this advance care planning discussion.           does not have any pertinent problems on file.  Past Medical History:   Diagnosis Date    Arthritis     Cancer     Hypertension     Hypertension 10/25/2019    Myocardial infarction     Peripheral vascular disease 10/25/2019    Stroke     TIA    Thickened endometrium 10/25/2019     Past Surgical History:   Procedure Laterality Date    BKA Left     CARDIAC SURGERY      heart cath     SECTION      x 1    HYSTEROSCOPY WITH DILATION AND CURETTAGE OF UTERUS N/A 2019    Procedure: HYSTEROSCOPY, WITH DILATION AND CURETTAGE OF UTERUS;  Surgeon: Ximena Thorne MD;  Location: [facility];  Service: OB/GYN;  Laterality: N/A;     Family History   Problem Relation Age of Onset    Hypertension Sister     Stroke Sister     Hypertension Brother     Cancer Sister     Heart disease Brother     Heart attack Brother     Diabetes Brother     Stroke Brother     Heart disease Brother     Diabetes Sister     Stroke Sister     Heart disease Sister      Social History     Socioeconomic History    Marital status:      Spouse name: Not on file    Number of children: Not on file    Years of education: Not on file    Highest education level: Not on file   Occupational History    Not on file   Social Needs    Financial resource strain: Not on file    Food insecurity     Worry: Not on file     Inability: Not on file    Transportation needs     Medical: Not on file     Non-medical: Not on file   Tobacco Use    Smoking status: Former Smoker     Types: Cigarettes    Smokeless tobacco: Never Used   Substance and Sexual Activity    Alcohol  use: Not Currently    Drug use: Not Currently    Sexual activity: Not Currently   Lifestyle    Physical activity     Days per week: Not on file     Minutes per session: Not on file    Stress: Not at all   Relationships    Social connections     Talks on phone: Not on file     Gets together: Not on file     Attends Christianity service: Not on file     Active member of club or organization: Not on file     Attends meetings of clubs or organizations: Not on file     Relationship status: Not on file   Other Topics Concern    Not on file   Social History Narrative    Not on file     Review of Systems   A comprehensive 14-point review of systems was reviewed with patient and was negative other than as specified above.     Objective:     Vitals:    12/08/20 1441   BP: (!) 142/67   Pulse: 95   Resp: 16   Temp: 98.8 °F (37.1 °C)        Physical Exam  Vitals signs and nursing note reviewed.   Constitutional:       Appearance: She is well-developed. She is ill-appearing.      Comments: Frail, elderly   HENT:      Head: Normocephalic and atraumatic.      Comments: Decreased hearing  Eyes:      Pupils: Pupils are equal, round, and reactive to light.   Neck:      Musculoskeletal: Normal range of motion and neck supple.   Cardiovascular:      Rate and Rhythm: Normal rate and regular rhythm.   Pulmonary:      Effort: Pulmonary effort is normal.      Breath sounds: Normal breath sounds.   Musculoskeletal: Normal range of motion.         General: No deformity.      Comments: BKA due to vascular disease   Skin:     General: Skin is warm and dry.   Neurological:      Mental Status: She is alert and oriented to person, place, and time.   Psychiatric:         Behavior: Behavior normal.         Palliative Exam  Advance Care Planning   Advance Directives:   Living Will: Yes    LaPOST: Yes    Do Not Resuscitate Status: Yes    Medical Power of : Yes      Decision Making:  Patient answered questions and Family answered  questions         Assessment:       1. Iron deficiency anemia due to chronic blood loss    2. Endometrial cancer    3. ACP (advance care planning)        Plan:           Problem List Items Addressed This Visit        Oncology    Endometrial cancer    Relevant Medications    HYDROcodone-acetaminophen (NORCO) 5-325 mg per tablet    Iron deficiency anemia due to chronic blood loss - Primary    Relevant Medications    ferrous sulfate (SLOW FE) 142 mg (45 mg iron) TbSR       Palliative Care    ACP (advance care planning)    Overview     MARIA L and Bruce completed at today's visit             We did discuss home care based resources and my recommendation that hospice would likely provide the most assistance. She has an upcoming appointment with Dr. Crews and there is a possibility that she will start hormonal therapy as well as IV iron infusions, so we will wait on that appointment to further elucidate the plan for her going forward. I will schedule follow up in 2 months; sooner if needed. Provided with direct line to palliative care office.     Educated on risks and benefits of opiate education. Advised to start with 1/2 tab hydrocodone 5 mg. Constipation prevention and treatment information provided verbally and in check out instructions.    Sydnee Hancock MD  > 50% of  60    min visit spent in chart review, face to face discussion of goals of care, symptom assessment, coordination of care and emotional support  Complexity of decision making HIGH based on severity of patients illnesss, advanced age, chronic medical conditions. High risk medication use requires careful dosing and monitoring due to risk of serious side effects.   An additional 20 minutes were spent in ACP education, discussion, and document completion.

## 2020-12-08 NOTE — TELEPHONE ENCOUNTER
Nurse called pt's contact numbers listed in epic to confirm her apt today, no answer, message left on voice mail.

## 2020-12-08 NOTE — PROGRESS NOTES
[facility]  RADIATION ONCOLOGY FOLLOW UP    Name: Pamella Levy : 3/1/1927     DIAGNOSIS: endometrial carcinoma, endometrioid type, FIGO grade 2, non-operative    TREATMENT HISTORY: 25Gy/5fx to uterus/pelvis for vaginal bleeding completed 10/5/20    INTERVAL HISTORY: Pamella Levy is a pleasant 89 y.o. female who presents today for follow-up.  This is their first follow up since completing radiation. During treatment, she did well.  Since then, she had diarrhea (she had constipation prior to radiation) requiring multiple Imodium.  Today, she notes intermittent difficulty sleeping.  Bowel movements have returned to normal with some constipation. Not taking Imodium.  Vaginal bleeding has stopped.  She will meet with Dr. Hancock today.    PHYSICAL EXAM:   Constitutional: well appearing, no acute distress, ECOG 3 - Confined to bed or chair 50% of waking hours  Vitals:    BP (!) 142/67   Pulse 95   Temp 98.8 °F (37.1 °C)   Resp 17   LMP  (LMP Unknown)   SpO2 99%   Eyes: sclera anicteric, EOMI, pupils equal, round and reactive to light  ENT: oral cavity without lesions, moist mucous membranes  Neck: trachea midline, neck supple  Cardiovascular: regular rate, no murmurs, no edema of the upper or lower extremities, radial pulse 2+  Respiratory: unlabored effort, clear to auscultation, no wheezes  Abdomen: soft, non-tender, no rigidity, no masses, no hepatomegaly  GYN: Deferred    Laboratory & X-Ray Findings: Per above.  Images reviewed personally.    ASSESSMENT: recovered well from acute toxicities of radiation with resolution of vaginal bleeding.    PLAN: Ms. Levy had some radiation induced diarrhea which has improved. She can take miralax or fiber as needed to avoid constipation.  Her vaginal bleeding has completely resolved.  Continue f/u with palliative care and Gyn/Onc as needed.    Follow up with me as needed. If bleeding returns, could consider additional quadshot radiation for  palliation but would prefer to hold for at least 3-6 months.    I spent approximately 15 minutes reviewing the available records and evaluating the patient, out of which over 50% of the time was spent face to face with the patient in counseling and coordinating this patient's care.    Eliecer Bui III, M.D.  Radiation Oncology  [facility] [address] [place] [address]  Ph: [phone]  [email or web address]

## 2020-12-09 PROBLEM — Z71.89 ACP (ADVANCE CARE PLANNING): Status: ACTIVE | Noted: 2020-12-09

## 2020-12-10 ENCOUNTER — TELEPHONE (OUTPATIENT)
Dept: HEMATOLOGY/ONCOLOGY | Facility: CLINIC | Age: 85
End: 2020-12-10

## 2020-12-10 NOTE — TELEPHONE ENCOUNTER
----- Message from Chiquita Crews MD sent at 12/10/2020  7:20 AM CST -----  Regarding: iv iron and RV?  Per my last clinic ago she was supposed to get IV iron in follow-up with me.  Can you please see why this has not been done?  She did not make her appointment with me earlier this week.

## 2020-12-10 NOTE — TELEPHONE ENCOUNTER
----- Message from Saskia George sent at 12/10/2020  3:52 PM CST -----  Contact: Pt  .Type:  Patient Returning Call    Who Called: Pamella  Who Left Message for Patient: Mary  Does the patient know what this is regarding?: missed call  Would the patient rather a call back or a response via [employer]? callback  Best Call Back Number: [phone]    Additional Information:

## 2020-12-10 NOTE — TELEPHONE ENCOUNTER
Iron infusion scheduled for 12/14 at 2:30 and 12/21 at 2:00 at [facility] location.Verbalized understanding

## 2020-12-11 RX ORDER — SODIUM CHLORIDE 0.9 % (FLUSH) 0.9 %
10 SYRINGE (ML) INJECTION
Status: CANCELLED | OUTPATIENT
Start: 2020-12-11

## 2020-12-11 RX ORDER — HEPARIN 100 UNIT/ML
500 SYRINGE INTRAVENOUS
Status: CANCELLED | OUTPATIENT
Start: 2020-12-18

## 2020-12-11 RX ORDER — HEPARIN 100 UNIT/ML
500 SYRINGE INTRAVENOUS
Status: CANCELLED | OUTPATIENT
Start: 2020-12-11

## 2020-12-11 RX ORDER — SODIUM CHLORIDE 0.9 % (FLUSH) 0.9 %
10 SYRINGE (ML) INJECTION
Status: CANCELLED | OUTPATIENT
Start: 2020-12-18

## 2020-12-17 ENCOUNTER — INFUSION (OUTPATIENT)
Dept: INFUSION THERAPY | Facility: HOSPITAL | Age: 85
End: 2020-12-17
Attending: INTERNAL MEDICINE
Payer: MEDICARE

## 2020-12-17 VITALS
TEMPERATURE: 99 F | HEART RATE: 83 BPM | SYSTOLIC BLOOD PRESSURE: 158 MMHG | RESPIRATION RATE: 18 BRPM | DIASTOLIC BLOOD PRESSURE: 85 MMHG | OXYGEN SATURATION: 98 %

## 2020-12-17 DIAGNOSIS — D50.0 IRON DEFICIENCY ANEMIA DUE TO CHRONIC BLOOD LOSS: Primary | ICD-10-CM

## 2020-12-17 PROCEDURE — 96365 THER/PROPH/DIAG IV INF INIT: CPT

## 2020-12-17 PROCEDURE — 25000003 PHARM REV CODE 250: Performed by: INTERNAL MEDICINE

## 2020-12-17 PROCEDURE — 63600175 PHARM REV CODE 636 W HCPCS: Mod: JG | Performed by: INTERNAL MEDICINE

## 2020-12-17 RX ADMIN — FERUMOXYTOL 510 MG: 510 INJECTION INTRAVENOUS at 03:12

## 2020-12-17 NOTE — DISCHARGE INSTRUCTIONS
[facility]  [address] [facility]  [address]  [phone] phone     [phone] fax  Hours of Operation: Monday- Friday 8:00am- 5:00pm  After hours phone  [phone]  Hematology / Oncology Physicians on call      Dr. Jose David Cummings, EDUARD Santizo NP Tyesha Taylor, NP    Please call with any concerns regarding your appointment today.    FALL PREVENTION   Falls often occur due to slipping, tripping or losing your balance. Here are ways to reduce your risk of falling again.   Was there anything that caused your fall that can be fixed, removed or replaced?   Make your home safe by keeping walkways clear of objects you may trip over.   Use non-slip pads under rugs.   Do not walk in poorly lit areas.   Do not stand on chairs or wobbly ladders.   Use caution when reaching overhead or looking upward. This position can cause a loss of balance.   Be sure your shoes fit properly, have non-slip bottoms and are in good condition.   Be cautious when going up and down stairs, curbs, and when walking on uneven sidewalks.   If your balance is poor, consider using a cane or walker.   If your fall was related to alcohol use, stop or limit alcohol intake.   If your fall was related to use of sleeping medicines, talk to your doctor about this. You may need to reduce your dosage at bedtime if you awaken during the night to go to the bathroom.   To reduce the need for nighttime bathroom trips:   Avoid drinking fluids for several hours before going to bed   Empty your bladder before going to bed   Men can keep a urinal at the bedside   © 7448-4570 [facility] [facility], [address] [place] [address], [place] [address]. All rights reserved. This information is not intended as a substitute for professional medical care. Always follow your healthcare professional's instructions.

## 2020-12-21 ENCOUNTER — INFUSION (OUTPATIENT)
Dept: INFUSION THERAPY | Facility: HOSPITAL | Age: 85
End: 2020-12-21
Attending: INTERNAL MEDICINE
Payer: MEDICARE

## 2020-12-21 VITALS
WEIGHT: 110 LBS | RESPIRATION RATE: 16 BRPM | TEMPERATURE: 98 F | OXYGEN SATURATION: 100 % | HEART RATE: 70 BPM | DIASTOLIC BLOOD PRESSURE: 57 MMHG | BODY MASS INDEX: 18.33 KG/M2 | HEIGHT: 65 IN | SYSTOLIC BLOOD PRESSURE: 143 MMHG

## 2020-12-21 DIAGNOSIS — D50.0 IRON DEFICIENCY ANEMIA DUE TO CHRONIC BLOOD LOSS: Primary | ICD-10-CM

## 2020-12-21 PROCEDURE — 96365 THER/PROPH/DIAG IV INF INIT: CPT

## 2020-12-21 PROCEDURE — 63600175 PHARM REV CODE 636 W HCPCS: Mod: JG | Performed by: INTERNAL MEDICINE

## 2020-12-21 PROCEDURE — 25000003 PHARM REV CODE 250: Performed by: INTERNAL MEDICINE

## 2020-12-21 RX ADMIN — FERUMOXYTOL 510 MG: 510 INJECTION INTRAVENOUS at 02:12

## 2021-01-19 ENCOUNTER — LAB VISIT (OUTPATIENT)
Dept: LAB | Facility: HOSPITAL | Age: 86
End: 2021-01-19
Attending: INTERNAL MEDICINE
Payer: MEDICARE

## 2021-01-19 ENCOUNTER — OFFICE VISIT (OUTPATIENT)
Dept: HEMATOLOGY/ONCOLOGY | Facility: CLINIC | Age: 86
End: 2021-01-19
Payer: MEDICARE

## 2021-01-19 VITALS
BODY MASS INDEX: 18.33 KG/M2 | WEIGHT: 110 LBS | TEMPERATURE: 98 F | HEART RATE: 72 BPM | SYSTOLIC BLOOD PRESSURE: 185 MMHG | DIASTOLIC BLOOD PRESSURE: 72 MMHG | OXYGEN SATURATION: 98 % | HEIGHT: 65 IN

## 2021-01-19 DIAGNOSIS — C54.1 ENDOMETRIAL CANCER: ICD-10-CM

## 2021-01-19 DIAGNOSIS — D50.0 IRON DEFICIENCY ANEMIA DUE TO CHRONIC BLOOD LOSS: ICD-10-CM

## 2021-01-19 DIAGNOSIS — D50.0 IRON DEFICIENCY ANEMIA DUE TO CHRONIC BLOOD LOSS: Primary | ICD-10-CM

## 2021-01-19 LAB
BASOPHILS # BLD AUTO: 0.03 K/UL (ref 0–0.2)
BASOPHILS NFR BLD: 1 % (ref 0–1.9)
DIFFERENTIAL METHOD: ABNORMAL
EOSINOPHIL # BLD AUTO: 0 K/UL (ref 0–0.5)
EOSINOPHIL NFR BLD: 1 % (ref 0–8)
ERYTHROCYTE [DISTWIDTH] IN BLOOD BY AUTOMATED COUNT: 22.7 % (ref 11.5–14.5)
HCT VFR BLD AUTO: 36.2 % (ref 37–48.5)
HGB BLD-MCNC: 11 G/DL (ref 12–16)
IMM GRANULOCYTES # BLD AUTO: 0 K/UL (ref 0–0.04)
IMM GRANULOCYTES NFR BLD AUTO: 0 % (ref 0–0.5)
LYMPHOCYTES # BLD AUTO: 0.9 K/UL (ref 1–4.8)
LYMPHOCYTES NFR BLD: 28.9 % (ref 18–48)
MCH RBC QN AUTO: 29.4 PG (ref 27–31)
MCHC RBC AUTO-ENTMCNC: 30.4 G/DL (ref 32–36)
MCV RBC AUTO: 97 FL (ref 82–98)
MONOCYTES # BLD AUTO: 0.3 K/UL (ref 0.3–1)
MONOCYTES NFR BLD: 10 % (ref 4–15)
NEUTROPHILS # BLD AUTO: 1.8 K/UL (ref 1.8–7.7)
NEUTROPHILS NFR BLD: 59.1 % (ref 38–73)
NRBC BLD-RTO: 0 /100 WBC
PLATELET # BLD AUTO: 271 K/UL (ref 150–350)
PMV BLD AUTO: 9.1 FL (ref 9.2–12.9)
RBC # BLD AUTO: 3.74 M/UL (ref 4–5.4)
WBC # BLD AUTO: 3.11 K/UL (ref 3.9–12.7)

## 2021-01-19 PROCEDURE — 82728 ASSAY OF FERRITIN: CPT

## 2021-01-19 PROCEDURE — 99999 PR PBB SHADOW E&M-EST. PATIENT-LVL IV: ICD-10-PCS | Mod: PBBFAC,,, | Performed by: INTERNAL MEDICINE

## 2021-01-19 PROCEDURE — 99214 PR OFFICE/OUTPT VISIT, EST, LEVL IV, 30-39 MIN: ICD-10-PCS | Mod: S$PBB,,, | Performed by: INTERNAL MEDICINE

## 2021-01-19 PROCEDURE — 36415 COLL VENOUS BLD VENIPUNCTURE: CPT

## 2021-01-19 PROCEDURE — 83540 ASSAY OF IRON: CPT

## 2021-01-19 PROCEDURE — 99214 OFFICE O/P EST MOD 30 MIN: CPT | Mod: PBBFAC | Performed by: INTERNAL MEDICINE

## 2021-01-19 PROCEDURE — 99999 PR PBB SHADOW E&M-EST. PATIENT-LVL IV: CPT | Mod: PBBFAC,,, | Performed by: INTERNAL MEDICINE

## 2021-01-19 PROCEDURE — 85025 COMPLETE CBC W/AUTO DIFF WBC: CPT

## 2021-01-19 PROCEDURE — 99214 OFFICE O/P EST MOD 30 MIN: CPT | Mod: S$PBB,,, | Performed by: INTERNAL MEDICINE

## 2021-01-20 LAB
FERRITIN SERPL-MCNC: 356 NG/ML (ref 20–300)
IRON SERPL-MCNC: 87 UG/DL (ref 30–160)
SATURATED IRON: 26 % (ref 20–50)
TOTAL IRON BINDING CAPACITY: 330 UG/DL (ref 250–450)
TRANSFERRIN SERPL-MCNC: 223 MG/DL (ref 200–375)

## 2021-01-21 ENCOUNTER — TELEPHONE (OUTPATIENT)
Dept: HEMATOLOGY/ONCOLOGY | Facility: CLINIC | Age: 86
End: 2021-01-21

## 2021-02-03 ENCOUNTER — TELEPHONE (OUTPATIENT)
Dept: VASCULAR SURGERY | Facility: CLINIC | Age: 86
End: 2021-02-03

## 2021-03-18 DIAGNOSIS — I73.9 PVD (PERIPHERAL VASCULAR DISEASE): ICD-10-CM

## 2021-03-18 DIAGNOSIS — E21.3 HYPERPARATHYROIDISM: Primary | ICD-10-CM

## 2021-03-18 DIAGNOSIS — C54.1 ENDOMETRIAL CANCER: ICD-10-CM

## 2021-03-29 ENCOUNTER — OUTPATIENT CASE MANAGEMENT (OUTPATIENT)
Dept: ADMINISTRATIVE | Facility: OTHER | Age: 86
End: 2021-03-29

## 2021-04-08 ENCOUNTER — TELEPHONE (OUTPATIENT)
Dept: HEMATOLOGY/ONCOLOGY | Facility: CLINIC | Age: 86
End: 2021-04-08

## 2021-04-29 ENCOUNTER — LAB VISIT (OUTPATIENT)
Dept: LAB | Facility: HOSPITAL | Age: 86
End: 2021-04-29
Attending: INTERNAL MEDICINE
Payer: MEDICARE

## 2021-04-29 DIAGNOSIS — D50.0 IRON DEFICIENCY ANEMIA DUE TO CHRONIC BLOOD LOSS: ICD-10-CM

## 2021-04-29 LAB
BASOPHILS # BLD AUTO: 0.03 K/UL (ref 0–0.2)
BASOPHILS NFR BLD: 1 % (ref 0–1.9)
DIFFERENTIAL METHOD: ABNORMAL
EOSINOPHIL # BLD AUTO: 0.3 K/UL (ref 0–0.5)
EOSINOPHIL NFR BLD: 10.7 % (ref 0–8)
ERYTHROCYTE [DISTWIDTH] IN BLOOD BY AUTOMATED COUNT: 13 % (ref 11.5–14.5)
FERRITIN SERPL-MCNC: 69 NG/ML (ref 20–300)
HCT VFR BLD AUTO: 35.4 % (ref 37–48.5)
HGB BLD-MCNC: 11.4 G/DL (ref 12–16)
IMM GRANULOCYTES # BLD AUTO: 0.01 K/UL (ref 0–0.04)
IMM GRANULOCYTES NFR BLD AUTO: 0.3 % (ref 0–0.5)
IRON SERPL-MCNC: 56 UG/DL (ref 30–160)
LYMPHOCYTES # BLD AUTO: 0.8 K/UL (ref 1–4.8)
LYMPHOCYTES NFR BLD: 25.1 % (ref 18–48)
MCH RBC QN AUTO: 32.2 PG (ref 27–31)
MCHC RBC AUTO-ENTMCNC: 32.2 G/DL (ref 32–36)
MCV RBC AUTO: 100 FL (ref 82–98)
MONOCYTES # BLD AUTO: 0.3 K/UL (ref 0.3–1)
MONOCYTES NFR BLD: 9 % (ref 4–15)
NEUTROPHILS # BLD AUTO: 1.6 K/UL (ref 1.8–7.7)
NEUTROPHILS NFR BLD: 53.9 % (ref 38–73)
NRBC BLD-RTO: 0 /100 WBC
PLATELET # BLD AUTO: 263 K/UL (ref 150–450)
PMV BLD AUTO: 9 FL (ref 9.2–12.9)
RBC # BLD AUTO: 3.54 M/UL (ref 4–5.4)
SATURATED IRON: 17 % (ref 20–50)
TOTAL IRON BINDING CAPACITY: 339 UG/DL (ref 250–450)
TRANSFERRIN SERPL-MCNC: 229 MG/DL (ref 200–375)
WBC # BLD AUTO: 2.99 K/UL (ref 3.9–12.7)

## 2021-04-29 PROCEDURE — 36415 COLL VENOUS BLD VENIPUNCTURE: CPT | Mod: PO | Performed by: INTERNAL MEDICINE

## 2021-04-29 PROCEDURE — 83540 ASSAY OF IRON: CPT | Performed by: INTERNAL MEDICINE

## 2021-04-29 PROCEDURE — 82728 ASSAY OF FERRITIN: CPT | Performed by: INTERNAL MEDICINE

## 2021-04-29 PROCEDURE — 85025 COMPLETE CBC W/AUTO DIFF WBC: CPT | Mod: PO | Performed by: INTERNAL MEDICINE

## 2021-05-04 ENCOUNTER — OFFICE VISIT (OUTPATIENT)
Dept: INTERNAL MEDICINE | Facility: CLINIC | Age: 86
End: 2021-05-04
Payer: MEDICARE

## 2021-05-04 VITALS
BODY MASS INDEX: 18.3 KG/M2 | HEART RATE: 86 BPM | OXYGEN SATURATION: 98 % | SYSTOLIC BLOOD PRESSURE: 128 MMHG | TEMPERATURE: 99 F | DIASTOLIC BLOOD PRESSURE: 60 MMHG | HEIGHT: 65 IN | RESPIRATION RATE: 17 BRPM

## 2021-05-04 DIAGNOSIS — N18.31 STAGE 3A CHRONIC KIDNEY DISEASE: ICD-10-CM

## 2021-05-04 DIAGNOSIS — J44.9 CHRONIC OBSTRUCTIVE PULMONARY DISEASE, UNSPECIFIED COPD TYPE: ICD-10-CM

## 2021-05-04 DIAGNOSIS — Z89.611 HISTORY OF ABOVE-KNEE AMPUTATION OF RIGHT LOWER EXTREMITY: Primary | ICD-10-CM

## 2021-05-04 DIAGNOSIS — F03.90 DEMENTIA WITHOUT BEHAVIORAL DISTURBANCE, UNSPECIFIED DEMENTIA TYPE: ICD-10-CM

## 2021-05-04 PROCEDURE — 99214 OFFICE O/P EST MOD 30 MIN: CPT | Mod: S$PBB,,, | Performed by: FAMILY MEDICINE

## 2021-05-04 PROCEDURE — 99214 PR OFFICE/OUTPT VISIT, EST, LEVL IV, 30-39 MIN: ICD-10-PCS | Mod: S$PBB,,, | Performed by: FAMILY MEDICINE

## 2021-05-04 PROCEDURE — 99999 PR PBB SHADOW E&M-EST. PATIENT-LVL III: CPT | Mod: PBBFAC,,, | Performed by: FAMILY MEDICINE

## 2021-05-04 PROCEDURE — 99213 OFFICE O/P EST LOW 20 MIN: CPT | Mod: PBBFAC,PO | Performed by: FAMILY MEDICINE

## 2021-05-04 PROCEDURE — 99999 PR PBB SHADOW E&M-EST. PATIENT-LVL III: ICD-10-PCS | Mod: PBBFAC,,, | Performed by: FAMILY MEDICINE

## 2021-05-04 RX ORDER — ATORVASTATIN CALCIUM 20 MG/1
20 TABLET, FILM COATED ORAL NIGHTLY
COMMUNITY
Start: 2021-03-31 | End: 2021-06-28 | Stop reason: SDUPTHER

## 2021-05-04 RX ORDER — GABAPENTIN 300 MG/1
300 CAPSULE ORAL NIGHTLY
COMMUNITY
Start: 2021-03-31 | End: 2021-06-28 | Stop reason: SDUPTHER

## 2021-05-04 RX ORDER — CARVEDILOL 3.12 MG/1
3.12 TABLET ORAL 2 TIMES DAILY
COMMUNITY
Start: 2021-03-31 | End: 2021-06-28 | Stop reason: SDUPTHER

## 2021-05-10 PROBLEM — J44.9 CHRONIC OBSTRUCTIVE PULMONARY DISEASE: Status: ACTIVE | Noted: 2021-05-10

## 2021-05-10 PROBLEM — R93.89 THICKENED ENDOMETRIUM: Status: RESOLVED | Noted: 2019-10-25 | Resolved: 2021-05-10

## 2021-05-10 PROBLEM — N18.31 STAGE 3A CHRONIC KIDNEY DISEASE: Status: ACTIVE | Noted: 2021-05-10

## 2021-05-10 PROBLEM — F03.90 DEMENTIA WITHOUT BEHAVIORAL DISTURBANCE: Status: ACTIVE | Noted: 2021-05-10

## 2021-05-10 PROBLEM — Z89.611: Status: ACTIVE | Noted: 2021-05-10

## 2021-05-28 ENCOUNTER — TELEPHONE (OUTPATIENT)
Dept: GYNECOLOGIC ONCOLOGY | Facility: CLINIC | Age: 86
End: 2021-05-28

## 2021-05-31 ENCOUNTER — OFFICE VISIT (OUTPATIENT)
Dept: HEMATOLOGY/ONCOLOGY | Facility: CLINIC | Age: 86
End: 2021-05-31
Payer: MEDICARE

## 2021-05-31 VITALS
SYSTOLIC BLOOD PRESSURE: 163 MMHG | OXYGEN SATURATION: 97 % | TEMPERATURE: 98 F | HEART RATE: 74 BPM | DIASTOLIC BLOOD PRESSURE: 73 MMHG

## 2021-05-31 DIAGNOSIS — D50.0 IRON DEFICIENCY ANEMIA DUE TO CHRONIC BLOOD LOSS: Primary | ICD-10-CM

## 2021-05-31 DIAGNOSIS — C54.1 ENDOMETRIAL CANCER: ICD-10-CM

## 2021-05-31 PROCEDURE — 99999 PR PBB SHADOW E&M-EST. PATIENT-LVL III: ICD-10-PCS | Mod: PBBFAC,,, | Performed by: INTERNAL MEDICINE

## 2021-05-31 PROCEDURE — 99213 OFFICE O/P EST LOW 20 MIN: CPT | Mod: PBBFAC | Performed by: INTERNAL MEDICINE

## 2021-05-31 PROCEDURE — 99214 OFFICE O/P EST MOD 30 MIN: CPT | Mod: S$PBB,,, | Performed by: INTERNAL MEDICINE

## 2021-05-31 PROCEDURE — 99999 PR PBB SHADOW E&M-EST. PATIENT-LVL III: CPT | Mod: PBBFAC,,, | Performed by: INTERNAL MEDICINE

## 2021-05-31 PROCEDURE — 99214 PR OFFICE/OUTPT VISIT, EST, LEVL IV, 30-39 MIN: ICD-10-PCS | Mod: S$PBB,,, | Performed by: INTERNAL MEDICINE

## 2021-06-09 DIAGNOSIS — Z79.811 LONG TERM (CURRENT) USE OF AROMATASE INHIBITORS: ICD-10-CM

## 2021-06-09 DIAGNOSIS — C54.1 ENDOMETRIAL CANCER: Primary | ICD-10-CM

## 2021-06-10 ENCOUNTER — TELEPHONE (OUTPATIENT)
Dept: HEMATOLOGY/ONCOLOGY | Facility: CLINIC | Age: 86
End: 2021-06-10

## 2021-06-28 RX ORDER — GABAPENTIN 300 MG/1
300 CAPSULE ORAL NIGHTLY
Qty: 90 CAPSULE | Refills: 0 | Status: SHIPPED | OUTPATIENT
Start: 2021-06-28 | End: 2021-09-29

## 2021-06-28 RX ORDER — ATORVASTATIN CALCIUM 20 MG/1
20 TABLET, FILM COATED ORAL NIGHTLY
Qty: 90 TABLET | Refills: 0 | Status: SHIPPED | OUTPATIENT
Start: 2021-06-28 | End: 2021-09-20

## 2021-06-28 RX ORDER — CARVEDILOL 3.12 MG/1
3.12 TABLET ORAL 2 TIMES DAILY
Qty: 90 TABLET | Refills: 0 | Status: SHIPPED | OUTPATIENT
Start: 2021-06-28 | End: 2021-08-05

## 2021-07-14 ENCOUNTER — HOSPITAL ENCOUNTER (EMERGENCY)
Facility: HOSPITAL | Age: 86
Discharge: HOME | End: 2021-07-14
Attending: EMERGENCY MEDICINE
Payer: MEDICARE

## 2021-07-14 VITALS
RESPIRATION RATE: 19 BRPM | WEIGHT: 110 LBS | HEART RATE: 70 BPM | TEMPERATURE: 98 F | DIASTOLIC BLOOD PRESSURE: 77 MMHG | BODY MASS INDEX: 18.3 KG/M2 | SYSTOLIC BLOOD PRESSURE: 186 MMHG | OXYGEN SATURATION: 99 %

## 2021-07-14 DIAGNOSIS — M79.609 STUMP PAIN: ICD-10-CM

## 2021-07-14 DIAGNOSIS — T87.89 STUMP PAIN: ICD-10-CM

## 2021-07-14 DIAGNOSIS — M89.8X9 BONE PAIN: ICD-10-CM

## 2021-07-14 PROCEDURE — 99283 EMERGENCY DEPT VISIT LOW MDM: CPT | Mod: ER

## 2021-07-14 RX ORDER — HYDROCODONE BITARTRATE AND ACETAMINOPHEN 7.5; 325 MG/1; MG/1
1 TABLET ORAL EVERY 6 HOURS PRN
Qty: 11 TABLET | Refills: 0 | Status: SHIPPED | OUTPATIENT
Start: 2021-07-14 | End: 2021-07-17

## 2021-08-06 ENCOUNTER — TELEPHONE (OUTPATIENT)
Dept: HEMATOLOGY/ONCOLOGY | Facility: CLINIC | Age: 86
End: 2021-08-06

## 2021-08-09 ENCOUNTER — TELEPHONE (OUTPATIENT)
Dept: HEMATOLOGY/ONCOLOGY | Facility: CLINIC | Age: 86
End: 2021-08-09

## 2021-10-05 ENCOUNTER — LAB VISIT (OUTPATIENT)
Dept: LAB | Facility: HOSPITAL | Age: 86
End: 2021-10-05
Attending: INTERNAL MEDICINE
Payer: MEDICARE

## 2021-10-05 DIAGNOSIS — D50.0 IRON DEFICIENCY ANEMIA DUE TO CHRONIC BLOOD LOSS: ICD-10-CM

## 2021-10-05 LAB
BASOPHILS # BLD AUTO: 0.03 K/UL (ref 0–0.2)
BASOPHILS NFR BLD: 0.7 % (ref 0–1.9)
DIFFERENTIAL METHOD: ABNORMAL
EOSINOPHIL # BLD AUTO: 0.1 K/UL (ref 0–0.5)
EOSINOPHIL NFR BLD: 2.5 % (ref 0–8)
ERYTHROCYTE [DISTWIDTH] IN BLOOD BY AUTOMATED COUNT: 13.9 % (ref 11.5–14.5)
FERRITIN SERPL-MCNC: 72 NG/ML (ref 20–300)
HCT VFR BLD AUTO: 39.4 % (ref 37–48.5)
HGB BLD-MCNC: 12.2 G/DL (ref 12–16)
IMM GRANULOCYTES # BLD AUTO: 0.01 K/UL (ref 0–0.04)
IMM GRANULOCYTES NFR BLD AUTO: 0.2 % (ref 0–0.5)
IRON SERPL-MCNC: 86 UG/DL (ref 30–160)
LYMPHOCYTES # BLD AUTO: 1 K/UL (ref 1–4.8)
LYMPHOCYTES NFR BLD: 24.7 % (ref 18–48)
MCH RBC QN AUTO: 30.8 PG (ref 27–31)
MCHC RBC AUTO-ENTMCNC: 31 G/DL (ref 32–36)
MCV RBC AUTO: 100 FL (ref 82–98)
MONOCYTES # BLD AUTO: 0.4 K/UL (ref 0.3–1)
MONOCYTES NFR BLD: 8.9 % (ref 4–15)
NEUTROPHILS # BLD AUTO: 2.6 K/UL (ref 1.8–7.7)
NEUTROPHILS NFR BLD: 63 % (ref 38–73)
NRBC BLD-RTO: 0 /100 WBC
PLATELET # BLD AUTO: 228 K/UL (ref 150–450)
PMV BLD AUTO: 8.9 FL (ref 9.2–12.9)
RBC # BLD AUTO: 3.96 M/UL (ref 4–5.4)
SATURATED IRON: 23 % (ref 20–50)
TOTAL IRON BINDING CAPACITY: 369 UG/DL (ref 250–450)
TRANSFERRIN SERPL-MCNC: 249 MG/DL (ref 200–375)
WBC # BLD AUTO: 4.05 K/UL (ref 3.9–12.7)

## 2021-10-05 PROCEDURE — 82728 ASSAY OF FERRITIN: CPT | Performed by: INTERNAL MEDICINE

## 2021-10-05 PROCEDURE — 84466 ASSAY OF TRANSFERRIN: CPT | Performed by: INTERNAL MEDICINE

## 2021-10-05 PROCEDURE — 85025 COMPLETE CBC W/AUTO DIFF WBC: CPT | Mod: PO | Performed by: INTERNAL MEDICINE

## 2021-10-05 PROCEDURE — 36415 COLL VENOUS BLD VENIPUNCTURE: CPT | Mod: PO | Performed by: INTERNAL MEDICINE

## 2021-10-14 ENCOUNTER — OFFICE VISIT (OUTPATIENT)
Dept: HEMATOLOGY/ONCOLOGY | Facility: CLINIC | Age: 86
End: 2021-10-14
Payer: MEDICARE

## 2021-10-14 VITALS
TEMPERATURE: 98 F | DIASTOLIC BLOOD PRESSURE: 62 MMHG | HEART RATE: 69 BPM | OXYGEN SATURATION: 98 % | SYSTOLIC BLOOD PRESSURE: 154 MMHG

## 2021-10-14 DIAGNOSIS — C54.1 ENDOMETRIAL CANCER: Primary | ICD-10-CM

## 2021-10-14 DIAGNOSIS — D50.0 IRON DEFICIENCY ANEMIA DUE TO CHRONIC BLOOD LOSS: ICD-10-CM

## 2021-10-14 PROCEDURE — 99214 PR OFFICE/OUTPT VISIT, EST, LEVL IV, 30-39 MIN: ICD-10-PCS | Mod: S$PBB,,, | Performed by: INTERNAL MEDICINE

## 2021-10-14 PROCEDURE — 99213 OFFICE O/P EST LOW 20 MIN: CPT | Mod: PBBFAC | Performed by: INTERNAL MEDICINE

## 2021-10-14 PROCEDURE — 99214 OFFICE O/P EST MOD 30 MIN: CPT | Mod: S$PBB,,, | Performed by: INTERNAL MEDICINE

## 2021-10-14 PROCEDURE — 99999 PR PBB SHADOW E&M-EST. PATIENT-LVL III: CPT | Mod: PBBFAC,,, | Performed by: INTERNAL MEDICINE

## 2021-10-14 PROCEDURE — 99999 PR PBB SHADOW E&M-EST. PATIENT-LVL III: ICD-10-PCS | Mod: PBBFAC,,, | Performed by: INTERNAL MEDICINE

## 2021-10-18 ENCOUNTER — APPOINTMENT (OUTPATIENT)
Dept: RADIOLOGY | Facility: HOSPITAL | Age: 86
End: 2021-10-18
Attending: INTERNAL MEDICINE
Payer: MEDICARE

## 2021-10-18 DIAGNOSIS — Z79.811 LONG TERM (CURRENT) USE OF AROMATASE INHIBITORS: ICD-10-CM

## 2021-10-18 DIAGNOSIS — C54.1 ENDOMETRIAL CANCER: ICD-10-CM

## 2021-10-18 PROCEDURE — 77080 DXA BONE DENSITY AXIAL: CPT | Mod: TC

## 2021-10-18 PROCEDURE — 77080 DXA BONE DENSITY AXIAL: CPT | Mod: 26,,, | Performed by: RADIOLOGY

## 2021-10-18 PROCEDURE — 77080 DEXA BONE DENSITY SPINE HIP: ICD-10-PCS | Mod: 26,,, | Performed by: RADIOLOGY

## 2021-11-03 ENCOUNTER — OFFICE VISIT (OUTPATIENT)
Dept: INTERNAL MEDICINE | Facility: CLINIC | Age: 86
End: 2021-11-03
Payer: MEDICARE

## 2021-11-03 VITALS
SYSTOLIC BLOOD PRESSURE: 136 MMHG | TEMPERATURE: 99 F | OXYGEN SATURATION: 98 % | BODY MASS INDEX: 18.3 KG/M2 | DIASTOLIC BLOOD PRESSURE: 72 MMHG | HEART RATE: 70 BPM | HEIGHT: 65 IN

## 2021-11-03 DIAGNOSIS — F32.9 REACTIVE DEPRESSION: Primary | ICD-10-CM

## 2021-11-03 DIAGNOSIS — D50.0 IRON DEFICIENCY ANEMIA DUE TO CHRONIC BLOOD LOSS: ICD-10-CM

## 2021-11-03 DIAGNOSIS — J44.9 CHRONIC OBSTRUCTIVE PULMONARY DISEASE, UNSPECIFIED COPD TYPE: ICD-10-CM

## 2021-11-03 DIAGNOSIS — N18.31 STAGE 3A CHRONIC KIDNEY DISEASE: ICD-10-CM

## 2021-11-03 DIAGNOSIS — I10 PRIMARY HYPERTENSION: ICD-10-CM

## 2021-11-03 DIAGNOSIS — F51.05 INSOMNIA DUE TO OTHER MENTAL DISORDER: ICD-10-CM

## 2021-11-03 DIAGNOSIS — F99 INSOMNIA DUE TO OTHER MENTAL DISORDER: ICD-10-CM

## 2021-11-03 PROCEDURE — 99999 PR PBB SHADOW E&M-EST. PATIENT-LVL IV: CPT | Mod: PBBFAC,,, | Performed by: NURSE PRACTITIONER

## 2021-11-03 PROCEDURE — 99999 PR PBB SHADOW E&M-EST. PATIENT-LVL IV: ICD-10-PCS | Mod: PBBFAC,,, | Performed by: NURSE PRACTITIONER

## 2021-11-03 PROCEDURE — 99214 OFFICE O/P EST MOD 30 MIN: CPT | Mod: S$PBB,,, | Performed by: NURSE PRACTITIONER

## 2021-11-03 PROCEDURE — 99214 OFFICE O/P EST MOD 30 MIN: CPT | Mod: PBBFAC,PO | Performed by: NURSE PRACTITIONER

## 2021-11-03 PROCEDURE — 99214 PR OFFICE/OUTPT VISIT, EST, LEVL IV, 30-39 MIN: ICD-10-PCS | Mod: S$PBB,,, | Performed by: NURSE PRACTITIONER

## 2021-11-03 PROCEDURE — 90694 VACC AIIV4 NO PRSRV 0.5ML IM: CPT | Mod: PBBFAC,PO

## 2021-11-03 PROCEDURE — G0008 ADMIN INFLUENZA VIRUS VAC: HCPCS | Mod: PBBFAC,PO

## 2021-11-03 RX ORDER — TRAZODONE HYDROCHLORIDE 50 MG/1
25 TABLET ORAL NIGHTLY PRN
Qty: 30 TABLET | Refills: 0 | Status: SHIPPED | OUTPATIENT
Start: 2021-11-03 | End: 2021-11-28

## 2021-11-04 ENCOUNTER — OUTPATIENT CASE MANAGEMENT (OUTPATIENT)
Dept: ADMINISTRATIVE | Facility: OTHER | Age: 86
End: 2021-11-04
Payer: MEDICAID

## 2021-11-11 ENCOUNTER — OFFICE VISIT (OUTPATIENT)
Dept: HEMATOLOGY/ONCOLOGY | Facility: CLINIC | Age: 86
End: 2021-11-11
Payer: MEDICARE

## 2021-11-11 VITALS
SYSTOLIC BLOOD PRESSURE: 163 MMHG | TEMPERATURE: 98 F | HEART RATE: 98 BPM | DIASTOLIC BLOOD PRESSURE: 66 MMHG | OXYGEN SATURATION: 98 %

## 2021-11-11 DIAGNOSIS — C54.1 ENDOMETRIAL CANCER: Primary | ICD-10-CM

## 2021-11-11 DIAGNOSIS — Z79.811 LONG TERM (CURRENT) USE OF AROMATASE INHIBITORS: ICD-10-CM

## 2021-11-11 DIAGNOSIS — M81.0 OSTEOPOROSIS, UNSPECIFIED OSTEOPOROSIS TYPE, UNSPECIFIED PATHOLOGICAL FRACTURE PRESENCE: ICD-10-CM

## 2021-11-11 PROCEDURE — 99999 PR PBB SHADOW E&M-EST. PATIENT-LVL III: ICD-10-PCS | Mod: PBBFAC,,, | Performed by: INTERNAL MEDICINE

## 2021-11-11 PROCEDURE — 99215 PR OFFICE/OUTPT VISIT, EST, LEVL V, 40-54 MIN: ICD-10-PCS | Mod: S$PBB,,, | Performed by: INTERNAL MEDICINE

## 2021-11-11 PROCEDURE — 99999 PR PBB SHADOW E&M-EST. PATIENT-LVL III: CPT | Mod: PBBFAC,,, | Performed by: INTERNAL MEDICINE

## 2021-11-11 PROCEDURE — 99213 OFFICE O/P EST LOW 20 MIN: CPT | Mod: PBBFAC | Performed by: INTERNAL MEDICINE

## 2021-11-11 PROCEDURE — 99215 OFFICE O/P EST HI 40 MIN: CPT | Mod: S$PBB,,, | Performed by: INTERNAL MEDICINE

## 2021-12-30 ENCOUNTER — IMMUNIZATION (OUTPATIENT)
Dept: PRIMARY CARE CLINIC | Facility: CLINIC | Age: 86
End: 2021-12-30
Payer: MEDICARE

## 2021-12-30 DIAGNOSIS — Z23 NEED FOR VACCINATION: Primary | ICD-10-CM

## 2021-12-30 PROCEDURE — 0001A COVID-19, MRNA, LNP-S, PF, 30 MCG/0.3 ML DOSE VACCINE: CPT | Mod: CV19,PBBFAC | Performed by: FAMILY MEDICINE

## 2022-02-02 DIAGNOSIS — I10 HYPERTENSION: ICD-10-CM

## 2022-03-12 ENCOUNTER — HOSPITAL ENCOUNTER (EMERGENCY)
Facility: HOSPITAL | Age: 87
Discharge: HOME | End: 2022-03-13
Attending: EMERGENCY MEDICINE
Payer: MEDICARE

## 2022-03-12 DIAGNOSIS — M79.651 RIGHT THIGH PAIN: Primary | ICD-10-CM

## 2022-03-12 DIAGNOSIS — I10 HYPERTENSION, UNSPECIFIED TYPE: ICD-10-CM

## 2022-03-12 DIAGNOSIS — M25.511 SHOULDER PAIN, RIGHT: ICD-10-CM

## 2022-03-12 PROCEDURE — 99284 EMERGENCY DEPT VISIT MOD MDM: CPT | Mod: ER

## 2022-03-13 VITALS
OXYGEN SATURATION: 100 % | SYSTOLIC BLOOD PRESSURE: 198 MMHG | HEART RATE: 63 BPM | BODY MASS INDEX: 18.3 KG/M2 | TEMPERATURE: 98 F | RESPIRATION RATE: 20 BRPM | DIASTOLIC BLOOD PRESSURE: 83 MMHG | WEIGHT: 110 LBS

## 2022-03-13 PROCEDURE — 25000003 PHARM REV CODE 250: Mod: ER | Performed by: EMERGENCY MEDICINE

## 2022-03-13 RX ORDER — HYDROCODONE BITARTRATE AND ACETAMINOPHEN 10; 325 MG/1; MG/1
1 TABLET ORAL
Status: COMPLETED | OUTPATIENT
Start: 2022-03-13 | End: 2022-03-13

## 2022-03-13 RX ORDER — HYDROCODONE BITARTRATE AND ACETAMINOPHEN 5; 325 MG/1; MG/1
1 TABLET ORAL EVERY 4 HOURS PRN
Qty: 10 TABLET | Refills: 0 | Status: SHIPPED | OUTPATIENT
Start: 2022-03-13 | End: 2022-04-07 | Stop reason: SDUPTHER

## 2022-03-13 RX ADMIN — HYDROCODONE BITARTRATE AND ACETAMINOPHEN 1 TABLET: 10; 325 TABLET ORAL at 12:03

## 2022-03-13 NOTE — ED PROVIDER NOTES
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 States shooting pains in leg.     Shoulder Pain     C/o "shooting" shoulder pain. Denies falling. States took Tylenol tonight w/o relief.      The history is provided by the patient.   Leg Pain   The incident occurred at home. There was no injury mechanism. The incident occurred several days ago. The pain is present in the right thigh (right shoulder). The quality of the pain is described as aching. The pain is at a severity of 5/10. The pain has been constant since onset. Pertinent negatives include no numbness, no inability to bear weight, no loss of motion, no muscle weakness, no loss of sensation and no tingling. She reports no foreign bodies present. The symptoms are aggravated by palpation.   Pt/Fmy deny recent trauma, No falls, No fever, No CP/SOB.    Review of patient's allergies indicates:   Allergen Reactions    Pregabalin Swelling    Tramadol Hallucinations     Past Medical History:   Diagnosis Date    Arthritis     Cancer     Hypertension     Hypertension 10/25/2019    Myocardial infarction     Peripheral vascular disease 10/25/2019    Stroke     TIA    Thickened endometrium 10/25/2019     Past Surgical History:   Procedure Laterality Date    BKA Left     CARDIAC SURGERY      heart cath     SECTION      x 1    HYSTEROSCOPY WITH DILATION AND CURETTAGE OF UTERUS N/A 2019    Procedure: HYSTEROSCOPY, WITH DILATION AND CURETTAGE OF UTERUS;  Surgeon: Ximena Thorne MD;  Location: [facility];  Service: OB/GYN;  Laterality: N/A;    LEG AMPUTATION      left  - right      Family History   Problem Relation Age of Onset    Hypertension Sister     Stroke Sister     Hypertension Brother     Cancer Sister     Heart disease Brother     Heart attack Brother     Diabetes Brother     Stroke Brother     Heart " disease Brother     Diabetes Sister     Stroke Sister     Heart disease Sister      Social History     Tobacco Use    Smoking status: Former Smoker     Types: Cigarettes    Smokeless tobacco: Never Used   Substance Use Topics    Alcohol use: Not Currently    Drug use: Not Currently     Review of Systems   Musculoskeletal:        Right thigh, right shoulder pain   Neurological: Negative for tingling and numbness.   All other systems reviewed and are negative.      Physical Exam     Initial Vitals [03/12/22 2344]   BP Pulse Resp Temp SpO2   (!) 224/107 71 18 97.7 °F (36.5 °C) 96 %      MAP       --         Physical Exam    Nursing note and vitals reviewed.  Constitutional: She appears well-developed and well-nourished. No distress.   Elderly frail   HENT:   Head: Normocephalic and atraumatic.   Mouth/Throat: Oropharynx is clear and moist.   Eyes: Conjunctivae and EOM are normal. Pupils are equal, round, and reactive to light.   Neck: Neck supple.   Normal range of motion.  Cardiovascular: Normal rate, regular rhythm and normal heart sounds.   Pulmonary/Chest: Breath sounds normal. No respiratory distress.   Abdominal: Abdomen is soft. Bowel sounds are normal. She exhibits no distension. There is no abdominal tenderness.   Musculoskeletal:         General: Normal range of motion.      Right shoulder: Tenderness present. No swelling, deformity or bony tenderness. Normal range of motion.      Left shoulder: Normal.      Cervical back: Normal range of motion and neck supple.      Comments: Right AKA: No erythema No warmth No ecchymoses No edema No swelling NT palpation  Left BKA: wnl     Neurological: She is alert and oriented to person, place, and time. She has normal strength.   Skin: Skin is warm and dry.   Psychiatric: She has a normal mood and affect. Thought content normal.         ED Course   Procedures  Labs Reviewed - No data to display       Imaging Results          X-Ray Shoulder Trauma Right  (Preliminary result)  Result time 03/13/22 00:40:33    ED Interpretation by Reynaldo Bravo MD (03/13/22 00:40:33, [facility] Emergency Dept, Emergency Medicine)    NAF Neg fx Neg dislocation                             X-Ray Femur Ap/Lat Right (Preliminary result)  Result time 03/13/22 00:42:37    ED Interpretation by Reynaldo Bravo MD (03/13/22 00:42:37, [facility] Emergency Dept, Emergency Medicine)    No acute findings                          12:44 AM - Counseling: Spoke with the patient and discussed todays findings, in addition to providing specific details for the plan of care and counseling regarding the diagnosis and prognosis. Questions are answered at this time.    Pre-hypertension/Hypertension: The pt has been informed that they may have pre-hypertension or hypertension based on a blood pressure reading in the ED. I recommend that the pt call the PCP listed on their discharge instructions or a physician of their choice this week to arrange f/u for further evaluation of possible pre-hypertension or hypertension.       Medications   HYDROcodone-acetaminophen  mg per tablet 1 tablet (1 tablet Oral Given 3/13/22 0006)                          Clinical Impression:   Final diagnoses:  [M79.651] Right thigh pain (Primary)  [M25.511] Shoulder pain, right  [I10] Hypertension, unspecified type          ED Disposition Condition    Discharge Stable        ED Prescriptions     Medication Sig Dispense Start Date End Date Auth. Provider    HYDROcodone-acetaminophen (NORCO) 5-325 mg per tablet Take 1 tablet by mouth every 4 (four) hours as needed. 10 tablet 3/13/2022  Reynaldo Bravo MD        Follow-up Information     Follow up With Specialties Details Why Contact Info    Curtis Samaniego DO Family Medicine Call in 3 days As needed [id number] [address] [address]  [phone]      Orthopedics  Call in 1 week As needed     [facility] Emergency Dept Emergency Medicine  If  symptoms worsen [id number] [facility] [address]  [place] [id number] [phone]           Reynaldo Bravo MD  03/13/22 0045

## 2022-03-17 ENCOUNTER — PES CALL (OUTPATIENT)
Dept: ADMINISTRATIVE | Facility: CLINIC | Age: 87
End: 2022-03-17
Payer: MEDICARE

## 2022-03-27 ENCOUNTER — HOSPITAL ENCOUNTER (INPATIENT)
Facility: HOSPITAL | Age: 87
LOS: 7 days | Discharge: HOME HEALTH | DRG: 534 | End: 2022-04-04
Attending: EMERGENCY MEDICINE | Admitting: EMERGENCY MEDICINE
Payer: MEDICARE

## 2022-03-27 DIAGNOSIS — N18.31 STAGE 3A CHRONIC KIDNEY DISEASE: ICD-10-CM

## 2022-03-27 DIAGNOSIS — S72.402D CLOSED FRACTURE OF DISTAL END OF LEFT FEMUR WITH ROUTINE HEALING, UNSPECIFIED FRACTURE MORPHOLOGY, SUBSEQUENT ENCOUNTER: ICD-10-CM

## 2022-03-27 DIAGNOSIS — Z01.818 PREOP EXAM FOR INTERNAL MEDICINE: ICD-10-CM
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
## 2022-03-27 DIAGNOSIS — S72.92XA CLOSED FRACTURE OF LEFT FEMUR, UNSPECIFIED FRACTURE MORPHOLOGY, UNSPECIFIED PORTION OF FEMUR, INITIAL ENCOUNTER: Primary | ICD-10-CM

## 2022-03-27 DIAGNOSIS — M79.605 LEFT LEG PAIN: ICD-10-CM

## 2022-03-27 DIAGNOSIS — F03.90 DEMENTIA WITHOUT BEHAVIORAL DISTURBANCE, UNSPECIFIED DEMENTIA TYPE: Chronic | ICD-10-CM

## 2022-03-27 PROCEDURE — 99285 EMERGENCY DEPT VISIT HI MDM: CPT | Mod: 25,ER

## 2022-03-27 PROCEDURE — 96374 THER/PROPH/DIAG INJ IV PUSH: CPT | Mod: ER

## 2022-03-27 PROCEDURE — 96372 THER/PROPH/DIAG INJ SC/IM: CPT | Performed by: EMERGENCY MEDICINE

## 2022-03-27 PROCEDURE — 63600175 PHARM REV CODE 636 W HCPCS: Mod: ER | Performed by: EMERGENCY MEDICINE

## 2022-03-27 RX ORDER — MORPHINE SULFATE 4 MG/ML
4 INJECTION, SOLUTION INTRAMUSCULAR; INTRAVENOUS
Status: COMPLETED | OUTPATIENT
Start: 2022-03-27 | End: 2022-03-27

## 2022-03-27 RX ADMIN — MORPHINE SULFATE 4 MG: 4 INJECTION INTRAVENOUS at 11:03

## 2022-03-28 PROBLEM — E78.5 HYPERLIPIDEMIA: Chronic | Status: ACTIVE | Noted: 2022-03-28

## 2022-03-28 PROBLEM — S72.92XA CLOSED FRACTURE OF LEFT FEMUR: Status: ACTIVE | Noted: 2022-03-28

## 2022-03-28 PROBLEM — F03.90 DEMENTIA WITHOUT BEHAVIORAL DISTURBANCE: Chronic | Status: ACTIVE | Noted: 2021-05-10

## 2022-03-28 PROBLEM — J44.9 CHRONIC OBSTRUCTIVE PULMONARY DISEASE: Chronic | Status: ACTIVE | Noted: 2021-05-10

## 2022-03-28 PROBLEM — I10 HYPERTENSION: Chronic | Status: ACTIVE | Noted: 2019-10-25

## 2022-03-28 PROBLEM — S72.402A CLOSED FRACTURE OF DISTAL END OF LEFT FEMUR: Status: ACTIVE | Noted: 2022-03-28

## 2022-03-28 LAB
ALBUMIN SERPL BCP-MCNC: 3.5 G/DL (ref 3.5–5.2)
ALP SERPL-CCNC: 95 U/L (ref 55–135)
ALT SERPL W/O P-5'-P-CCNC: 16 U/L (ref 10–44)
ANION GAP SERPL CALC-SCNC: 9 MMOL/L (ref 8–16)
ANION GAP SERPL CALC-SCNC: 9 MMOL/L (ref 8–16)
AORTIC ROOT ANNULUS: 2.96 CM
AORTIC VALVE CUSP SEPERATION: 0 CM
APTT BLDCRRT: 26 SEC (ref 21–32)
ASCENDING AORTA: 3.11 CM
AST SERPL-CCNC: 18 U/L (ref 10–40)
AV INDEX (PROSTH): 0.99
AV MEAN GRADIENT: 4 MMHG
AV PEAK GRADIENT: 6 MMHG
AV VALVE AREA: 2.46 CM2
AV VELOCITY RATIO: 0.72
BASOPHILS # BLD AUTO: 0.03 K/UL (ref 0–0.2)
BASOPHILS # BLD AUTO: 0.03 K/UL (ref 0–0.2)
BASOPHILS NFR BLD: 0.4 % (ref 0–1.9)
BASOPHILS NFR BLD: 0.5 % (ref 0–1.9)
BILIRUB SERPL-MCNC: 0.4 MG/DL (ref 0.1–1)
BSA FOR ECHO PROCEDURE: 1.49 M2
BUN SERPL-MCNC: 11 MG/DL (ref 10–30)
BUN SERPL-MCNC: 12 MG/DL (ref 10–30)
CALCIUM SERPL-MCNC: 9.4 MG/DL (ref 8.7–10.5)
CALCIUM SERPL-MCNC: 9.7 MG/DL (ref 8.7–10.5)
CHLORIDE SERPL-SCNC: 105 MMOL/L (ref 95–110)
CHLORIDE SERPL-SCNC: 105 MMOL/L (ref 95–110)
CO2 SERPL-SCNC: 24 MMOL/L (ref 23–29)
CO2 SERPL-SCNC: 25 MMOL/L (ref 23–29)
CREAT SERPL-MCNC: 0.5 MG/DL (ref 0.5–1.4)
CREAT SERPL-MCNC: 0.6 MG/DL (ref 0.5–1.4)
CTP QC/QA: YES
CV ECHO LV RWT: 0.72 CM
DIFFERENTIAL METHOD: ABNORMAL
DIFFERENTIAL METHOD: ABNORMAL
DOP CALC AO PEAK VEL: 1.23 M/S
DOP CALC AO VTI: 25.4 CM
DOP CALC LVOT AREA: 2.5 CM2
DOP CALC LVOT DIAMETER: 1.78 CM
DOP CALC LVOT PEAK VEL: 0.89 M/S
DOP CALC LVOT STROKE VOLUME: 62.43 CM3
DOP CALC RVOT PEAK VEL: 1 M/S
DOP CALC RVOT VTI: 23.8 CM
DOP CALCLVOT PEAK VEL VTI: 25.1 CM
E WAVE DECELERATION TIME: 233.63 MSEC
E/A RATIO: 0.77
E/E' RATIO: 9.67 M/S
ECHO EF ESTIMATED: 58 %
ECHO LV POSTERIOR WALL: 1.16 CM (ref 0.6–1.1)
EJECTION FRACTION: 55 %
EOSINOPHIL # BLD AUTO: 0.1 K/UL (ref 0–0.5)
EOSINOPHIL # BLD AUTO: 0.1 K/UL (ref 0–0.5)
EOSINOPHIL NFR BLD: 0.9 % (ref 0–8)
EOSINOPHIL NFR BLD: 0.9 % (ref 0–8)
ERYTHROCYTE [DISTWIDTH] IN BLOOD BY AUTOMATED COUNT: 14 % (ref 11.5–14.5)
ERYTHROCYTE [DISTWIDTH] IN BLOOD BY AUTOMATED COUNT: 14.3 % (ref 11.5–14.5)
EST. GFR  (AFRICAN AMERICAN): >60 ML/MIN/1.73 M^2
EST. GFR  (AFRICAN AMERICAN): >60 ML/MIN/1.73 M^2
EST. GFR  (NON AFRICAN AMERICAN): >60 ML/MIN/1.73 M^2
EST. GFR  (NON AFRICAN AMERICAN): >60 ML/MIN/1.73 M^2
FRACTIONAL SHORTENING: 30 % (ref 28–44)
GLUCOSE SERPL-MCNC: 112 MG/DL (ref 70–110)
GLUCOSE SERPL-MCNC: 122 MG/DL (ref 70–110)
HCT VFR BLD AUTO: 33.9 % (ref 37–48.5)
HCT VFR BLD AUTO: 36.9 % (ref 37–48.5)
HGB BLD-MCNC: 10.7 G/DL (ref 12–16)
HGB BLD-MCNC: 11.7 G/DL (ref 12–16)
IMM GRANULOCYTES # BLD AUTO: 0.02 K/UL (ref 0–0.04)
IMM GRANULOCYTES # BLD AUTO: 0.04 K/UL (ref 0–0.04)
IMM GRANULOCYTES NFR BLD AUTO: 0.3 % (ref 0–0.5)
IMM GRANULOCYTES NFR BLD AUTO: 0.5 % (ref 0–0.5)
INR PPP: 1.1 (ref 0.8–1.2)
INTERVENTRICULAR SEPTUM: 1.31 CM (ref 0.6–1.1)
IVC DIAMETER: 1.3 CM
IVRT: 87.66 MSEC
LA MINOR: 3.2 CM
LA WIDTH: 4.36 CM
LEFT ATRIUM SIZE: 2.84 CM
LEFT INTERNAL DIMENSION IN SYSTOLE: 2.27 CM (ref 2.1–4)
LEFT VENTRICLE DIASTOLIC VOLUME INDEX: 27.77 ML/M2
LEFT VENTRICLE DIASTOLIC VOLUME: 41.65 ML
LEFT VENTRICLE MASS INDEX: 84 G/M2
LEFT VENTRICLE SYSTOLIC VOLUME INDEX: 11.7 ML/M2
LEFT VENTRICLE SYSTOLIC VOLUME: 17.53 ML
LEFT VENTRICULAR INTERNAL DIMENSION IN DIASTOLE: 3.22 CM (ref 3.5–6)
LEFT VENTRICULAR MASS: 126.09 G
LV LATERAL E/E' RATIO: 8.29 M/S
LV SEPTAL E/E' RATIO: 11.6 M/S
LVOT MG: 2.22 MMHG
LVOT MV: 0.74 CM/S
LYMPHOCYTES # BLD AUTO: 0.9 K/UL (ref 1–4.8)
LYMPHOCYTES # BLD AUTO: 0.9 K/UL (ref 1–4.8)
LYMPHOCYTES NFR BLD: 11.8 % (ref 18–48)
LYMPHOCYTES NFR BLD: 14.9 % (ref 18–48)
MCH RBC QN AUTO: 31.1 PG (ref 27–31)
MCH RBC QN AUTO: 31.3 PG (ref 27–31)
MCHC RBC AUTO-ENTMCNC: 31.6 G/DL (ref 32–36)
MCHC RBC AUTO-ENTMCNC: 31.7 G/DL (ref 32–36)
MCV RBC AUTO: 99 FL (ref 82–98)
MCV RBC AUTO: 99 FL (ref 82–98)
MONOCYTES # BLD AUTO: 0.3 K/UL (ref 0.3–1)
MONOCYTES # BLD AUTO: 0.4 K/UL (ref 0.3–1)
MONOCYTES NFR BLD: 4.3 % (ref 4–15)
MONOCYTES NFR BLD: 6.4 % (ref 4–15)
MV PEAK A VEL: 0.75 M/S
MV PEAK E VEL: 0.58 M/S
MV STENOSIS PRESSURE HALF TIME: 67.75 MS
MV VALVE AREA P 1/2 METHOD: 3.25 CM2
NEUTROPHILS # BLD AUTO: 4.4 K/UL (ref 1.8–7.7)
NEUTROPHILS # BLD AUTO: 6.5 K/UL (ref 1.8–7.7)
NEUTROPHILS NFR BLD: 77 % (ref 38–73)
NEUTROPHILS NFR BLD: 82.1 % (ref 38–73)
NRBC BLD-RTO: 0 /100 WBC
NRBC BLD-RTO: 0 /100 WBC
PISA MRMAX VEL: 3.53 M/S
PISA TR MAX VEL: 3.45 M/S
PLATELET # BLD AUTO: 216 K/UL (ref 150–450)
PLATELET # BLD AUTO: 232 K/UL (ref 150–450)
PMV BLD AUTO: 9.2 FL (ref 9.2–12.9)
PMV BLD AUTO: 9.6 FL (ref 9.2–12.9)
POTASSIUM SERPL-SCNC: 3.9 MMOL/L (ref 3.5–5.1)
POTASSIUM SERPL-SCNC: 4.3 MMOL/L (ref 3.5–5.1)
PROT SERPL-MCNC: 7.5 G/DL (ref 6–8.4)
PROTHROMBIN TIME: 11.6 SEC (ref 9–12.5)
PV MEAN GRADIENT: 2.67 MMHG
RA MAJOR: 3.98 CM
RA PRESSURE: 3 MMHG
RBC # BLD AUTO: 3.44 M/UL (ref 4–5.4)
RBC # BLD AUTO: 3.74 M/UL (ref 4–5.4)
SARS-COV-2 RDRP RESP QL NAA+PROBE: NEGATIVE
SODIUM SERPL-SCNC: 138 MMOL/L (ref 136–145)
SODIUM SERPL-SCNC: 139 MMOL/L (ref 136–145)
STJ: 3.14 CM
TDI LATERAL: 0.07 M/S
TDI SEPTAL: 0.05 M/S
TDI: 0.06 M/S
TR MAX PG: 48 MMHG
TR MEAN GRADIENT: 37 MMHG
TRICUSPID ANNULAR PLANE SYSTOLIC EXCURSION: 2 CM
TV REST PULMONARY ARTERY PRESSURE: 51 MMHG
WBC # BLD AUTO: 5.76 K/UL (ref 3.9–12.7)
WBC # BLD AUTO: 7.9 K/UL (ref 3.9–12.7)

## 2022-03-28 PROCEDURE — 36415 COLL VENOUS BLD VENIPUNCTURE: CPT | Performed by: NURSE PRACTITIONER

## 2022-03-28 PROCEDURE — 85730 THROMBOPLASTIN TIME PARTIAL: CPT | Mod: ER | Performed by: EMERGENCY MEDICINE

## 2022-03-28 PROCEDURE — U0002 COVID-19 LAB TEST NON-CDC: HCPCS | Mod: ER | Performed by: EMERGENCY MEDICINE

## 2022-03-28 PROCEDURE — 93010 EKG 12-LEAD: ICD-10-PCS | Mod: ,,, | Performed by: INTERNAL MEDICINE

## 2022-03-28 PROCEDURE — 85610 PROTHROMBIN TIME: CPT | Mod: ER | Performed by: EMERGENCY MEDICINE

## 2022-03-28 PROCEDURE — 85025 COMPLETE CBC W/AUTO DIFF WBC: CPT | Mod: ER | Performed by: EMERGENCY MEDICINE

## 2022-03-28 PROCEDURE — 80048 BASIC METABOLIC PNL TOTAL CA: CPT | Performed by: NURSE PRACTITIONER

## 2022-03-28 PROCEDURE — 85025 COMPLETE CBC W/AUTO DIFF WBC: CPT | Mod: 91 | Performed by: NURSE PRACTITIONER

## 2022-03-28 PROCEDURE — 25000003 PHARM REV CODE 250: Performed by: INTERNAL MEDICINE

## 2022-03-28 PROCEDURE — 63600175 PHARM REV CODE 636 W HCPCS: Performed by: INTERNAL MEDICINE

## 2022-03-28 PROCEDURE — 80053 COMPREHEN METABOLIC PANEL: CPT | Mod: ER | Performed by: EMERGENCY MEDICINE

## 2022-03-28 PROCEDURE — 63600175 PHARM REV CODE 636 W HCPCS: Performed by: NURSE PRACTITIONER

## 2022-03-28 PROCEDURE — 94761 N-INVAS EAR/PLS OXIMETRY MLT: CPT

## 2022-03-28 PROCEDURE — 93010 ELECTROCARDIOGRAM REPORT: CPT | Mod: ,,, | Performed by: INTERNAL MEDICINE

## 2022-03-28 PROCEDURE — 63600175 PHARM REV CODE 636 W HCPCS: Mod: ER | Performed by: EMERGENCY MEDICINE

## 2022-03-28 PROCEDURE — 11000001 HC ACUTE MED/SURG PRIVATE ROOM

## 2022-03-28 PROCEDURE — 93005 ELECTROCARDIOGRAM TRACING: CPT

## 2022-03-28 RX ORDER — CARVEDILOL 3.12 MG/1
3.12 TABLET ORAL 2 TIMES DAILY
Status: DISCONTINUED | OUTPATIENT
Start: 2022-03-28 | End: 2022-04-04 | Stop reason: HOSPADM

## 2022-03-28 RX ORDER — HYDROCODONE BITARTRATE AND ACETAMINOPHEN 5; 325 MG/1; MG/1
1 TABLET ORAL EVERY 6 HOURS PRN
Status: DISCONTINUED | OUTPATIENT
Start: 2022-03-28 | End: 2022-04-03

## 2022-03-28 RX ORDER — HYDROMORPHONE HYDROCHLORIDE 2 MG/ML
1 INJECTION, SOLUTION INTRAMUSCULAR; INTRAVENOUS; SUBCUTANEOUS EVERY 4 HOURS PRN
Status: DISCONTINUED | OUTPATIENT
Start: 2022-03-28 | End: 2022-04-04 | Stop reason: HOSPADM

## 2022-03-28 RX ORDER — MORPHINE SULFATE 4 MG/ML
2 INJECTION, SOLUTION INTRAMUSCULAR; INTRAVENOUS ONCE AS NEEDED
Status: COMPLETED | OUTPATIENT
Start: 2022-03-28 | End: 2022-03-28

## 2022-03-28 RX ORDER — IPRATROPIUM BROMIDE AND ALBUTEROL SULFATE 2.5; .5 MG/3ML; MG/3ML
3 SOLUTION RESPIRATORY (INHALATION) EVERY 4 HOURS PRN
Status: DISCONTINUED | OUTPATIENT
Start: 2022-03-28 | End: 2022-04-04 | Stop reason: HOSPADM

## 2022-03-28 RX ORDER — SODIUM CHLORIDE 9 MG/ML
INJECTION, SOLUTION INTRAVENOUS CONTINUOUS
Status: ACTIVE | OUTPATIENT
Start: 2022-03-28 | End: 2022-03-29

## 2022-03-28 RX ORDER — ONDANSETRON 2 MG/ML
4 INJECTION INTRAMUSCULAR; INTRAVENOUS EVERY 8 HOURS PRN
Status: DISCONTINUED | OUTPATIENT
Start: 2022-03-28 | End: 2022-04-04 | Stop reason: HOSPADM

## 2022-03-28 RX ORDER — ACETAMINOPHEN 325 MG/1
650 TABLET ORAL EVERY 6 HOURS PRN
Status: DISCONTINUED | OUTPATIENT
Start: 2022-03-28 | End: 2022-04-04 | Stop reason: HOSPADM

## 2022-03-28 RX ORDER — BISACODYL 10 MG
10 SUPPOSITORY, RECTAL RECTAL DAILY PRN
Status: DISCONTINUED | OUTPATIENT
Start: 2022-03-28 | End: 2022-04-04 | Stop reason: HOSPADM

## 2022-03-28 RX ORDER — HYDRALAZINE HYDROCHLORIDE 20 MG/ML
5 INJECTION INTRAMUSCULAR; INTRAVENOUS EVERY 8 HOURS PRN
Status: DISCONTINUED | OUTPATIENT
Start: 2022-03-28 | End: 2022-04-04 | Stop reason: HOSPADM

## 2022-03-28 RX ORDER — SODIUM CHLORIDE 0.9 % (FLUSH) 0.9 %
10 SYRINGE (ML) INJECTION EVERY 12 HOURS PRN
Status: DISCONTINUED | OUTPATIENT
Start: 2022-03-28 | End: 2022-04-04 | Stop reason: HOSPADM

## 2022-03-28 RX ORDER — NALOXONE HCL 0.4 MG/ML
0.02 VIAL (ML) INJECTION
Status: DISCONTINUED | OUTPATIENT
Start: 2022-03-28 | End: 2022-04-04 | Stop reason: HOSPADM

## 2022-03-28 RX ORDER — ATORVASTATIN CALCIUM 10 MG/1
20 TABLET, FILM COATED ORAL NIGHTLY
Status: DISCONTINUED | OUTPATIENT
Start: 2022-03-28 | End: 2022-04-04 | Stop reason: HOSPADM

## 2022-03-28 RX ORDER — MAG HYDROX/ALUMINUM HYD/SIMETH 200-200-20
30 SUSPENSION, ORAL (FINAL DOSE FORM) ORAL 4 TIMES DAILY PRN
Status: DISCONTINUED | OUTPATIENT
Start: 2022-03-28 | End: 2022-04-04 | Stop reason: HOSPADM

## 2022-03-28 RX ORDER — GABAPENTIN 300 MG/1
300 CAPSULE ORAL NIGHTLY
Status: DISCONTINUED | OUTPATIENT
Start: 2022-03-28 | End: 2022-04-04 | Stop reason: HOSPADM

## 2022-03-28 RX ADMIN — GABAPENTIN 300 MG: 300 CAPSULE ORAL at 08:03

## 2022-03-28 RX ADMIN — HYDRALAZINE HYDROCHLORIDE 5 MG: 20 INJECTION, SOLUTION INTRAMUSCULAR; INTRAVENOUS at 02:03

## 2022-03-28 RX ADMIN — CARVEDILOL 3.12 MG: 3.12 TABLET, FILM COATED ORAL at 09:03

## 2022-03-28 RX ADMIN — MORPHINE SULFATE 2 MG: 4 INJECTION INTRAVENOUS at 02:03

## 2022-03-28 RX ADMIN — CARVEDILOL 3.12 MG: 3.12 TABLET, FILM COATED ORAL at 08:03

## 2022-03-28 RX ADMIN — HYDROMORPHONE HYDROCHLORIDE 1 MG: 2 INJECTION INTRAMUSCULAR; INTRAVENOUS; SUBCUTANEOUS at 05:03

## 2022-03-28 RX ADMIN — SODIUM CHLORIDE: 0.9 INJECTION, SOLUTION INTRAVENOUS at 05:03

## 2022-03-28 RX ADMIN — ATORVASTATIN CALCIUM 20 MG: 10 TABLET, FILM COATED ORAL at 08:03

## 2022-03-28 RX ADMIN — HYDROMORPHONE HYDROCHLORIDE 1 MG: 2 INJECTION INTRAMUSCULAR; INTRAVENOUS; SUBCUTANEOUS at 04:03

## 2022-03-28 NOTE — H&P
[facility] Medicine  History & Physical    Patient Name: Pamella Levy  MRN: [id number]  Patient Class: IP- Inpatient  Admission Date: 3/27/2022  Attending Physician: Brigida Nava MD   Primary Care Provider: Curtis Samaniego DO         Patient information was obtained from patient, past medical records and ER records.     Subjective:     Principal Problem:Closed fracture of distal end of left femur    Chief Complaint:   Chief Complaint   Patient presents with    Hip Pain     Left hip pain, fell out of wheelchair pta, Billy lower below the knee amputee         HPI: Ms. Levy is a 89 yo female with a PMHx of COPD, CKD, dementia, iron deficiency anemia, HTN, HLD, and PVD s/p BKA who presented to [facility] ED in [facility] with complaints of left knee pain that started after suffering a fall PTA. Patient is wheelchair-bound and states she reached for her medications causing her to fall to the ground on her left lower extremity. She is reporting the pain as constant and severe. No aggravating or alleviating factors. Denies any other injury. Denies hitting her head or loss of consciousness. Denies any weakness, numbness/tingling, CP, SOB, ABD pain, N/V, back pain, HA, lightheadedness, dizziness, syncope, fever or chills. Work-up in the ED revealed left distal femur fracture. Case was discussed with Ortho Surgery per the ED, who recommended knee immobilizer and will see patient in consult. Patient was transferred to [facility] and admitted under Hospital Medicine services.   Patient is a Full Code.       Past Medical History:   Diagnosis Date    Arthritis     Cancer     Hypertension     Hypertension 10/25/2019    Myocardial infarction     Peripheral vascular disease 10/25/2019    Stroke     TIA    Thickened endometrium 10/25/2019       Past Surgical History:   Procedure Laterality Date    BKA Left     CARDIAC SURGERY      heart cath     SECTION      x 1    HYSTEROSCOPY WITH DILATION AND  CURETTAGE OF UTERUS N/A 11/13/2019    Procedure: HYSTEROSCOPY, WITH DILATION AND CURETTAGE OF UTERUS;  Surgeon: Ximena Thorne MD;  [facility];  Service: OB/GYN;  Laterality: N/A;    LEG AMPUTATION      left 2015 - right 2021       Review of patient's allergies indicates:   Allergen Reactions    Pregabalin Swelling    Tramadol Hallucinations       No current facility-administered medications on file prior to encounter.     Current Outpatient Medications on File Prior to Encounter   Medication Sig    atorvastatin (LIPITOR) 20 MG tablet TAKE 1 TABLET BY MOUTH EVERY DAY AT NIGHT    carvediloL (COREG) 3.125 MG tablet TAKE 1 TABLET BY MOUTH 2 TIMES DAILY.    gabapentin (NEURONTIN) 300 MG capsule TAKE 1 CAPSULE BY MOUTH EVERY DAY AT NIGHT    HYDROcodone-acetaminophen (NORCO) 5-325 mg per tablet Take 1 tablet by mouth every 4 (four) hours as needed.    traZODone (DESYREL) 50 MG tablet TAKE HALF TABLET BY MOUTH NIGHTLY AS NEEDED FOR INSOMNIA OR DEPRESSION     Family History       Problem Relation (Age of Onset)    Cancer Sister    Diabetes Brother, Sister    Heart attack Brother    Heart disease Brother, Brother, Sister    Hypertension Sister, Brother    Stroke Sister, Brother, Sister          Tobacco Use    Smoking status: Former Smoker     Types: Cigarettes    Smokeless tobacco: Never Used   Substance and Sexual Activity    Alcohol use: Not Currently    Drug use: Not Currently    Sexual activity: Not Currently     Review of Systems   Constitutional:  Negative for chills, diaphoresis, fatigue and fever.   Respiratory:  Negative for cough, shortness of breath and wheezing.    Cardiovascular:  Negative for chest pain, palpitations and leg swelling.   Gastrointestinal:  Negative for abdominal pain, constipation, diarrhea, nausea and vomiting.   Musculoskeletal:  Positive for arthralgias (Left knee) and gait problem. Negative for back pain, joint swelling and myalgias.   Skin:  Negative for pallor and  rash.   Neurological:  Negative for dizziness, syncope, weakness, light-headedness, numbness and headaches.   Psychiatric/Behavioral:  The patient is not nervous/anxious.    All other systems reviewed and are negative.  Objective:     Vital Signs (Most Recent):  Temp: 98.3 °F (36.8 °C) (03/28/22 0400)  Pulse: 70 (03/28/22 0400)  Resp: 18 (03/28/22 0512)  BP: (!) 196/81 (03/28/22 0400)  SpO2: 97 % (03/28/22 0400)   Vital Signs (24h Range):  Temp:  [98.3 °F (36.8 °C)-98.4 °F (36.9 °C)] 98.3 °F (36.8 °C)  Pulse:  [63-87] 70  Resp:  [16-18] 18  SpO2:  [97 %-100 %] 97 %  BP: (134-221)/(74-98) 196/81     Weight: 49.9 kg (110 lb 0.2 oz)  Body mass index is 19.49 kg/m².    Physical Exam  Vitals and nursing note reviewed.   Constitutional:       General: She is awake. She is not in acute distress.     Appearance: Normal appearance. She is well-developed. She is not diaphoretic.   HENT:      Head: Normocephalic and atraumatic.   Eyes:      Conjunctiva/sclera: Conjunctivae normal.      Comments: PERRL; EOM intact.   Cardiovascular:      Rate and Rhythm: Normal rate and regular rhythm. No extrasystoles are present.     Heart sounds: S1 normal and S2 normal. No murmur heard.  Pulmonary:      Effort: Pulmonary effort is normal. No tachypnea.      Breath sounds: Normal breath sounds and air entry. No wheezing, rhonchi or rales.   Abdominal:      General: Bowel sounds are normal. There is no distension.      Palpations: Abdomen is soft.      Tenderness: There is no abdominal tenderness.   Musculoskeletal:         General: Normal range of motion.      Cervical back: Normal range of motion and neck supple.      Left knee: Swelling, deformity and ecchymosis present. Tenderness present.      Right lower leg: No edema.      Left lower leg: No edema.      Right Lower Extremity: Right leg is amputated below knee.      Left Lower Extremity: Left leg is amputated below knee.   Skin:     General: Skin is warm and dry.      Capillary Refill:  Capillary refill takes less than 2 seconds.      Findings: No erythema or rash.   Neurological:      General: No focal deficit present.      Mental Status: She is alert and oriented to person, place, and time.   Psychiatric:         Mood and Affect: Mood and affect normal.         Behavior: Behavior normal. Behavior is cooperative.           Significant Labs:  Results for orders placed or performed during the hospital encounter of 03/27/22   CBC auto differential   Result Value Ref Range    WBC 7.90 3.90 - 12.70 K/uL    RBC 3.74 (L) 4.00 - 5.40 M/uL    Hemoglobin 11.7 (L) 12.0 - 16.0 g/dL    Hematocrit 36.9 (L) 37.0 - 48.5 %    MCV 99 (H) 82 - 98 fL    MCH 31.3 (H) 27.0 - 31.0 pg    MCHC 31.7 (L) 32.0 - 36.0 g/dL    RDW 14.3 11.5 - 14.5 %    Platelets 232 150 - 450 K/uL    MPV 9.6 9.2 - 12.9 fL    Immature Granulocytes 0.5 0.0 - 0.5 %    Gran # (ANC) 6.5 1.8 - 7.7 K/uL    Immature Grans (Abs) 0.04 0.00 - 0.04 K/uL    Lymph # 0.9 (L) 1.0 - 4.8 K/uL    Mono # 0.3 0.3 - 1.0 K/uL    Eos # 0.1 0.0 - 0.5 K/uL    Baso # 0.03 0.00 - 0.20 K/uL    nRBC 0 0 /100 WBC    Gran % 82.1 (H) 38.0 - 73.0 %    Lymph % 11.8 (L) 18.0 - 48.0 %    Mono % 4.3 4.0 - 15.0 %    Eosinophil % 0.9 0.0 - 8.0 %    Basophil % 0.4 0.0 - 1.9 %    Differential Method Automated    Comprehensive metabolic panel   Result Value Ref Range    Sodium 139 136 - 145 mmol/L    Potassium 4.3 3.5 - 5.1 mmol/L    Chloride 105 95 - 110 mmol/L    CO2 25 23 - 29 mmol/L    Glucose 112 (H) 70 - 110 mg/dL    BUN 12 10 - 30 mg/dL    Creatinine 0.6 0.5 - 1.4 mg/dL    Calcium 9.7 8.7 - 10.5 mg/dL    Total Protein 7.5 6.0 - 8.4 g/dL    Albumin 3.5 3.5 - 5.2 g/dL    Total Bilirubin 0.4 0.1 - 1.0 mg/dL    Alkaline Phosphatase 95 55 - 135 U/L    AST 18 10 - 40 U/L    ALT 16 10 - 44 U/L    Anion Gap 9 8 - 16 mmol/L    eGFR if African American >60.0 >60 mL/min/1.73 m^2    eGFR if non African American >60.0 >60 mL/min/1.73 m^2   APTT   Result Value Ref Range    aPTT 26.0 21.0 -  32.0 sec   Protime-INR   Result Value Ref Range    Prothrombin Time 11.6 9.0 - 12.5 sec    INR 1.1 0.8 - 1.2   POCT COVID-19 Rapid Screening   Result Value Ref Range    POC Rapid COVID Negative Negative     Acceptable Yes       All pertinent labs within the past 24 hours have been reviewed.    Significant Imaging:  Imaging Results              X-Ray Knee 3 View Left (Preliminary result)  Result time 03/28/22 00:29:51      ED Interpretation by John Dodge MD (03/28/22 00:29:51, [facility] Emergency Dept, Emergency Medicine)    Distal femur fracture                                     X-Ray Femur AP/LAT Left (In process)                      X-Ray Pelvis Routine AP (In process)                    I have reviewed all pertinent imaging results/findings within the past 24 hours.            Assessment/Plan:     * Closed fracture of distal end of left femur  - Knee immobilizer in place.   - Ortho Surgery consult. Will keep NPO.  - Analgesics as needed.   - Will order preop EKG, echo, CXR.    Hyperlipidemia  - Continue home statin.     Dementia without behavioral disturbance  - Monitor for delirium.     Chronic obstructive pulmonary disease  - Bronchodilators and O2 supplementation as needed.     Primary hypertension  - Continue home antihypertensives.       VTE Risk Mitigation (From admission, onward)         Ordered     Reason for No Pharmacological VTE Prophylaxis  Once        Question:  Reasons:  Answer:  Risk of Bleeding    03/28/22 0505     IP VTE HIGH RISK PATIENT  Once         03/28/22 0505     Place sequential compression device  Until discontinued         03/28/22 0505                   Vicki Gilbert NP  Department of Hospital Medicine   [facility] - Med Surg

## 2022-03-28 NOTE — SUBJECTIVE & OBJECTIVE
Past Medical History:   Diagnosis Date    Arthritis     Cancer     Hypertension     Hypertension 10/25/2019    Myocardial infarction     Peripheral vascular disease 10/25/2019    Stroke     TIA    Thickened endometrium 10/25/2019       Past Surgical History:   Procedure Laterality Date    BKA Left     CARDIAC SURGERY      heart cath     SECTION      x 1    HYSTEROSCOPY WITH DILATION AND CURETTAGE OF UTERUS N/A 2019    Procedure: HYSTEROSCOPY, WITH DILATION AND CURETTAGE OF UTERUS;  Surgeon: Ximena Thorne MD;  Location: [facility];  Service: OB/GYN;  Laterality: N/A;    LEG AMPUTATION      left  - right        Review of patient's allergies indicates:   Allergen Reactions    Pregabalin Swelling    Tramadol Hallucinations       No current facility-administered medications on file prior to encounter.     Current Outpatient Medications on File Prior to Encounter   Medication Sig    atorvastatin (LIPITOR) 20 MG tablet TAKE 1 TABLET BY MOUTH EVERY DAY AT NIGHT    carvediloL (COREG) 3.125 MG tablet TAKE 1 TABLET BY MOUTH 2 TIMES DAILY.    gabapentin (NEURONTIN) 300 MG capsule TAKE 1 CAPSULE BY MOUTH EVERY DAY AT NIGHT    HYDROcodone-acetaminophen (NORCO) 5-325 mg per tablet Take 1 tablet by mouth every 4 (four) hours as needed.    traZODone (DESYREL) 50 MG tablet TAKE HALF TABLET BY MOUTH NIGHTLY AS NEEDED FOR INSOMNIA OR DEPRESSION     Family History       Problem Relation (Age of Onset)    Cancer Sister    Diabetes Brother, Sister    Heart attack Brother    Heart disease Brother, Brother, Sister    Hypertension Sister, Brother    Stroke Sister, Brother, Sister          Tobacco Use    Smoking status: Former Smoker     Types: Cigarettes    Smokeless tobacco: Never Used   Substance and Sexual Activity    Alcohol use: Not Currently    Drug use: Not Currently    Sexual activity: Not Currently     Review of Systems   Constitutional:  Negative for chills, diaphoresis, fatigue and  fever.   Respiratory:  Negative for cough, shortness of breath and wheezing.    Cardiovascular:  Negative for chest pain, palpitations and leg swelling.   Gastrointestinal:  Negative for abdominal pain, constipation, diarrhea, nausea and vomiting.   Musculoskeletal:  Positive for arthralgias (Left knee) and gait problem. Negative for back pain, joint swelling and myalgias.   Skin:  Negative for pallor and rash.   Neurological:  Negative for dizziness, syncope, weakness, light-headedness, numbness and headaches.   Psychiatric/Behavioral:  The patient is not nervous/anxious.    All other systems reviewed and are negative.  Objective:     Vital Signs (Most Recent):  Temp: 98.3 °F (36.8 °C) (03/28/22 0400)  Pulse: 70 (03/28/22 0400)  Resp: 18 (03/28/22 0512)  BP: (!) 196/81 (03/28/22 0400)  SpO2: 97 % (03/28/22 0400)   Vital Signs (24h Range):  Temp:  [98.3 °F (36.8 °C)-98.4 °F (36.9 °C)] 98.3 °F (36.8 °C)  Pulse:  [63-87] 70  Resp:  [16-18] 18  SpO2:  [97 %-100 %] 97 %  BP: (134-221)/(74-98) 196/81     Weight: 49.9 kg (110 lb 0.2 oz)  Body mass index is 19.49 kg/m².    Physical Exam  Vitals and nursing note reviewed.   Constitutional:       General: She is awake. She is not in acute distress.     Appearance: Normal appearance. She is well-developed. She is not diaphoretic.   HENT:      Head: Normocephalic and atraumatic.   Eyes:      Conjunctiva/sclera: Conjunctivae normal.      Comments: PERRL; EOM intact.   Cardiovascular:      Rate and Rhythm: Normal rate and regular rhythm. No extrasystoles are present.     Heart sounds: S1 normal and S2 normal. No murmur heard.  Pulmonary:      Effort: Pulmonary effort is normal. No tachypnea.      Breath sounds: Normal breath sounds and air entry. No wheezing, rhonchi or rales.   Abdominal:      General: Bowel sounds are normal. There is no distension.      Palpations: Abdomen is soft.      Tenderness: There is no abdominal tenderness.   Musculoskeletal:         General: Normal  range of motion.      Cervical back: Normal range of motion and neck supple.      Left knee: Swelling, deformity and ecchymosis present. Tenderness present.      Right lower leg: No edema.      Left lower leg: No edema.      Right Lower Extremity: Right leg is amputated below knee.      Left Lower Extremity: Left leg is amputated below knee.   Skin:     General: Skin is warm and dry.      Capillary Refill: Capillary refill takes less than 2 seconds.      Findings: No erythema or rash.   Neurological:      General: No focal deficit present.      Mental Status: She is alert and oriented to person, place, and time.   Psychiatric:         Mood and Affect: Mood and affect normal.         Behavior: Behavior normal. Behavior is cooperative.           Significant Labs:  Results for orders placed or performed during the hospital encounter of 03/27/22   CBC auto differential   Result Value Ref Range    WBC 7.90 3.90 - 12.70 K/uL    RBC 3.74 (L) 4.00 - 5.40 M/uL    Hemoglobin 11.7 (L) 12.0 - 16.0 g/dL    Hematocrit 36.9 (L) 37.0 - 48.5 %    MCV 99 (H) 82 - 98 fL    MCH 31.3 (H) 27.0 - 31.0 pg    MCHC 31.7 (L) 32.0 - 36.0 g/dL    RDW 14.3 11.5 - 14.5 %    Platelets 232 150 - 450 K/uL    MPV 9.6 9.2 - 12.9 fL    Immature Granulocytes 0.5 0.0 - 0.5 %    Gran # (ANC) 6.5 1.8 - 7.7 K/uL    Immature Grans (Abs) 0.04 0.00 - 0.04 K/uL    Lymph # 0.9 (L) 1.0 - 4.8 K/uL    Mono # 0.3 0.3 - 1.0 K/uL    Eos # 0.1 0.0 - 0.5 K/uL    Baso # 0.03 0.00 - 0.20 K/uL    nRBC 0 0 /100 WBC    Gran % 82.1 (H) 38.0 - 73.0 %    Lymph % 11.8 (L) 18.0 - 48.0 %    Mono % 4.3 4.0 - 15.0 %    Eosinophil % 0.9 0.0 - 8.0 %    Basophil % 0.4 0.0 - 1.9 %    Differential Method Automated    Comprehensive metabolic panel   Result Value Ref Range    Sodium 139 136 - 145 mmol/L    Potassium 4.3 3.5 - 5.1 mmol/L    Chloride 105 95 - 110 mmol/L    CO2 25 23 - 29 mmol/L    Glucose 112 (H) 70 - 110 mg/dL    BUN 12 10 - 30 mg/dL    Creatinine 0.6 0.5 - 1.4 mg/dL     Calcium 9.7 8.7 - 10.5 mg/dL    Total Protein 7.5 6.0 - 8.4 g/dL    Albumin 3.5 3.5 - 5.2 g/dL    Total Bilirubin 0.4 0.1 - 1.0 mg/dL    Alkaline Phosphatase 95 55 - 135 U/L    AST 18 10 - 40 U/L    ALT 16 10 - 44 U/L    Anion Gap 9 8 - 16 mmol/L    eGFR if African American >60.0 >60 mL/min/1.73 m^2    eGFR if non African American >60.0 >60 mL/min/1.73 m^2   APTT   Result Value Ref Range    aPTT 26.0 21.0 - 32.0 sec   Protime-INR   Result Value Ref Range    Prothrombin Time 11.6 9.0 - 12.5 sec    INR 1.1 0.8 - 1.2   POCT COVID-19 Rapid Screening   Result Value Ref Range    POC Rapid COVID Negative Negative     Acceptable Yes       All pertinent labs within the past 24 hours have been reviewed.    Significant Imaging:  Imaging Results              X-Ray Knee 3 View Left (Preliminary result)  Result time 03/28/22 00:29:51      ED Interpretation by John Dodge MD (03/28/22 00:29:51, [facility] - Emergency Dept, Emergency Medicine)    Distal femur fracture                                     X-Ray Femur AP/LAT Left (In process)                      X-Ray Pelvis Routine AP (In process)                    I have reviewed all pertinent imaging results/findings within the past 24 hours.

## 2022-03-28 NOTE — ASSESSMENT & PLAN NOTE
- Knee immobilizer in place.   - Ortho Surgery consult. Will keep NPO.  - Analgesics as needed.   - Will order preop EKG, echo, CXR.

## 2022-03-28 NOTE — PLAN OF CARE
Problem: Adult Inpatient Plan of Care  Goal: Plan of Care Review  Outcome: Ongoing, Progressing   Pt admitted to floor via ambulance transport. Daughter at bedside. Pt transferred from stretcher to bed per 2 person assistance. Mild grimace noted upon transfer. Pt c/o pain in Left knee region. Noted swelling to knee region. No wounds noted to pt. VSS. NAD. Chart check completed. Pt monitoring ongoing.

## 2022-03-28 NOTE — ED PROVIDER NOTES
Encounter Date: 3/27/2022       History     Chief Complaint   Patient presents with    Hip Pain     Left hip pain, fell out of wheelchair pta, Billy lower below the knee amputee      Patient is a 89-year-old female who presents today with complaints of acute onset left knee pain.  Patient is wheelchair-bound and states that she reached for her medications causing her to fall onto that left lower extremity.  She is reporting the pain as constant and severe.  Denies any other injury.  Denies hitting her head or loss of consciousness.  Prior treatment with hydrocodone 5 just prior to arrival        Review of patient's allergies indicates:   Allergen Reactions    Pregabalin Swelling    Tramadol Hallucinations     Past Medical History:   Diagnosis Date    Arthritis     Cancer     Hypertension     Hypertension 10/25/2019    Myocardial infarction     Peripheral vascular disease 10/25/2019    Stroke     TIA    Thickened endometrium 10/25/2019     Past Surgical History:   Procedure Laterality Date    BKA Left     CARDIAC SURGERY      heart cath     SECTION      x 1    HYSTEROSCOPY WITH DILATION AND CURETTAGE OF UTERUS N/A 2019    Procedure: HYSTEROSCOPY, WITH DILATION AND CURETTAGE OF UTERUS;  Surgeon: Ximena Thorne MD;  Location: [facility];  Service: OB/GYN;  Laterality: N/A;    LEG AMPUTATION      left  - right      Family History   Problem Relation Age of Onset    Hypertension Sister     Stroke Sister     Hypertension Brother     Cancer Sister     Heart disease Brother     Heart attack Brother     Diabetes Brother     Stroke Brother     Heart disease Brother     Diabetes Sister     Stroke Sister     Heart disease Sister      Social History     Tobacco Use    Smoking status: Former Smoker     Types: Cigarettes    Smokeless tobacco: Never Used   Substance Use Topics    Alcohol use: Not Currently    Drug use: Not Currently     Review of Systems     Constitutional: No  fevers, no fatigue  HENT: No headache, no facial pain, no hearing loss  Eyes: No vision changes, no eye pain  Neck/Back: No neck pain, no back pain  Cardiovascular: No chest pain, no palpitations, no syncope  Respiratory: no SOB, no cough  Abdominal: no abdominal pain, no N/V  Genitourinary: no pelvic pain, no genital pain  Musculoskeletal: L knee pain  Neurological: No numbness, no paresthesias, no weakness, no LOC      Physical Exam     Initial Vitals [03/27/22 2254]   BP Pulse Resp Temp SpO2   134/87 87 18 98.4 °F (36.9 °C) 97 %      MAP       --         Physical Exam  Constitutional: Awake, alert, in distress any time her leg is moved due to pain  HENT: normocephalic, no facial bone tenderness, no evidence of basilar skull fx  Eyes: PERRL, EOM, normal conjunctiva  Neck: Trachea midline, nontender, full ROM  Cardiovascular: RRR, 2+ palpable pulses in all 4 extremities  Pulmonary: Non-labored respirations, equal bilateral breath sounds, LCTAB  Chest Wall: No tenderness, no deformity  Abdominal: Soft, nontender, nondistended  Back: Nontender, no step-offs  Musculoskeletal:  Bilateral BKA.  Tenderness and swelling to the left distal femur.  No hip tenderness  Neurological: AAO x4, GCS 15, maintaining airway and answering questions appropriately, no focal deficits  Skin:  No lacerations    ED Course   Procedures  Labs Reviewed   CBC W/ AUTO DIFFERENTIAL - Abnormal; Notable for the following components:       Result Value    RBC 3.74 (*)     Hemoglobin 11.7 (*)     Hematocrit 36.9 (*)     MCV 99 (*)     MCH 31.3 (*)     MCHC 31.7 (*)     Lymph # 0.9 (*)     Gran % 82.1 (*)     Lymph % 11.8 (*)     All other components within normal limits   COMPREHENSIVE METABOLIC PANEL   APTT   PROTIME-INR          Imaging Results          X-Ray Knee 3 View Left (Preliminary result)  Result time 03/28/22 00:29:51    ED Interpretation by John Dodge MD (03/28/22 00:29:51, [facility] Emergency Dept, Emergency Medicine)    Distal  femur fracture                             X-Ray Femur AP/LAT Left (In process)                X-Ray Pelvis Routine AP (In process)                  Medications   morphine injection 2 mg (has no administration in time range)   morphine injection 4 mg (4 mg Intramuscular Given 3/27/22 3044)     Consult with Orthopedics Dr. Brown, who advised transfer to [facility] with admission hospital medicine.  Knee immobilizer for transport    Discussed with Hospital Medicine who advised admit med surg Dr. Barreto    Medical Decision Making:   Distal femur fracture.  Patient will need admission to medicine.  Patient will need transfer since we do not have Orthopedic services at this facility.  Patient and family voiced understanding with plan of care including the need for transfer.  Dr. Barreto at [facility] accepted transfer.  Patient will be transferred via [facility] with pulse ox monitoring en route                      Clinical Impression:   Final diagnoses:  [M79.605] Left leg pain  [W19.XXXA] Fall  [S72.92XA] Closed fracture of left femur, unspecified fracture morphology, unspecified portion of femur, initial encounter (Primary)          ED Disposition Condition    Admit               John Dodge MD  03/28/22 5276

## 2022-03-28 NOTE — SIGNIFICANT EVENT
Mr Levy is a 89 year old female with PMHx of left BKA who presented to [facility] for evaluation of left leg pain after experiencing a fall. Patient is wheelchair bound. CT of the left knee showed comminuted impacted diametaphyseal fracture. Patient transferred to [facility] for further evaluation and treatment. Orthopedic Surgery consulted, she is currently NPO x meds for possible surgery.

## 2022-03-28 NOTE — CONSULTS
[facility] Surg  Orthopedics  Consult Note    Patient Name: Pamella Levy  MRN: [id number]  Admission Date: 3/27/2022  Hospital Length of Stay: 0 days  Attending Provider: Brigida Nava MD  Primary Care Provider: wGendolyn Swain NP    Patient information was obtained from patient and ER records.     Inpatient consult to Orthopedic Surgery  Consult performed by: Emile Montgomery MD  Consult ordered by: Vicki Gilbert NP  Reason for consult: Left closed distal femur fracture  Assessment/Recommendations: Patient is a 89-year-old female with a left closed distal femur fracture in the setting of a previous BKA and nonambulatory status.      I did have a thorough discussion with the patient's daughter who was her medical caregiver.  We discussed operative versus non operative treatment for the distal femur fracture.  Given the patient's baseline level of being wheelchair-bound and flexion contracture I indicated to the daughter that there may not be much benefit to operative fixation.  The daughter is interested in non operative management of the distal femur fracture.    The patient can be nonweightbearing to the left lower extremity for the next 6 weeks with transfers only.  She did have difficulty being placed into a knee immobilizer given her knee flexion contracture.  I placed a physical therapy order for placement of a hinged knee brace and they can work with her on transfers.  Patient can be discharged whenever her pain is controlled to a rehab facility.  She will follow back up in clinic in 3-4 weeks with repeat radiographs of the left knee.        Subjective:     Principal Problem:Closed fracture of distal end of left femur    Chief Complaint:  Left closed distal femur fracture  Chief Complaint   Patient presents with    Hip Pain     Left hip pain, fell out of wheelchair pta, Billy lower below the knee amputee         HPI:  Patient is a 89-year-old female who sustained a ground level fall from her  wheelchair yesterday.  Of note, patient had a previous BKA done to the ipsilateral side 1 year ago for what the daughter describes as vascular issues.  Patient at baseline is nonambulatory in a wheelchair.  Patient was trying to check her blood pressure when she fell onto her left side.  Noted immediate pain deformity.  Was brought to [facility] feel where radiographs were taken.  She was found to have a distal femur fracture and was transferred over to [facility] for continued care and pain management.  The patient's daughter states that her mother is having significant pain at this point in time.  Notes increased pain at the knee.  Attempt was made in the josh the knee immobilizer.  [facility] department to place the patient in a knee immobilizer.  However, the patient has a knee flexion contracture at baseline which made it difficult for her to fit into the knee immobilizer.    Past Medical History:   Diagnosis Date    Arthritis     Cancer     Hypertension     Hypertension 10/25/2019    Myocardial infarction     Peripheral vascular disease 10/25/2019    Stroke     TIA    Thickened endometrium 10/25/2019       Past Surgical History:   Procedure Laterality Date    BKA Left     CARDIAC SURGERY      heart cath     SECTION      x 1    HYSTEROSCOPY WITH DILATION AND CURETTAGE OF UTERUS N/A 2019    Procedure: HYSTEROSCOPY, WITH DILATION AND CURETTAGE OF UTERUS;  Surgeon: Ximena Thorne MD;  Location: [facility];  Service: OB/GYN;  Laterality: N/A;    LEG AMPUTATION      left  - right        Review of patient's allergies indicates:   Allergen Reactions    Pregabalin Swelling    Tramadol Hallucinations       Current Facility-Administered Medications   Medication    0.9%  NaCl infusion    acetaminophen tablet 650 mg    albuterol-ipratropium 2.5 mg-0.5 mg/3 mL nebulizer solution 3 mL    aluminum-magnesium hydroxide-simethicone 200-200-20 mg/5 mL suspension 30 mL    atorvastatin tablet 20 mg     "bisacodyL suppository 10 mg    carvediloL tablet 3.125 mg    gabapentin capsule 300 mg    hydrALAZINE injection 5 mg    HYDROcodone-acetaminophen 5-325 mg per tablet 1 tablet    HYDROmorphone (PF) injection 1 mg    naloxone 0.4 mg/mL injection 0.02 mg    ondansetron injection 4 mg    sodium chloride 0.9% flush 10 mL     Family History     Problem Relation (Age of Onset)    Cancer Sister    Diabetes Brother, Sister    Heart attack Brother    Heart disease Brother, Brother, Sister    Hypertension Sister, Brother    Stroke Sister, Brother, Sister        Tobacco Use    Smoking status: Former Smoker     Types: Cigarettes    Smokeless tobacco: Never Used   Substance and Sexual Activity    Alcohol use: Not Currently    Drug use: Not Currently    Sexual activity: Not Currently     Review of Systems   All other systems reviewed and are negative.    Objective:     Vital Signs (Most Recent):  Temp: 98.5 °F (36.9 °C) (03/28/22 1300)  Pulse: 70 (03/28/22 1300)  Resp: 16 (03/28/22 1300)  BP: (!) 205/159 (03/28/22 1300)  SpO2: 98 % (03/28/22 1300) Vital Signs (24h Range):  Temp:  [98 °F (36.7 °C)-98.5 °F (36.9 °C)] 98.5 °F (36.9 °C)  Pulse:  [63-87] 70  Resp:  [16-19] 16  SpO2:  [97 %-100 %] 98 %  BP: (134-221)/() 205/159     Weight: 49.9 kg (110 lb)  Height: 5' 3" (160 cm)  Body mass index is 19.49 kg/m².      Intake/Output Summary (Last 24 hours) at 3/28/2022 1310  Last data filed at 3/28/2022 0824  Gross per 24 hour   Intake 0 ml   Output --   Net 0 ml                   Left Knee Exam     Comments:  Previous BKA site noted.  Skin appears to be well healed from this.  She does have decreased sensation over the lower extremity.  Obvious deformity noted with mild swelling about the knee.  There is a fixed flexion contracture.  Pain with range of motion of the knee.  Tenderness to palpation about the fracture site.      Significant Labs: All pertinent labs within the past 24 hours have been " reviewed.    Significant Imaging: CT: I have reviewed all pertinent results/findings and my personal findings are:  Distal femur fracture that appears to be primarily extra-articular.  Significant osteopenia.  X-Ray: I have reviewed all pertinent results/findings and my personal findings are:  Displaced distal femur fracture with varus malalignment.  Significant diffuse osteopenia noted.  Previous BKA noted with significant portion of proximal fibula gone.    Assessment/Plan:     Active Diagnoses:    Diagnosis Date Noted POA    PRINCIPAL PROBLEM:  Closed fracture of distal end of left femur [S72.402A] 03/28/2022 Yes    Hyperlipidemia [E78.5] 03/28/2022 Yes     Chronic    Dementia without behavioral disturbance [F03.90] 05/10/2021 Yes     Chronic    Chronic obstructive pulmonary disease [J44.9] 05/10/2021 Yes     Chronic    Primary hypertension [I10] 10/25/2019 Yes     Chronic      Problems Resolved During this Admission:     Patient is a 89-year-old female with a left closed distal femur fracture in the setting of a previous BKA and nonambulatory status.      I did have a thorough discussion with the patient's daughter who was her medical caregiver.  We discussed operative versus non operative treatment for the distal femur fracture.  Given the patient's baseline level of being wheelchair-bound and flexion contracture I indicated to the daughter that there may not be much benefit to operative fixation.  The daughter is interested in non operative management of the distal femur fracture.    The patient can be nonweightbearing to the left lower extremity for the next 6 weeks with transfers only.  She did have difficulty being placed into a knee immobilizer given her knee flexion contracture.  I placed a physical therapy order for placement of a hinged knee brace and they can work with her on transfers.  Patient can be discharged whenever her pain is controlled to a rehab facility.  She will follow back up in  clinic in 3-4 weeks with repeat radiographs of the left knee.    Thank you for your consult.     Emile Montgomery MD  Orthopedic Surgeon
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
## 2022-03-28 NOTE — PLAN OF CARE
NS @100mL/hr. Pt is AAO, able to make needs known. Heart monitor 8716. PRN pain meds adm as needed. Requires max assist with ADL's.

## 2022-03-28 NOTE — HPI
Ms. Levy is a 89 yo female with a PMHx of COPD, CKD, dementia, iron deficiency anemia, HTN, HLD, and PVD s/p BKA who presented to [facility] ED in [facility] with complaints of left knee pain that started after suffering a fall PTA. Patient is wheelchair-bound and states she reached for her medications causing her to fall to the ground on her left lower extremity. She is reporting the pain as constant and severe. No aggravating or alleviating factors. Denies any other injury. Denies hitting her head or loss of consciousness. Denies any weakness, numbness/tingling, CP, SOB, ABD pain, N/V, back pain, HA, lightheadedness, dizziness, syncope, fever or chills. Work-up in the ED revealed left distal femur fracture. Case was discussed with Ortho Surgery per the ED, who recommended knee immobilizer and will see patient in consult. Patient was transferred to [facility] and admitted under Hospital Medicine services.   Patient is a Full Code.

## 2022-03-29 LAB
BASOPHILS # BLD AUTO: 0.03 K/UL (ref 0–0.2)
BASOPHILS NFR BLD: 0.6 % (ref 0–1.9)
DIFFERENTIAL METHOD: ABNORMAL
EOSINOPHIL # BLD AUTO: 0.1 K/UL (ref 0–0.5)
EOSINOPHIL NFR BLD: 1.7 % (ref 0–8)
ERYTHROCYTE [DISTWIDTH] IN BLOOD BY AUTOMATED COUNT: 14.4 % (ref 11.5–14.5)
HCT VFR BLD AUTO: 32.7 % (ref 37–48.5)
HGB BLD-MCNC: 10.3 G/DL (ref 12–16)
IMM GRANULOCYTES # BLD AUTO: 0.02 K/UL (ref 0–0.04)
IMM GRANULOCYTES NFR BLD AUTO: 0.4 % (ref 0–0.5)
LYMPHOCYTES # BLD AUTO: 0.7 K/UL (ref 1–4.8)
LYMPHOCYTES NFR BLD: 13.4 % (ref 18–48)
MCH RBC QN AUTO: 31 PG (ref 27–31)
MCHC RBC AUTO-ENTMCNC: 31.5 G/DL (ref 32–36)
MCV RBC AUTO: 99 FL (ref 82–98)
MONOCYTES # BLD AUTO: 0.3 K/UL (ref 0.3–1)
MONOCYTES NFR BLD: 6.3 % (ref 4–15)
NEUTROPHILS # BLD AUTO: 4 K/UL (ref 1.8–7.7)
NEUTROPHILS NFR BLD: 77.6 % (ref 38–73)
NRBC BLD-RTO: 0 /100 WBC
PLATELET # BLD AUTO: 198 K/UL (ref 150–450)
PMV BLD AUTO: 9.8 FL (ref 9.2–12.9)
RBC # BLD AUTO: 3.32 M/UL (ref 4–5.4)
WBC # BLD AUTO: 5.21 K/UL (ref 3.9–12.7)

## 2022-03-29 PROCEDURE — 94761 N-INVAS EAR/PLS OXIMETRY MLT: CPT

## 2022-03-29 PROCEDURE — 99231 PR SUBSEQUENT HOSPITAL CARE,LEVL I: ICD-10-PCS | Mod: ,,, | Performed by: PHYSICIAN ASSISTANT

## 2022-03-29 PROCEDURE — 36415 COLL VENOUS BLD VENIPUNCTURE: CPT | Performed by: NURSE PRACTITIONER

## 2022-03-29 PROCEDURE — 99231 SBSQ HOSP IP/OBS SF/LOW 25: CPT | Mod: ,,, | Performed by: PHYSICIAN ASSISTANT

## 2022-03-29 PROCEDURE — 11000001 HC ACUTE MED/SURG PRIVATE ROOM

## 2022-03-29 PROCEDURE — 97163 PT EVAL HIGH COMPLEX 45 MIN: CPT

## 2022-03-29 PROCEDURE — 85025 COMPLETE CBC W/AUTO DIFF WBC: CPT | Performed by: NURSE PRACTITIONER

## 2022-03-29 PROCEDURE — 25000003 PHARM REV CODE 250: Performed by: INTERNAL MEDICINE

## 2022-03-29 PROCEDURE — 63600175 PHARM REV CODE 636 W HCPCS: Performed by: NURSE PRACTITIONER

## 2022-03-29 PROCEDURE — 97167 OT EVAL HIGH COMPLEX 60 MIN: CPT

## 2022-03-29 PROCEDURE — 63600175 PHARM REV CODE 636 W HCPCS: Performed by: INTERNAL MEDICINE

## 2022-03-29 RX ADMIN — CARVEDILOL 3.12 MG: 3.12 TABLET, FILM COATED ORAL at 09:03

## 2022-03-29 RX ADMIN — GABAPENTIN 300 MG: 300 CAPSULE ORAL at 09:03

## 2022-03-29 RX ADMIN — ATORVASTATIN CALCIUM 20 MG: 10 TABLET, FILM COATED ORAL at 09:03

## 2022-03-29 RX ADMIN — HYDROCODONE BITARTRATE AND ACETAMINOPHEN 1 TABLET: 5; 325 TABLET ORAL at 03:03

## 2022-03-29 RX ADMIN — HYDROMORPHONE HYDROCHLORIDE 1 MG: 2 INJECTION INTRAMUSCULAR; INTRAVENOUS; SUBCUTANEOUS at 07:03

## 2022-03-29 RX ADMIN — HYDROMORPHONE HYDROCHLORIDE 1 MG: 2 INJECTION INTRAMUSCULAR; INTRAVENOUS; SUBCUTANEOUS at 06:03

## 2022-03-29 RX ADMIN — HYDROCODONE BITARTRATE AND ACETAMINOPHEN 1 TABLET: 5; 325 TABLET ORAL at 06:03

## 2022-03-29 RX ADMIN — HYDRALAZINE HYDROCHLORIDE 5 MG: 20 INJECTION, SOLUTION INTRAMUSCULAR; INTRAVENOUS at 05:03

## 2022-03-29 NOTE — HOSPITAL COURSE
Ms Levy is a 89 year old female who presented to [facility] for evaluation of left knee pain after experiencing a fall. She has left BKA. CT of left knee showed comminuted impacted diametaphyseal fracture. Orthopedic Surgery consulted. Patient was evaluated by Dr Montgomery, she will be managed non surgically, since she is non ambulatory, can let her weightbear through transfers. Awaiting SNF placement. As of 3/30/2022, vital signs stable, no distress, reports left knee pain starting to decrease. PT/OT following. Awaiting placement.   As 3/31/2022, PT/OT recommend Basic Nursing Facility. Case reviewed with case managemet, patient possible accepted to [facility] . Daughter does not want patient to go to permanent Basic Nursing Facility, however there are safety concerns about discharging home. (Daughter and patient possible living in hotel before admission). As of 4/1/22 pt doing well, no new issues. Pain improved. Plan to discharge with son on Monday. As of 4/2/22 no change in status. Bp elevated, amlodipine added for better control. Will monitor. As of 4/3/22 pt lying in bed eating breakfast. No new issues. Continue current plan of care. Plans for discharge tomorrow. As of 4/4/22 pt lying in bed, no distress noted. Daughter at bedside. VSS. Home meds reconciled. Patient seen and examined on the date of discharge and found stable for discharge. [facility] has been arranged. Patient to follow-up outpatient with PCP and orthopedic surgery.

## 2022-03-29 NOTE — PT/OT/SLP EVAL
Physical Therapy Evaluation and Discharge Note    Patient Name:  Pamella Levy   MRN:  [id number]    Recommendations:     Discharge Recommendations:   (HOME WITH 24 HOUR CAREGIVERS)   Discharge Equipment Recommendations:     Barriers to discharge: None    Assessment:     Pamella Levy is a 89 y.o. female admitted with a medical diagnosis of Closed fracture of distal end of left femur. .  At this time, patient is functioning at their prior level of function and does not require further acute PT services.     Recent Surgery: * No surgery found *      Plan:     During this hospitalization, patient does not require further acute PT services.  Please re-consult if situation changes.      Subjective     Chief Complaint: PAIN L LE   Patient/Family Comments/goals: NONE STATED  Pain/Comfort:  · Pain Rating 1:  (NO NUMBER STATED)    Patients cultural, spiritual, Yazidism conflicts given the current situation:      Living Environment:  PT LIVES AT HOME WITH DAUGHTER IN A ONE STORY HOME WITH NO STEPS   Prior to admission, patients level of function was TOTAL CARE.  Equipment used at home: wheelchair.  DME owned (not currently used): none.  Upon discharge, patient will have assistance from DAUGHTER .    Objective:     Communicated with NURSE DUTTON AND [facility] CHART REVIEW  prior to session.  Patient found supine with peripheral IV upon PT entry to room.    General Precautions: Standard, fall   Orthopedic Precautions:LLE non weight bearing   Braces: Hinged knee brace   Respiratory Status: Room air    Exams:  · RLE ROM: LIMITED  · RLE Strength: NA  · LLE ROM: NA  · LLE Strength: NA    Functional Mobility:  · PT B UE ROM WFL. PT WAITING ON HINGED KNEE BRACE PER MD REQUEST. P.T. SPOKE WITH PT / DAUGHTER AND CHARGE NURSE FRANCISCA TO GET ONE ORDERED FOR PT COMFORT. PT LEFT WITH HOB ELEVATED AND ALL NEEDS MET       AM-PAC 6 CLICK MOBILITY  Total Score:7     Patient left HOB elevated with call button in reach.      History:     Past  Medical History:   Diagnosis Date    Arthritis     Cancer     Hypertension     Hypertension 10/25/2019    Myocardial infarction     Peripheral vascular disease 10/25/2019    Stroke     TIA    Thickened endometrium 10/25/2019       Past Surgical History:   Procedure Laterality Date    BKA Left     CARDIAC SURGERY      heart cath     SECTION      x 1    HYSTEROSCOPY WITH DILATION AND CURETTAGE OF UTERUS N/A 2019    Procedure: HYSTEROSCOPY, WITH DILATION AND CURETTAGE OF UTERUS;  Surgeon: Ximena Thorne MD;  Location: [facility];  Service: OB/GYN;  Laterality: N/A;    LEG AMPUTATION      left  - right        Time Tracking:     PT Received On: 22  PT Start Time: 1125     PT Stop Time: 1135  PT Total Time (min): 10 min     Billable Minutes: Evaluation 10      2022

## 2022-03-29 NOTE — CONSULTS
Cm contacted the brace line at [phone], [facility] will deliver brace to patients room.

## 2022-03-29 NOTE — SUBJECTIVE & OBJECTIVE
Interval History: Left Knee area fracture being manage non operatively. Awaiting SNF placement, pain control.     Review of Systems   Constitutional:  Negative for chills and fever.   HENT:  Positive for hearing loss. Negative for congestion, rhinorrhea and sinus pressure.    Respiratory:  Negative for apnea, cough, choking, chest tightness, shortness of breath, wheezing and stridor.    Cardiovascular:  Negative for chest pain, palpitations and leg swelling.   Gastrointestinal:  Negative for abdominal distention, abdominal pain, diarrhea, nausea and vomiting.   Endocrine: Negative for cold intolerance and heat intolerance.   Genitourinary:  Negative for difficulty urinating and hematuria.   Musculoskeletal:  Negative for arthralgias and joint swelling.        Left Knee pain and swelling    Skin:  Negative for color change, pallor and rash.   Neurological:  Negative for dizziness, seizures, weakness, numbness and headaches.   Psychiatric/Behavioral:  Negative for agitation. The patient is not nervous/anxious.    Objective:     Vital Signs (Most Recent):  Temp: 98.3 °F (36.8 °C) (03/29/22 1152)  Pulse: 79 (03/29/22 1152)  Resp: 20 (03/29/22 1229)  BP: 125/61 (03/29/22 1152)  SpO2: 97 % (03/29/22 1152) Vital Signs (24h Range):  Temp:  [98.1 °F (36.7 °C)-99.1 °F (37.3 °C)] 98.3 °F (36.8 °C)  Pulse:  [71-94] 79  Resp:  [17-22] 20  SpO2:  [94 %-99 %] 97 %  BP: (125-210)/(61-96) 125/61     Weight: 42.7 kg (94 lb 2.2 oz)  Body mass index is 16.68 kg/m².    Intake/Output Summary (Last 24 hours) at 3/29/2022 1359  Last data filed at 3/29/2022 1000  Gross per 24 hour   Intake 648.86 ml   Output 700 ml   Net -51.14 ml      Physical Exam  HENT:      Head: Normocephalic and atraumatic.   Cardiovascular:      Heart sounds: Murmur heard.     No friction rub. No gallop.   Pulmonary:      Effort: No respiratory distress.      Breath sounds: No stridor. No wheezing, rhonchi or rales.   Chest:      Chest wall: No tenderness.    Abdominal:      General: There is no distension.      Palpations: There is no mass.      Tenderness: There is no abdominal tenderness. There is no right CVA tenderness, left CVA tenderness, guarding or rebound.      Hernia: No hernia is present.   Musculoskeletal:         General: Swelling (Left knee and stump) present. No tenderness, deformity or signs of injury.      Right lower leg: No edema.      Left lower leg: No edema.   Skin:     Coloration: Skin is not jaundiced or pale.      Findings: No bruising, erythema, lesion or rash.   Neurological:      Cranial Nerves: No cranial nerve deficit.      Sensory: No sensory deficit.      Motor: No weakness.      Coordination: Coordination normal.      Deep Tendon Reflexes: Reflexes normal.       Significant Labs: All pertinent labs within the past 24 hours have been reviewed.  BMP:   Recent Labs   Lab 03/28/22  0616   *      K 3.9      CO2 24   BUN 11   CREATININE 0.5   CALCIUM 9.4     CBC:   Recent Labs   Lab 03/28/22  0117 03/28/22  0616 03/29/22  0445   WBC 7.90 5.76 5.21   HGB 11.7* 10.7* 10.3*   HCT 36.9* 33.9* 32.7*    216 198     Coagulation:   Recent Labs   Lab 03/28/22  0117   INR 1.1   APTT 26.0       Significant Imaging:     Imaging Results              X-Ray Knee 3 View Left (Final result)  Result time 03/28/22 07:59:34      Final result by John Cam MD (03/28/22 07:59:34)                   Impression:      Transverse fracture through the distal femoral diametaphysis.      Electronically signed by: John Cam MD  Date:    03/28/2022  Time:    07:59               Narrative:    EXAMINATION:  XR KNEE 3 VIEW LEFT    CLINICAL HISTORY:  Pain in left leg    TECHNIQUE:  AP, lateral, and Merchant views of the left knee were performed.    COMPARISON:  None    FINDINGS:  Osteopenia.  Transverse fracture through the distal femoral diametaphysis with angular deformity.  Below the knee amputation.                        ED Interpretation  by John Dodge MD (03/28/22 00:29:51, [facility] Dept, Emergency Medicine)    Distal femur fracture                                     X-Ray Femur AP/LAT Left (Final result)  Result time 03/28/22 08:00:31      Final result by John Cam MD (03/28/22 08:00:31)                   Impression:      As above.      Electronically signed by: John Cam MD  Date:    03/28/2022  Time:    08:00               Narrative:    EXAMINATION:  XR FEMUR 2 VIEW LEFT    CLINICAL HISTORY:  Pain in left leg    TECHNIQUE:  AP and lateral views of the left femur were performed.    COMPARISON:  None}    FINDINGS:  Osteopenia.  Impacted distal femoral transverse fracture of the diametaphysis.                                       X-Ray Pelvis Routine AP (Final result)  Result time 03/28/22 08:01:15      Final result by John Cam MD (03/28/22 08:01:15)                   Impression:      As above.      Electronically signed by: John Cam MD  Date:    03/28/2022  Time:    08:01               Narrative:    EXAMINATION:  XR PELVIS ROUTINE AP    CLINICAL HISTORY:  Unspecified fall, initial encounter    TECHNIQUE:  AP view of the pelvis was performed.    COMPARISON:  None.    FINDINGS:  Lumbar scoliotic curvature convex left.  Multilevel degenerative changes.  Osteopenia.  No acute osseous abnormality.

## 2022-03-29 NOTE — PROGRESS NOTES
[facility] Medicine  Progress Note    Patient Name: Pamella Levy  MRN: [id number]  Patient Class: IP- Inpatient   Admission Date: 3/27/2022  Length of Stay: 1 days  Attending Physician: Brigida Nava MD  Primary Care Provider: Gwendolyn Swain NP        Subjective:     Principal Problem:Closed fracture of distal end of left femur        HPI:  Ms. Levy is a 89 yo female with a PMHx of COPD, CKD, dementia, iron deficiency anemia, HTN, HLD, and PVD s/p BKA who presented to [facility] ED in [facility] with complaints of left knee pain that started after suffering a fall PTA. Patient is wheelchair-bound and states she reached for her medications causing her to fall to the ground on her left lower extremity. She is reporting the pain as constant and severe. No aggravating or alleviating factors. Denies any other injury. Denies hitting her head or loss of consciousness. Denies any weakness, numbness/tingling, CP, SOB, ABD pain, N/V, back pain, HA, lightheadedness, dizziness, syncope, fever or chills. Work-up in the ED revealed left distal femur fracture. Case was discussed with Ortho Surgery per the ED, who recommended knee immobilizer and will see patient in consult. Patient was transferred to [facility] and admitted under Hospital Medicine services.   Patient is a Full Code.       Overview/Hospital Course:  Ms Levy is a 89 year old female who presented to [facility] for evaluation of left knee pain after experiencing a fall. She has left BKA. CT of left knee showed comminuted impacted diametaphyseal fracture. Orthopedic Surgery consulted. Patient was evaluated by Dr Montgomery, she will be managed non surgically, since she is non ambulatory, can let her weightbear through transfers. Awaiting SNF placement.       Interval History: Left Knee area fracture being manage non operatively. Awaiting SNF placement, pain control.     Review of Systems   Constitutional:  Negative for chills and fever.   HENT:  Positive for  hearing loss. Negative for congestion, rhinorrhea and sinus pressure.    Respiratory:  Negative for apnea, cough, choking, chest tightness, shortness of breath, wheezing and stridor.    Cardiovascular:  Negative for chest pain, palpitations and leg swelling.   Gastrointestinal:  Negative for abdominal distention, abdominal pain, diarrhea, nausea and vomiting.   Endocrine: Negative for cold intolerance and heat intolerance.   Genitourinary:  Negative for difficulty urinating and hematuria.   Musculoskeletal:  Negative for arthralgias and joint swelling.        Left Knee pain and swelling    Skin:  Negative for color change, pallor and rash.   Neurological:  Negative for dizziness, seizures, weakness, numbness and headaches.   Psychiatric/Behavioral:  Negative for agitation. The patient is not nervous/anxious.    Objective:     Vital Signs (Most Recent):  Temp: 98.3 °F (36.8 °C) (03/29/22 1152)  Pulse: 79 (03/29/22 1152)  Resp: 20 (03/29/22 1229)  BP: 125/61 (03/29/22 1152)  SpO2: 97 % (03/29/22 1152) Vital Signs (24h Range):  Temp:  [98.1 °F (36.7 °C)-99.1 °F (37.3 °C)] 98.3 °F (36.8 °C)  Pulse:  [71-94] 79  Resp:  [17-22] 20  SpO2:  [94 %-99 %] 97 %  BP: (125-210)/(61-96) 125/61     Weight: 42.7 kg (94 lb 2.2 oz)  Body mass index is 16.68 kg/m².    Intake/Output Summary (Last 24 hours) at 3/29/2022 1359  Last data filed at 3/29/2022 1000  Gross per 24 hour   Intake 648.86 ml   Output 700 ml   Net -51.14 ml      Physical Exam  HENT:      Head: Normocephalic and atraumatic.   Cardiovascular:      Heart sounds: Murmur heard.     No friction rub. No gallop.   Pulmonary:      Effort: No respiratory distress.      Breath sounds: No stridor. No wheezing, rhonchi or rales.   Chest:      Chest wall: No tenderness.   Abdominal:      General: There is no distension.      Palpations: There is no mass.      Tenderness: There is no abdominal tenderness. There is no right CVA tenderness, left CVA tenderness, guarding or rebound.       Hernia: No hernia is present.   Musculoskeletal:         General: Swelling (Left knee and stump) present. No tenderness, deformity or signs of injury.      Right lower leg: No edema.      Left lower leg: No edema.   Skin:     Coloration: Skin is not jaundiced or pale.      Findings: No bruising, erythema, lesion or rash.   Neurological:      Cranial Nerves: No cranial nerve deficit.      Sensory: No sensory deficit.      Motor: No weakness.      Coordination: Coordination normal.      Deep Tendon Reflexes: Reflexes normal.       Significant Labs: All pertinent labs within the past 24 hours have been reviewed.  BMP:   Recent Labs   Lab 03/28/22  0616   *      K 3.9      CO2 24   BUN 11   CREATININE 0.5   CALCIUM 9.4     CBC:   Recent Labs   Lab 03/28/22  0117 03/28/22  0616 03/29/22  0445   WBC 7.90 5.76 5.21   HGB 11.7* 10.7* 10.3*   HCT 36.9* 33.9* 32.7*    216 198     Coagulation:   Recent Labs   Lab 03/28/22  0117   INR 1.1   APTT 26.0       Significant Imaging:     Imaging Results              X-Ray Knee 3 View Left (Final result)  Result time 03/28/22 07:59:34      Final result by John Cam MD (03/28/22 07:59:34)                   Impression:      Transverse fracture through the distal femoral diametaphysis.      Electronically signed by: John Cam MD  Date:    03/28/2022  Time:    07:59               Narrative:    EXAMINATION:  XR KNEE 3 VIEW LEFT    CLINICAL HISTORY:  Pain in left leg    TECHNIQUE:  AP, lateral, and Merchant views of the left knee were performed.    COMPARISON:  None    FINDINGS:  Osteopenia.  Transverse fracture through the distal femoral diametaphysis with angular deformity.  Below the knee amputation.                        ED Interpretation by John Dodge MD (03/28/22 00:29:51, [facility] Emergency Dept, Emergency Medicine)    Distal femur fracture                                     X-Ray Femur AP/LAT Left (Final result)  Result time 03/28/22  08:00:31      Final result by John Cam MD (03/28/22 08:00:31)                   Impression:      As above.      Electronically signed by: John Cam MD  Date:    03/28/2022  Time:    08:00               Narrative:    EXAMINATION:  XR FEMUR 2 VIEW LEFT    CLINICAL HISTORY:  Pain in left leg    TECHNIQUE:  AP and lateral views of the left femur were performed.    COMPARISON:  None}    FINDINGS:  Osteopenia.  Impacted distal femoral transverse fracture of the diametaphysis.                                       X-Ray Pelvis Routine AP (Final result)  Result time 03/28/22 08:01:15      Final result by John Cam MD (03/28/22 08:01:15)                   Impression:      As above.      Electronically signed by: John Cam MD  Date:    03/28/2022  Time:    08:01               Narrative:    EXAMINATION:  XR PELVIS ROUTINE AP    CLINICAL HISTORY:  Unspecified fall, initial encounter    TECHNIQUE:  AP view of the pelvis was performed.    COMPARISON:  None.    FINDINGS:  Lumbar scoliotic curvature convex left.  Multilevel degenerative changes.  Osteopenia.  No acute osseous abnormality.                                         Assessment/Plan:      * Closed fracture of distal end of left femur  - Knee immobilizer in place.   - Ortho Surgery consult. Will keep NPO.  - Analgesics as needed.   - Will order preop EKG, echo, CXR.    3/29/2022  Left knee area fracture being treated non surgically   PT/OT  Pain control   Henged Knee Brace     Hyperlipidemia  - Continue home statin.     Dementia without behavioral disturbance  - Monitor for delirium.     Chronic obstructive pulmonary disease  - Bronchodilators and O2 supplementation as needed.     Primary hypertension  - Continue home antihypertensives.       VTE Risk Mitigation (From admission, onward)         Ordered     Reason for No Pharmacological VTE Prophylaxis  Once        Question:  Reasons:  Answer:  Risk of Bleeding    03/28/22 0505     IP VTE HIGH RISK PATIENT   Once         03/28/22 0505     Place sequential compression device  Until discontinued         03/28/22 0505                Discharge Planning   BEATRIS:      Code Status: Full Code   Is the patient medically ready for discharge?:     Reason for patient still in hospital (select all that apply): Patient trending condition and Treatment  Discharge Plan A: Skilled Nursing Facility                  Anders Levy NP  Department of Hospital Medicine   '[facility] Surg

## 2022-03-29 NOTE — PLAN OF CARE
Problem: Adult Inpatient Plan of Care  Goal: Plan of Care Review  Outcome: Ongoing, Progressing   Pt remains injury free throughout shift. Daughter at bedside. No distress noted. Pt resting, calm, no s/s of pain or discomfort at current time. IVF infusing per md order. Chart check completed. VSS. Pt monitoring ongoing at this time.

## 2022-03-29 NOTE — CONSULTS
Cm contacted the brace line at [phone], [facility] will deliver brace to patients room.

## 2022-03-29 NOTE — ASSESSMENT & PLAN NOTE
- Knee immobilizer in place.   - Ortho Surgery consult. Will keep NPO.  - Analgesics as needed.   - Will order preop EKG, echo, CXR.    3/29/2022  Left knee area fracture being treated non surgically   PT/OT  Pain control   Henged Knee Brace

## 2022-03-29 NOTE — PT/OT/SLP EVAL
Occupational Therapy   Evaluation and Discharge Note    Name: Pamella Levy  MRN: [id number]  Admitting Diagnosis:  Closed fracture of distal end of left femur   Recent Surgery: * No surgery found *      Recommendations:     Discharge Recommendations: home, nursing facility, basic (HOME WITH 24 HOUR CARE VS BASIC NSG FACILITY)  Discharge Equipment Recommendations:  lift device  Barriers to discharge:  Inaccessible home environment    Assessment:     Pamella Levy is a 89 y.o. female with a medical diagnosis of Closed fracture of distal end of left femur. At this time, patient is functioning at their prior level of function and does not require further acute OT services.     Plan:     During this hospitalization, patient does not require further acute OT services.  Please re-consult if situation changes.    · Plan of Care Reviewed with: patient, daughter    Subjective     Chief Complaint: debility and generalized weakness  Patient/Family Comments/goals:     Occupational Profile:  Living Environment: nurse and epic chart review  Previous level of function:   Roles and Routines: occupational therapy  Equipment Used at home:  bedside commode  Assistance upon Discharge:     Pain/Comfort:       Patients cultural, spiritual, Yarsanism conflicts given the current situation:      Objective:     Communicated with: nurse and epic chart review prior to session.  Patient found HOB elevated with peripheral IV upon OT entry to room.    General Precautions: Standard, fall   Orthopedic Precautions:LLE non weight bearing   Braces: N/A    Occupational Performance:    Bed Mobility:    · Patient completed Rolling/Turning to Left with  maximal assistance  · Patient completed Rolling/Turning to Right with maximal assistance  · Patient completed Scooting/Bridging with maximal assistance      Activities of Daily Living:  · Upper Body Dressing: maximal assistance .  · Lower Body Dressing: total assistance .  · Toileting: total assistance  .    Cognitive/Visual Perceptual:  Cognitive/Psychosocial Skills:     -       Oriented to: Person   -       Follows Commands/attention:Follows one-step commands  -       Communication: unintelligible at times  -       Memory: Impaired STM, Impaired LTM and Poor immediate recall  -       Safety awareness/insight to disability: impaired     Physical Exam:  Upper Extremity Range of Motion:     -       Right Upper Extremity: WFL  -       Left Upper Extremity: WFL  Upper Extremity Strength: -       Right Upper Extremity: mmt: 4/5 grossly  -       Left Upper Extremity: mmt: 4/5 grossly   Strength:    -       Right Upper Extremity: mmt: 4/5 grossly  -       Left Upper Extremity: mmt : 4/5 grossly    AMPAC 6 Click ADL:  AMPAC Total Score: 6    Treatment & Education:  O.T. eval completed. Pt and daughter aware of O.T. POC. Pt currently requires total care at home and presently at [facility]. Therefore pt is discharged from skilled o.t and placed on people 's program   Education:    Patient left up in chair with all lines intact, call button in reach, nurse notified and daughter present    GOALS:   Multidisciplinary Problems     Occupational Therapy Goals     Not on file                History:     Past Medical History:   Diagnosis Date    Arthritis     Cancer     Hypertension     Hypertension 10/25/2019    Myocardial infarction     Peripheral vascular disease 10/25/2019    Stroke     TIA    Thickened endometrium 10/25/2019         Past Surgical History:   Procedure Laterality Date    BKA Left     CARDIAC SURGERY      heart cath     SECTION      x 1    HYSTEROSCOPY WITH DILATION AND CURETTAGE OF UTERUS N/A 2019    Procedure: HYSTEROSCOPY, WITH DILATION AND CURETTAGE OF UTERUS;  Surgeon: Ximena Thorne MD;  Location: [facility];  Service: OB/GYN;  Laterality: N/A;    LEG AMPUTATION      left  - right        Time Tracking:     OT Date of Treatment: 22  OT Start Time:   OT Stop  Time: 1100  OT Total Time (min): 14 min    Billable Minutes:Evaluation 14 minutes    3/30/2022

## 2022-03-29 NOTE — PROGRESS NOTES
"[facility]  Orthopedics  Progress Note    Patient Name: Pamella Levy  MRN: [id number]  Admission Date: 3/27/2022  Hospital Length of Stay: 1 days  Attending Provider: Brigida Nava MD  Primary Care Provider: Gwendolyn Swain NP    Subjective:     Principal Problem:Closed fracture of distal end of left femur    Principal Orthopedic Problem:  Closed fracture of distal end of left femur    Interval History: Pamella Levy is a 89-year-old female with a closed fracture of distal end of left femur in the presence of left BKA for vascular disease. 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 Patient is resting comfortably in bed.  No acute events overnight    Review of patient's allergies indicates:   Allergen Reactions    Pregabalin Swelling    Tramadol Hallucinations       Current Facility-Administered Medications   Medication    acetaminophen tablet 650 mg    albuterol-ipratropium 2.5 mg-0.5 mg/3 mL nebulizer solution 3 mL    aluminum-magnesium hydroxide-simethicone 200-200-20 mg/5 mL suspension 30 mL    atorvastatin tablet 20 mg    bisacodyL suppository 10 mg    carvediloL tablet 3.125 mg    gabapentin capsule 300 mg    hydrALAZINE injection 5 mg    HYDROcodone-acetaminophen 5-325 mg per tablet 1 tablet    HYDROmorphone (PF) injection 1 mg    naloxone 0.4 mg/mL injection 0.02 mg    ondansetron injection 4 mg    sodium chloride 0.9% flush 10 mL     Objective:     Vital Signs (Most Recent):  Temp: 98.3 °F (36.8 °C) (03/29/22 0436)  Pulse: 81 (03/29/22 0925)  Resp: 18 (03/29/22 0925)  BP: 136/62 (03/29/22 0753)  SpO2: 97 % (03/29/22 0925) Vital Signs (24h Range):  Temp:  [98.1 °F (36.7 °C)-99.1 °F (37.3 °C)] 98.3 °F (36.8 °C)  Pulse:  [70-94] 81  Resp:  [16-22] 18  SpO2:  [94 %-99 %] 97 %  BP: (131-210)/() 136/62     Weight: 42.7 kg (94 lb 2.2 oz)  Height: 5' 3" (160 cm)  Body mass index is 16.68 kg/m².      Intake/Output Summary (Last 24 hours) at 3/29/2022 1039  Last data filed at " 3/29/2022 0800  Gross per 24 hour   Intake 648.86 ml   Output 400 ml   Net 248.86 ml       Ortho/SPM Exam   Left knee:  Left BKA noted  Skin is intact  TTP at the fracture site  Patient has a flexion contracture  ROM is painful  Decreased sensation, which is her baseline    GEN: Well developed, well nourished female. AAOX3. No acute distress.   Normocephalic, atraumatic.   SAMARA  Breathing unlabored.  Mood and affect appropriate.      Significant Labs: All pertinent labs within the past 24 hours have been reviewed.    Significant Imaging: I have reviewed and interpreted all pertinent imaging results/findings.    Assessment/Plan:     Active Diagnoses:    Diagnosis Date Noted POA    PRINCIPAL PROBLEM:  Closed fracture of distal end of left femur [S72.402A] 03/28/2022 Yes    Hyperlipidemia [E78.5] 03/28/2022 Yes     Chronic    Dementia without behavioral disturbance [F03.90] 05/10/2021 Yes     Chronic    Chronic obstructive pulmonary disease [J44.9] 05/10/2021 Yes     Chronic    Primary hypertension [I10] 10/25/2019 Yes     Chronic      Problems Resolved During this Admission:     Assessment:  89-year-old female with closed fracture of distal end of left femur in the setting of a left BKA    Plan:  Patient and daughter have opted for non operative management  Patient is to remain nonweightbearing left lower extremity except for transfers  Physical therapy is going to fit the patient for a hinged knee brace  Patient will be discharged to rehab once pain control is satisfactory  Follow-up with Orth[facility]uma [facility] in 3-4 weeks    Nahum King PA-C  Orthopedics  [facility]    I was at bedside this morning when physical therapy was working with the patient for transfers.  An order for the hinged knee brace was placed today.  I again had a discussion with the daughter regarding the treatment plan.  The patient will continue to be managed non operatively and because she is nonambulatory we can let her weightbear  through transfers.      Patient is okay to discharge from an Orthopedics standpoint.  The patient's daughter expressed concern regarding her ability to take care of the mother once she is discharged from the hospital.  She may require placement in a skilled nursing facility or rehab facility if the daughter is unable to comfortably perform transfers for the patient in the next 6-8 weeks.    Emile Montgomery MD  Orthopedic Surgeon

## 2022-03-29 NOTE — CONSULTS
CM met with patient and daughter at bedside to discuss SNF options. CM provided skilled list, pending patient preference.

## 2022-03-30 ENCOUNTER — TELEPHONE (OUTPATIENT)
Dept: ORTHOPEDICS | Facility: CLINIC | Age: 87
End: 2022-03-30
Payer: MEDICARE

## 2022-03-30 DIAGNOSIS — M89.8X5 PAIN OF LEFT FEMUR: Primary | ICD-10-CM

## 2022-03-30 PROCEDURE — 25000003 PHARM REV CODE 250: Performed by: INTERNAL MEDICINE

## 2022-03-30 PROCEDURE — 11000001 HC ACUTE MED/SURG PRIVATE ROOM

## 2022-03-30 PROCEDURE — 63600175 PHARM REV CODE 636 W HCPCS: Performed by: INTERNAL MEDICINE

## 2022-03-30 PROCEDURE — 99231 SBSQ HOSP IP/OBS SF/LOW 25: CPT | Mod: ,,, | Performed by: PHYSICIAN ASSISTANT

## 2022-03-30 PROCEDURE — 99231 PR SUBSEQUENT HOSPITAL CARE,LEVL I: ICD-10-PCS | Mod: ,,, | Performed by: PHYSICIAN ASSISTANT

## 2022-03-30 PROCEDURE — 63600175 PHARM REV CODE 636 W HCPCS: Performed by: NURSE PRACTITIONER

## 2022-03-30 RX ADMIN — CARVEDILOL 3.12 MG: 3.12 TABLET, FILM COATED ORAL at 09:03

## 2022-03-30 RX ADMIN — HYDROCODONE BITARTRATE AND ACETAMINOPHEN 1 TABLET: 5; 325 TABLET ORAL at 04:03

## 2022-03-30 RX ADMIN — HYDRALAZINE HYDROCHLORIDE 5 MG: 20 INJECTION, SOLUTION INTRAMUSCULAR; INTRAVENOUS at 07:03

## 2022-03-30 RX ADMIN — ATORVASTATIN CALCIUM 20 MG: 10 TABLET, FILM COATED ORAL at 08:03

## 2022-03-30 RX ADMIN — HYDROMORPHONE HYDROCHLORIDE 1 MG: 2 INJECTION INTRAMUSCULAR; INTRAVENOUS; SUBCUTANEOUS at 07:03

## 2022-03-30 RX ADMIN — GABAPENTIN 300 MG: 300 CAPSULE ORAL at 08:03

## 2022-03-30 NOTE — PLAN OF CARE
CM spoke with patients daughter regarding SNF placement, per patients daughter, she is not currently here at the hospital and will be back around 2:00pm, she will be able to give me choices then.     After further review of patients chart, patient is not eligible for SNF and will need assisted placement. Cm sent blanket referral to facilities in the area. Cm to discuss assisted placement VS Home with HH with patient and patients daughter when she arrives later this evening.

## 2022-03-30 NOTE — ASSESSMENT & PLAN NOTE
- Knee immobilizer in place.   - Ortho Surgery consult. Will keep NPO.  - Analgesics as needed.   - Will order preop EKG, echo, CXR.    3/29/2022  Left knee area fracture being treated non surgically   PT/OT  Pain control   Henged Knee Brace       3/30/2022  Left knee area fracture being treated non surgically   PT/OT  Pain control   Henged Knee Brace

## 2022-03-30 NOTE — TELEPHONE ENCOUNTER
Spoke with patient's daughter and scheduled her postop appointment. Understanding verbalized of appointment date, time and location.

## 2022-03-30 NOTE — PROGRESS NOTES
"[facility]  Orthopedics  Progress Note    Patient Name: Pamella Levy  MRN: [id number]  Admission Date: 3/27/2022  Hospital Length of Stay: 2 days  Attending Provider: Brigida Nava MD  Primary Care Provider: Gwendolyn Swain NP    Subjective:     Principal Problem:Closed fracture of distal end of left femur    Principal Orthopedic Problem: Closed fracture of distal end of left femur    Interval History: Pamella Levy is a 89-year-old female with a closed fracture of distal end of left femur in the presence of left BKA for vascular disease. 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 Patient is resting comfortably in bed.  No acute events overnight. Patient asked that the hinged knee brace be removed because it was uncomfortable    Review of patient's allergies indicates:   Allergen Reactions    Pregabalin Swelling    Tramadol Hallucinations       Current Facility-Administered Medications   Medication    acetaminophen tablet 650 mg    albuterol-ipratropium 2.5 mg-0.5 mg/3 mL nebulizer solution 3 mL    aluminum-magnesium hydroxide-simethicone 200-200-20 mg/5 mL suspension 30 mL    atorvastatin tablet 20 mg    bisacodyL suppository 10 mg    carvediloL tablet 3.125 mg    gabapentin capsule 300 mg    hydrALAZINE injection 5 mg    HYDROcodone-acetaminophen 5-325 mg per tablet 1 tablet    HYDROmorphone (PF) injection 1 mg    naloxone 0.4 mg/mL injection 0.02 mg    ondansetron injection 4 mg    sodium chloride 0.9% flush 10 mL     Objective:     Vital Signs (Most Recent):  Temp: 98.6 °F (37 °C) (03/30/22 0816)  Pulse: 76 (03/30/22 0816)  Resp: 16 (03/30/22 0816)  BP: (!) 129/58 (03/30/22 0816)  SpO2: 96 % (03/30/22 0816) Vital Signs (24h Range):  Temp:  [98 °F (36.7 °C)-98.6 °F (37 °C)] 98.6 °F (37 °C)  Pulse:  [76-98] 76  Resp:  [16-20] 16  SpO2:  [96 %-98 %] 96 %  BP: (110-193)/(58-94) 129/58     Weight: 43 kg (94 lb 12.8 oz)  Height: 5' 3" (160 cm)  Body mass index is " 16.79 kg/m².      Intake/Output Summary (Last 24 hours) at 3/30/2022 1039  Last data filed at 3/29/2022 1800  Gross per 24 hour   Intake 540 ml   Output 200 ml   Net 340 ml       Ortho/SPM Exam   Left knee:  Left BKA noted  Skin is intact  TTP at the fracture site  Patient has a flexion contracture  ROM is painful  Decreased sensation, which is her baseline     GEN: Well developed, well nourished female. AAOX3. No acute distress.   Normocephalic, atraumatic.   SAMARA  Breathing unlabored.  Mood and affect appropriate.      Significant Labs:   CBC:   Recent Labs   Lab 03/29/22  0445   WBC 5.21   HGB 10.3*   HCT 32.7*          Significant Imaging: None    Assessment/Plan:     Active Diagnoses:    Diagnosis Date Noted POA    PRINCIPAL PROBLEM:  Closed fracture of distal end of left femur [S72.402A] 03/28/2022 Yes    Hyperlipidemia [E78.5] 03/28/2022 Yes     Chronic    Dementia without behavioral disturbance [F03.90] 05/10/2021 Yes     Chronic    Chronic obstructive pulmonary disease [J44.9] 05/10/2021 Yes     Chronic    Primary hypertension [I10] 10/25/2019 Yes     Chronic      Problems Resolved During this Admission:     Assessment:  89-year-old female with closed fracture of distal end of left femur in the setting of a left BKA    Plan:  Patient and daughter have opted for non operative management  Patient is to remain nonweightbearing left lower extremity except for transfers  Patient may use hinged knee brace for comfort if desired  Patient will be discharged to rehab once pain control is satisfactory  Follow-up with Ortho Trauma Clinic in 3-4 weeks    Nahum King PA-C  Orthopedics  [facility]    Agree with the above findings.  Patient will still be managed non operatively.  The hinged knee brace is for comfort and to allow for transfers.  No orthopedic surgical intervention required this time.    Emile Montgomery MD  Orthopedic Surgeon

## 2022-03-30 NOTE — PROGRESS NOTES
[facility] Medicine  Progress Note    Patient Name: Pamella Levy  MRN: [id number]  Patient Class: IP- Inpatient   Admission Date: 3/27/2022  Length of Stay: 2 days  Attending Physician: Brigida Nava MD  Primary Care Provider: Gwendolyn Swain NP        Subjective:     Principal Problem:Closed fracture of distal end of left femur        HPI:  Ms. Levy is a 89 yo female with a PMHx of COPD, CKD, dementia, iron deficiency anemia, HTN, HLD, and PVD s/p BKA who presented to [facility] ED in [facility] with complaints of left knee pain that started after suffering a fall PTA. Patient is wheelchair-bound and states she reached for her medications causing her to fall to the ground on her left lower extremity. She is reporting the pain as constant and severe. No aggravating or alleviating factors. Denies any other injury. Denies hitting her head or loss of consciousness. Denies any weakness, numbness/tingling, CP, SOB, ABD pain, N/V, back pain, HA, lightheadedness, dizziness, syncope, fever or chills. Work-up in the ED revealed left distal femur fracture. Case was discussed with Ortho Surgery per the ED, who recommended knee immobilizer and will see patient in consult. Patient was transferred to [facility] and admitted under Hospital Medicine services.   Patient is a Full Code.       Overview/Hospital Course:  Ms Levy is a 89 year old female who presented to [facility] for evaluation of left knee pain after experiencing a fall. She has left BKA. CT of left knee showed comminuted impacted diametaphyseal fracture. Orthopedic Surgery consulted. Patient was evaluated by Dr Montgomery, she will be managed non surgically, since she is non ambulatory, can let her weightbear through transfers. Awaiting placement. As of 3/30/2022, vital signs stable, no distress, reports left knee pain starting to decrease. PT/OT following. Awaiting placement.       Interval History: Awaiting placement     Review of Systems   Constitutional:   Negative for chills and fever.   HENT:  Positive for hearing loss. Negative for congestion, rhinorrhea and sinus pressure.    Respiratory:  Negative for apnea, cough, choking, chest tightness, shortness of breath, wheezing and stridor.    Cardiovascular:  Negative for chest pain, palpitations and leg swelling.   Gastrointestinal:  Negative for abdominal distention, abdominal pain, diarrhea, nausea and vomiting.   Endocrine: Negative for cold intolerance and heat intolerance.   Genitourinary:  Negative for difficulty urinating and hematuria.   Musculoskeletal:  Negative for arthralgias and joint swelling.        Left Knee pain and swelling    Skin:  Negative for color change, pallor and rash.   Neurological:  Negative for dizziness, seizures, weakness, numbness and headaches.   Psychiatric/Behavioral:  Negative for agitation. The patient is not nervous/anxious.    Objective:     Vital Signs (Most Recent):  Temp: 97.7 °F (36.5 °C) (03/30/22 1237)  Pulse: 83 (03/30/22 1237)  Resp: 16 (03/30/22 1237)  BP: (!) 131/56 (03/30/22 1237)  SpO2: 96 % (03/30/22 1237)   Vital Signs (24h Range):  Temp:  [97.7 °F (36.5 °C)-98.6 °F (37 °C)] 97.7 °F (36.5 °C)  Pulse:  [76-98] 83  Resp:  [16-20] 16  SpO2:  [96 %-98 %] 96 %  BP: (110-193)/(56-94) 131/56     Weight: 43 kg (94 lb 12.8 oz)  Body mass index is 16.79 kg/m².    Intake/Output Summary (Last 24 hours) at 3/30/2022 1417  Last data filed at 3/29/2022 1800  Gross per 24 hour   Intake 240 ml   Output 200 ml   Net 40 ml      Physical Exam  HENT:      Head: Normocephalic and atraumatic.   Cardiovascular:      Heart sounds: Murmur heard.     No friction rub. No gallop.   Pulmonary:      Effort: No respiratory distress.      Breath sounds: No stridor. No wheezing, rhonchi or rales.   Chest:      Chest wall: No tenderness.   Abdominal:      General: There is no distension.      Palpations: There is no mass.      Tenderness: There is no abdominal tenderness. There is no right CVA  tenderness, left CVA tenderness, guarding or rebound.      Hernia: No hernia is present.   Musculoskeletal:         General: Swelling (Left knee and stump) present. No tenderness, deformity or signs of injury.      Right lower leg: No edema.      Left lower leg: No edema.   Skin:     Coloration: Skin is not jaundiced or pale.      Findings: No bruising, erythema, lesion or rash.   Neurological:      Cranial Nerves: No cranial nerve deficit.      Sensory: No sensory deficit.      Motor: No weakness.      Coordination: Coordination normal.      Deep Tendon Reflexes: Reflexes normal.       Significant Labs: All pertinent labs within the past 24 hours have been reviewed.  CBC:   Recent Labs   Lab 03/29/22  0445   WBC 5.21   HGB 10.3*   HCT 32.7*            Significant Imaging:     Imaging Results              X-Ray Knee 3 View Left (Final result)  Result time 03/28/22 07:59:34      Final result by John Cam MD (03/28/22 07:59:34)                   Impression:      Transverse fracture through the distal femoral diametaphysis.      Electronically signed by: John Cam MD  Date:    03/28/2022  Time:    07:59               Narrative:    EXAMINATION:  XR KNEE 3 VIEW LEFT    CLINICAL HISTORY:  Pain in left leg    TECHNIQUE:  AP, lateral, and Merchant views of the left knee were performed.    COMPARISON:  None    FINDINGS:  Osteopenia.  Transverse fracture through the distal femoral diametaphysis with angular deformity.  Below the knee amputation.                        ED Interpretation by John Dodge MD (03/28/22 00:29:51, [facility], Emergency Medicine)    Distal femur fracture                                     X-Ray Femur AP/LAT Left (Final result)  Result time 03/28/22 08:00:31      Final result by John Cam MD (03/28/22 08:00:31)                   Impression:      As above.      Electronically signed by: John Cam MD  Date:    03/28/2022  Time:    08:00               Narrative:     EXAMINATION:  XR FEMUR 2 VIEW LEFT    CLINICAL HISTORY:  Pain in left leg    TECHNIQUE:  AP and lateral views of the left femur were performed.    COMPARISON:  None}    FINDINGS:  Osteopenia.  Impacted distal femoral transverse fracture of the diametaphysis.                                       X-Ray Pelvis Routine AP (Final result)  Result time 03/28/22 08:01:15      Final result by John Cam MD (03/28/22 08:01:15)                   Impression:      As above.      Electronically signed by: John Cam MD  Date:    03/28/2022  Time:    08:01               Narrative:    EXAMINATION:  XR PELVIS ROUTINE AP    CLINICAL HISTORY:  Unspecified fall, initial encounter    TECHNIQUE:  AP view of the pelvis was performed.    COMPARISON:  None.    FINDINGS:  Lumbar scoliotic curvature convex left.  Multilevel degenerative changes.  Osteopenia.  No acute osseous abnormality.                                         Assessment/Plan:      * Closed fracture of distal end of left femur  - Knee immobilizer in place.   - Ortho Surgery consult. Will keep NPO.  - Analgesics as needed.   - Will order preop EKG, echo, CXR.    3/29/2022  Left knee area fracture being treated non surgically   PT/OT  Pain control   Henged Knee Brace       3/30/2022  Left knee area fracture being treated non surgically   PT/OT  Pain control   Henged Knee Brace     Hyperlipidemia  - Continue home statin.     Dementia without behavioral disturbance  - Monitor for delirium.     Chronic obstructive pulmonary disease  - Bronchodilators and O2 supplementation as needed.     Primary hypertension  - Continue home antihypertensives.       VTE Risk Mitigation (From admission, onward)         Ordered     Reason for No Pharmacological VTE Prophylaxis  Once        Question:  Reasons:  Answer:  Risk of Bleeding    03/28/22 0505     IP VTE HIGH RISK PATIENT  Once         03/28/22 0505     Place sequential compression device  Until discontinued         03/28/22 0505                 Discharge Planning   BEATRIS:      Code Status: Full Code   Is the patient medically ready for discharge?:     Reason for patient still in hospital (select all that apply): Patient trending condition and Treatment  Discharge Plan A: Nursing Home                  Anders Levy NP  Department of Hospital Medicine   [facility]

## 2022-03-30 NOTE — TELEPHONE ENCOUNTER
----- Message from Nahum King PA-C sent at 3/30/2022 11:06 AM CDT -----  This patient will need a follow-up in Ortho Trauma Clinic in 3-4 weeks

## 2022-03-30 NOTE — PLAN OF CARE
Problem: Skin Injury Risk Increased  Goal: Skin Health and Integrity  Outcome: Ongoing, Progressing     Problem: Fall Injury Risk  Goal: Absence of Fall and Fall-Related Injury  Outcome: Ongoing, Progressing     Problem: Adult Inpatient Plan of Care  Goal: Plan of Care Review  Outcome: Ongoing, Progressing  Goal: Patient-Specific Goal (Individualized)  Outcome: Ongoing, Progressing  Goal: Absence of Hospital-Acquired Illness or Injury  Outcome: Ongoing, Progressing  Goal: Optimal Comfort and Wellbeing  Outcome: Ongoing, Progressing  Goal: Readiness for Transition of Care  Outcome: Ongoing, Progressing

## 2022-03-30 NOTE — SUBJECTIVE & OBJECTIVE
Interval History: Awaiting SNF placement     Review of Systems   Constitutional:  Negative for chills and fever.   HENT:  Positive for hearing loss. Negative for congestion, rhinorrhea and sinus pressure.    Respiratory:  Negative for apnea, cough, choking, chest tightness, shortness of breath, wheezing and stridor.    Cardiovascular:  Negative for chest pain, palpitations and leg swelling.   Gastrointestinal:  Negative for abdominal distention, abdominal pain, diarrhea, nausea and vomiting.   Endocrine: Negative for cold intolerance and heat intolerance.   Genitourinary:  Negative for difficulty urinating and hematuria.   Musculoskeletal:  Negative for arthralgias and joint swelling.        Left Knee pain and swelling    Skin:  Negative for color change, pallor and rash.   Neurological:  Negative for dizziness, seizures, weakness, numbness and headaches.   Psychiatric/Behavioral:  Negative for agitation. The patient is not nervous/anxious.    Objective:     Vital Signs (Most Recent):  Temp: 97.7 °F (36.5 °C) (03/30/22 1237)  Pulse: 83 (03/30/22 1237)  Resp: 16 (03/30/22 1237)  BP: (!) 131/56 (03/30/22 1237)  SpO2: 96 % (03/30/22 1237)   Vital Signs (24h Range):  Temp:  [97.7 °F (36.5 °C)-98.6 °F (37 °C)] 97.7 °F (36.5 °C)  Pulse:  [76-98] 83  Resp:  [16-20] 16  SpO2:  [96 %-98 %] 96 %  BP: (110-193)/(56-94) 131/56     Weight: 43 kg (94 lb 12.8 oz)  Body mass index is 16.79 kg/m².    Intake/Output Summary (Last 24 hours) at 3/30/2022 1417  Last data filed at 3/29/2022 1800  Gross per 24 hour   Intake 240 ml   Output 200 ml   Net 40 ml      Physical Exam  HENT:      Head: Normocephalic and atraumatic.   Cardiovascular:      Heart sounds: Murmur heard.     No friction rub. No gallop.   Pulmonary:      Effort: No respiratory distress.      Breath sounds: No stridor. No wheezing, rhonchi or rales.   Chest:      Chest wall: No tenderness.   Abdominal:      General: There is no distension.      Palpations: There is no  mass.      Tenderness: There is no abdominal tenderness. There is no right CVA tenderness, left CVA tenderness, guarding or rebound.      Hernia: No hernia is present.   Musculoskeletal:         General: Swelling (Left knee and stump) present. No tenderness, deformity or signs of injury.      Right lower leg: No edema.      Left lower leg: No edema.   Skin:     Coloration: Skin is not jaundiced or pale.      Findings: No bruising, erythema, lesion or rash.   Neurological:      Cranial Nerves: No cranial nerve deficit.      Sensory: No sensory deficit.      Motor: No weakness.      Coordination: Coordination normal.      Deep Tendon Reflexes: Reflexes normal.       Significant Labs: All pertinent labs within the past 24 hours have been reviewed.  CBC:   Recent Labs   Lab 03/29/22  0445   WBC 5.21   HGB 10.3*   HCT 32.7*            Significant Imaging:     Imaging Results              X-Ray Knee 3 View Left (Final result)  Result time 03/28/22 07:59:34      Final result by John Cam MD (03/28/22 07:59:34)                   Impression:      Transverse fracture through the distal femoral diametaphysis.      Electronically signed by: John Cam MD  Date:    03/28/2022  Time:    07:59               Narrative:    EXAMINATION:  XR KNEE 3 VIEW LEFT    CLINICAL HISTORY:  Pain in left leg    TECHNIQUE:  AP, lateral, and Merchant views of the left knee were performed.    COMPARISON:  None    FINDINGS:  Osteopenia.  Transverse fracture through the distal femoral diametaphysis with angular deformity.  Below the knee amputation.                        ED Interpretation by John Dodge MD (03/28/22 00:29:51, [facility] Emergency Dept, Emergency Medicine)    Distal femur fracture                                     X-Ray Femur AP/LAT Left (Final result)  Result time 03/28/22 08:00:31      Final result by John Cam MD (03/28/22 08:00:31)                   Impression:      As above.      Electronically signed  by: John Cam MD  Date:    03/28/2022  Time:    08:00               Narrative:    EXAMINATION:  XR FEMUR 2 VIEW LEFT    CLINICAL HISTORY:  Pain in left leg    TECHNIQUE:  AP and lateral views of the left femur were performed.    COMPARISON:  None}    FINDINGS:  Osteopenia.  Impacted distal femoral transverse fracture of the diametaphysis.                                       X-Ray Pelvis Routine AP (Final result)  Result time 03/28/22 08:01:15      Final result by John Cam MD (03/28/22 08:01:15)                   Impression:      As above.      Electronically signed by: John Cam MD  Date:    03/28/2022  Time:    08:01               Narrative:    EXAMINATION:  XR PELVIS ROUTINE AP    CLINICAL HISTORY:  Unspecified fall, initial encounter    TECHNIQUE:  AP view of the pelvis was performed.    COMPARISON:  None.    FINDINGS:  Lumbar scoliotic curvature convex left.  Multilevel degenerative changes.  Osteopenia.  No acute osseous abnormality.

## 2022-03-31 PROCEDURE — 25000003 PHARM REV CODE 250: Performed by: INTERNAL MEDICINE

## 2022-03-31 PROCEDURE — 63600175 PHARM REV CODE 636 W HCPCS: Performed by: NURSE PRACTITIONER

## 2022-03-31 PROCEDURE — 11000001 HC ACUTE MED/SURG PRIVATE ROOM

## 2022-03-31 PROCEDURE — 63600175 PHARM REV CODE 636 W HCPCS: Performed by: INTERNAL MEDICINE

## 2022-03-31 RX ADMIN — BISACODYL 10 MG: 10 SUPPOSITORY RECTAL at 04:03

## 2022-03-31 RX ADMIN — HYDRALAZINE HYDROCHLORIDE 5 MG: 20 INJECTION, SOLUTION INTRAMUSCULAR; INTRAVENOUS at 07:03

## 2022-03-31 RX ADMIN — HYDROCODONE BITARTRATE AND ACETAMINOPHEN 1 TABLET: 5; 325 TABLET ORAL at 09:03

## 2022-03-31 RX ADMIN — CARVEDILOL 3.12 MG: 3.12 TABLET, FILM COATED ORAL at 08:03

## 2022-03-31 RX ADMIN — CARVEDILOL 3.12 MG: 3.12 TABLET, FILM COATED ORAL at 09:03

## 2022-03-31 RX ADMIN — HYDROMORPHONE HYDROCHLORIDE 1 MG: 2 INJECTION INTRAMUSCULAR; INTRAVENOUS; SUBCUTANEOUS at 10:03

## 2022-03-31 RX ADMIN — GABAPENTIN 300 MG: 300 CAPSULE ORAL at 08:03

## 2022-03-31 RX ADMIN — ATORVASTATIN CALCIUM 20 MG: 10 TABLET, FILM COATED ORAL at 08:03

## 2022-03-31 NOTE — PROGRESS NOTES
[facility] Medicine  Progress Note    Patient Name: Pamella Levy  MRN: [id number]  Patient Class: IP- Inpatient   Admission Date: 3/27/2022  Length of Stay: 3 days  Attending Physician: Brigida Nava MD  Primary Care Provider: Gwendolyn Swain NP        Subjective:     Principal Problem:Closed fracture of distal end of left femur        HPI:  Ms. Levy is a 89 yo female with a PMHx of COPD, CKD, dementia, iron deficiency anemia, HTN, HLD, and PVD s/p BKA who presented to [facility] ED in [facility] with complaints of left knee pain that started after suffering a fall PTA. Patient is wheelchair-bound and states she reached for her medications causing her to fall to the ground on her left lower extremity. She is reporting the pain as constant and severe. No aggravating or alleviating factors. Denies any other injury. Denies hitting her head or loss of consciousness. Denies any weakness, numbness/tingling, CP, SOB, ABD pain, N/V, back pain, HA, lightheadedness, dizziness, syncope, fever or chills. Work-up in the ED revealed left distal femur fracture. Case was discussed with Ortho Surgery per the ED, who recommended knee immobilizer and will see patient in consult. Patient was transferred to [facility] and admitted under Hospital Medicine services.   Patient is a Full Code.       Overview/Hospital Course:  Ms Levy is a 89 year old female who presented to [facility] for evaluation of left knee pain after experiencing a fall. She has left BKA. CT of left knee showed comminuted impacted diametaphyseal fracture. Orthopedic Surgery consulted. Patient was evaluated by Dr Montgomery, she will be managed non surgically, since she is non ambulatory, can let her weightbear through transfers. Awaiting SNF placement. As of 3/30/2022, vital signs stable, no distress, reports left knee pain starting to decrease. PT/OT following. Awaiting placement.   As 3/31/2022, PT/OT recommend [facility]. Case reviewed with case  managemet, patient possible accepted to [facility] . Daughter does not want patient to go to permanent Basic Nursing Facility, however there are safety concerns about discharging home. (Daughter and patient possible living in hotel before admission).      Interval History: Awaiting placement vs D/C home.  Hinged Knee brace at bedside, patient not wearing because its painful.     Review of Systems   Constitutional:  Negative for chills and fever.   HENT:  Positive for hearing loss. Negative for congestion, rhinorrhea and sinus pressure.    Respiratory:  Negative for apnea, cough, choking, chest tightness, shortness of breath, wheezing and stridor.    Cardiovascular:  Negative for chest pain, palpitations and leg swelling.   Gastrointestinal:  Negative for abdominal distention, abdominal pain, diarrhea, nausea and vomiting.   Endocrine: Negative for cold intolerance and heat intolerance.   Genitourinary:  Negative for difficulty urinating and hematuria.   Musculoskeletal:  Negative for arthralgias and joint swelling.        Left Knee pain and swelling    Skin:  Negative for color change, pallor and rash.   Neurological:  Negative for dizziness, seizures, weakness, numbness and headaches.   Psychiatric/Behavioral:  Negative for agitation. The patient is not nervous/anxious.    Objective:     Vital Signs (Most Recent):  Temp: 98 °F (36.7 °C) (03/31/22 1555)  Pulse: 72 (03/31/22 1555)  Resp: 19 (03/31/22 1555)  BP: (!) 147/67 (03/31/22 1555)  SpO2: 97 % (03/31/22 1555)   Vital Signs (24h Range):  Temp:  [96.8 °F (36 °C)-99.4 °F (37.4 °C)] 98 °F (36.7 °C)  Pulse:  [] 72  Resp:  [16-20] 19  SpO2:  [95 %-98 %] 97 %  BP: (126-217)/(57-92) 147/67     Weight: 44.6 kg (98 lb 5.2 oz)  Body mass index is 17.42 kg/m².    Intake/Output Summary (Last 24 hours) at 3/31/2022 1556  Last data filed at 3/31/2022 1500  Gross per 24 hour   Intake 1170 ml   Output 375 ml   Net 795 ml      Physical Exam  Vitals and nursing  note reviewed.   HENT:      Head: Normocephalic and atraumatic.   Cardiovascular:      Heart sounds: Murmur heard.     No friction rub. No gallop.   Pulmonary:      Effort: No respiratory distress.      Breath sounds: No stridor. No wheezing, rhonchi or rales.   Chest:      Chest wall: No tenderness.   Abdominal:      General: There is no distension.      Palpations: There is no mass.      Tenderness: There is no abdominal tenderness. There is no right CVA tenderness, left CVA tenderness, guarding or rebound.      Hernia: No hernia is present.   Musculoskeletal:         General: Tenderness (Left knee and stump) present. No swelling, deformity or signs of injury.      Right lower leg: No edema.      Left lower leg: No edema.   Skin:     Coloration: Skin is not jaundiced or pale.      Findings: No bruising, erythema, lesion or rash.   Neurological:      Cranial Nerves: No cranial nerve deficit.      Sensory: No sensory deficit.      Motor: No weakness.      Coordination: Coordination normal.      Deep Tendon Reflexes: Reflexes normal.       Significant Imaging:     Imaging Results              X-Ray Knee 3 View Left (Final result)  Result time 03/28/22 07:59:34      Final result by John Cam MD (03/28/22 07:59:34)                   Impression:      Transverse fracture through the distal femoral diametaphysis.      Electronically signed by: John Cam MD  Date:    03/28/2022  Time:    07:59               Narrative:    EXAMINATION:  XR KNEE 3 VIEW LEFT    CLINICAL HISTORY:  Pain in left leg    TECHNIQUE:  AP, lateral, and Merchant views of the left knee were performed.    COMPARISON:  None    FINDINGS:  Osteopenia.  Transverse fracture through the distal femoral diametaphysis with angular deformity.  Below the knee amputation.                        ED Interpretation by John Dodge MD (03/28/22 00:29:51, [facility], Emergency Medicine)    Distal femur fracture                                      X-Ray Femur AP/LAT Left (Final result)  Result time 03/28/22 08:00:31      Final result by John Cam MD (03/28/22 08:00:31)                   Impression:      As above.      Electronically signed by: John Cam MD  Date:    03/28/2022  Time:    08:00               Narrative:    EXAMINATION:  XR FEMUR 2 VIEW LEFT    CLINICAL HISTORY:  Pain in left leg    TECHNIQUE:  AP and lateral views of the left femur were performed.    COMPARISON:  None}    FINDINGS:  Osteopenia.  Impacted distal femoral transverse fracture of the diametaphysis.                                       X-Ray Pelvis Routine AP (Final result)  Result time 03/28/22 08:01:15      Final result by John Cam MD (03/28/22 08:01:15)                   Impression:      As above.      Electronically signed by: John Cam MD  Date:    03/28/2022  Time:    08:01               Narrative:    EXAMINATION:  XR PELVIS ROUTINE AP    CLINICAL HISTORY:  Unspecified fall, initial encounter    TECHNIQUE:  AP view of the pelvis was performed.    COMPARISON:  None.    FINDINGS:  Lumbar scoliotic curvature convex left.  Multilevel degenerative changes.  Osteopenia.  No acute osseous abnormality.                                         Assessment/Plan:      * Closed fracture of distal end of left femur  - Knee immobilizer in place.   - Ortho Surgery consult. Will keep NPO.  - Analgesics as needed.   - Will order preop EKG, echo, CXR.    3/29/2022  Left knee area fracture being treated non surgically   PT/OT  Pain control   Henged Knee Brace       3/30/2022  Left knee area fracture being treated non surgically   PT/OT  Pain control   Henged Knee Brace       3/31/2022  Left knee area fracture being treated non surgically   PT/OT  Pain control   Henged Knee Brace   D/C soon to Basic Nursing Facility or Home     Hyperlipidemia  - Continue home statin.     Dementia without behavioral disturbance  - Monitor for delirium.     Chronic obstructive pulmonary disease  -  Bronchodilators and O2 supplementation as needed.     Primary hypertension  - Continue home antihypertensives.     3/31/2022  BP stable       VTE Risk Mitigation (From admission, onward)         Ordered     Reason for No Pharmacological VTE Prophylaxis  Once        Question:  Reasons:  Answer:  Risk of Bleeding    03/28/22 0505     IP VTE HIGH RISK PATIENT  Once         03/28/22 0505     Place sequential compression device  Until discontinued         03/28/22 0505                Discharge Planning   BEATRIS:      Code Status: Full Code   Is the patient medically ready for discharge?:     Reason for patient still in hospital (select all that apply): Patient trending condition and Treatment  Discharge Plan A: Basic Nursing Facility   Discharge Plan B: Home with Home Health                   Anders Levy NP  Department of Hospital Medicine   [facility] - Med Surg

## 2022-03-31 NOTE — SUBJECTIVE & OBJECTIVE
Interval History: Awaiting placement vs D/C home.  Hinged Knee brace at bedside, patient not wearing because its painful.     Review of Systems   Constitutional:  Negative for chills and fever.   HENT:  Positive for hearing loss. Negative for congestion, rhinorrhea and sinus pressure.    Respiratory:  Negative for apnea, cough, choking, chest tightness, shortness of breath, wheezing and stridor.    Cardiovascular:  Negative for chest pain, palpitations and leg swelling.   Gastrointestinal:  Negative for abdominal distention, abdominal pain, diarrhea, nausea and vomiting.   Endocrine: Negative for cold intolerance and heat intolerance.   Genitourinary:  Negative for difficulty urinating and hematuria.   Musculoskeletal:  Negative for arthralgias and joint swelling.        Left Knee pain and swelling    Skin:  Negative for color change, pallor and rash.   Neurological:  Negative for dizziness, seizures, weakness, numbness and headaches.   Psychiatric/Behavioral:  Negative for agitation. The patient is not nervous/anxious.    Objective:     Vital Signs (Most Recent):  Temp: 98 °F (36.7 °C) (03/31/22 1555)  Pulse: 72 (03/31/22 1555)  Resp: 19 (03/31/22 1555)  BP: (!) 147/67 (03/31/22 1555)  SpO2: 97 % (03/31/22 1555)   Vital Signs (24h Range):  Temp:  [96.8 °F (36 °C)-99.4 °F (37.4 °C)] 98 °F (36.7 °C)  Pulse:  [] 72  Resp:  [16-20] 19  SpO2:  [95 %-98 %] 97 %  BP: (126-217)/(57-92) 147/67     Weight: 44.6 kg (98 lb 5.2 oz)  Body mass index is 17.42 kg/m².    Intake/Output Summary (Last 24 hours) at 3/31/2022 1556  Last data filed at 3/31/2022 1500  Gross per 24 hour   Intake 1170 ml   Output 375 ml   Net 795 ml      Physical Exam  Vitals and nursing note reviewed.   HENT:      Head: Normocephalic and atraumatic.   Cardiovascular:      Heart sounds: Murmur heard.     No friction rub. No gallop.   Pulmonary:      Effort: No respiratory distress.      Breath sounds: No stridor. No wheezing, rhonchi or rales.    Chest:      Chest wall: No tenderness.   Abdominal:      General: There is no distension.      Palpations: There is no mass.      Tenderness: There is no abdominal tenderness. There is no right CVA tenderness, left CVA tenderness, guarding or rebound.      Hernia: No hernia is present.   Musculoskeletal:         General: Tenderness (Left knee and stump) present. No swelling, deformity or signs of injury.      Right lower leg: No edema.      Left lower leg: No edema.   Skin:     Coloration: Skin is not jaundiced or pale.      Findings: No bruising, erythema, lesion or rash.   Neurological:      Cranial Nerves: No cranial nerve deficit.      Sensory: No sensory deficit.      Motor: No weakness.      Coordination: Coordination normal.      Deep Tendon Reflexes: Reflexes normal.       Significant Imaging:     Imaging Results              X-Ray Knee 3 View Left (Final result)  Result time 03/28/22 07:59:34      Final result by John Cam MD (03/28/22 07:59:34)                   Impression:      Transverse fracture through the distal femoral diametaphysis.      Electronically signed by: John Cam MD  Date:    03/28/2022  Time:    07:59               Narrative:    EXAMINATION:  XR KNEE 3 VIEW LEFT    CLINICAL HISTORY:  Pain in left leg    TECHNIQUE:  AP, lateral, and Merchant views of the left knee were performed.    COMPARISON:  None    FINDINGS:  Osteopenia.  Transverse fracture through the distal femoral diametaphysis with angular deformity.  Below the knee amputation.                        ED Interpretation by John Dodge MD (03/28/22 00:29:51, [facility] Emergency Dept, Emergency Medicine)    Distal femur fracture                                     X-Ray Femur AP/LAT Left (Final result)  Result time 03/28/22 08:00:31      Final result by John Cam MD (03/28/22 08:00:31)                   Impression:      As above.      Electronically signed by: John Cam MD  Date:    03/28/2022  Time:    08:00                Narrative:    EXAMINATION:  XR FEMUR 2 VIEW LEFT    CLINICAL HISTORY:  Pain in left leg    TECHNIQUE:  AP and lateral views of the left femur were performed.    COMPARISON:  None}    FINDINGS:  Osteopenia.  Impacted distal femoral transverse fracture of the diametaphysis.                                       X-Ray Pelvis Routine AP (Final result)  Result time 03/28/22 08:01:15      Final result by John Cam MD (03/28/22 08:01:15)                   Impression:      As above.      Electronically signed by: John Cam MD  Date:    03/28/2022  Time:    08:01               Narrative:    EXAMINATION:  XR PELVIS ROUTINE AP    CLINICAL HISTORY:  Unspecified fall, initial encounter    TECHNIQUE:  AP view of the pelvis was performed.    COMPARISON:  None.    FINDINGS:  Lumbar scoliotic curvature convex left.  Multilevel degenerative changes.  Osteopenia.  No acute osseous abnormality.

## 2022-03-31 NOTE — ASSESSMENT & PLAN NOTE
- Knee immobilizer in place.   - Ortho Surgery consult. Will keep NPO.  - Analgesics as needed.   - Will order preop EKG, echo, CXR.    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

## 2022-03-31 NOTE — PLAN OF CARE
CM spoke with patients daughter at the bedside, discussed long term placement options. Informed that patient is currently accepted by [facility] and [facility]. Patient daughter choose [facility]. Cm informed Reshawn of [facility] of pts choice. Facility to reach out to family for next steps.

## 2022-04-01 ENCOUNTER — OUTPATIENT CASE MANAGEMENT (OUTPATIENT)
Dept: ADMINISTRATIVE | Facility: OTHER | Age: 87
End: 2022-04-01
Payer: MEDICARE

## 2022-04-01 PROCEDURE — 63600175 PHARM REV CODE 636 W HCPCS: Performed by: INTERNAL MEDICINE

## 2022-04-01 PROCEDURE — 11000001 HC ACUTE MED/SURG PRIVATE ROOM

## 2022-04-01 PROCEDURE — 25000003 PHARM REV CODE 250: Performed by: INTERNAL MEDICINE

## 2022-04-01 RX ADMIN — HYDROCODONE BITARTRATE AND ACETAMINOPHEN 1 TABLET: 5; 325 TABLET ORAL at 08:04

## 2022-04-01 RX ADMIN — CARVEDILOL 3.12 MG: 3.12 TABLET, FILM COATED ORAL at 08:04

## 2022-04-01 RX ADMIN — ATORVASTATIN CALCIUM 20 MG: 10 TABLET, FILM COATED ORAL at 08:04

## 2022-04-01 RX ADMIN — HYDROCODONE BITARTRATE AND ACETAMINOPHEN 1 TABLET: 5; 325 TABLET ORAL at 02:04

## 2022-04-01 RX ADMIN — GABAPENTIN 300 MG: 300 CAPSULE ORAL at 08:04

## 2022-04-01 RX ADMIN — HYDROMORPHONE HYDROCHLORIDE 1 MG: 2 INJECTION INTRAMUSCULAR; INTRAVENOUS; SUBCUTANEOUS at 05:04

## 2022-04-01 RX ADMIN — CARVEDILOL 3.12 MG: 3.12 TABLET, FILM COATED ORAL at 10:04

## 2022-04-01 NOTE — PLAN OF CARE
Problem: Adult Inpatient Plan of Care  Goal: Plan of Care Review  Outcome: Ongoing, Progressing     Problem: Adult Inpatient Plan of Care  Goal: Absence of Hospital-Acquired Illness or Injury  Outcome: Ongoing, Progressing     Problem: Adult Inpatient Plan of Care  Goal: Optimal Comfort and Wellbeing  Outcome: Ongoing, Progressing       Pt remained free of injury. Call bell and personal items within reach. BP controlled with IV med. Pain noted with movement. Medication offered, but refused. Chart check complete. Will continue to monitor.

## 2022-04-01 NOTE — ASSESSMENT & PLAN NOTE
- Knee immobilizer in place.   - Ortho Surgery consult. Will keep NPO.  - Analgesics as needed.   - Will order preop EKG, echo, CXR.    3/29/2022  Left knee area fracture being treated non surgically   PT/OT  Pain control   Henged Knee Brace       3/30/2022  Left knee area fracture being treated non surgically   PT/OT  Pain control   Henged Knee Brace       3/31/2022  Left knee area fracture being treated non surgically   PT/OT  Pain control   Henged Knee Brace   D/C soon to Basic Nursing Facility or Home     4/1/22  -Patient will discharge home with son on Monday   -Pain control

## 2022-04-01 NOTE — PLAN OF CARE
CCM spoke with patients daughter to FU on DC plan. Per patient daughter patient is going to DC home to live with her son Reynaldo Levy ([phone]). CM obtained contact info and address for Reynaldo, ([address] [place] [address]).    CM spoke with Reynaldo via phone, explained role and Nature of call. Discussed plan for patient to dc to his home. Per Reynaldo he is ok with his mom coming to his home. Per Reynaldo he works out of town and wont be back until Monday to open his home. Cm notified provider via secure chat to determine DC plan for patient.

## 2022-04-01 NOTE — SUBJECTIVE & OBJECTIVE
Review of Systems   Constitutional:  Negative for chills and fever.   HENT:  Positive for hearing loss. Negative for congestion, rhinorrhea and sinus pressure.    Respiratory:  Negative for apnea, cough, choking, chest tightness, shortness of breath, wheezing and stridor.    Cardiovascular:  Negative for chest pain, palpitations and leg swelling.   Gastrointestinal:  Negative for abdominal distention, abdominal pain, diarrhea, nausea and vomiting.   Endocrine: Negative for cold intolerance and heat intolerance.   Genitourinary:  Negative for difficulty urinating and hematuria.   Musculoskeletal:  Negative for arthralgias and joint swelling.        Left Knee pain and swelling    Skin:  Negative for color change, pallor and rash.   Neurological:  Negative for dizziness, seizures, weakness, numbness and headaches.   Psychiatric/Behavioral:  Negative for agitation. The patient is not nervous/anxious.    Objective:     Vital Signs (Most Recent):  Temp: 99.2 °F (37.3 °C) (04/01/22 1229)  Pulse: 83 (04/01/22 1229)  Resp: 18 (04/01/22 1411)  BP: (!) 123/56 (04/01/22 1229)  SpO2: 97 % (04/01/22 1229)   Vital Signs (24h Range):  Temp:  [98 °F (36.7 °C)-99.2 °F (37.3 °C)] 99.2 °F (37.3 °C)  Pulse:  [] 83  Resp:  [17-19] 18  SpO2:  [96 %-97 %] 97 %  BP: (123-201)/(56-92) 123/56     Weight: 43.7 kg (96 lb 5.5 oz)  Body mass index is 17.07 kg/m².    Intake/Output Summary (Last 24 hours) at 4/1/2022 1413  Last data filed at 4/1/2022 1000  Gross per 24 hour   Intake 360 ml   Output 600 ml   Net -240 ml      Physical Exam  Vitals and nursing note reviewed.   HENT:      Head: Normocephalic and atraumatic.   Cardiovascular:      Heart sounds: Murmur heard.     No friction rub. No gallop.   Pulmonary:      Effort: No respiratory distress.      Breath sounds: No stridor. No wheezing, rhonchi or rales.   Chest:      Chest wall: No tenderness.   Abdominal:      General: There is no distension.      Palpations: There is no mass.       Tenderness: There is no abdominal tenderness. There is no right CVA tenderness, left CVA tenderness, guarding or rebound.      Hernia: No hernia is present.   Musculoskeletal:         General: Tenderness (Left knee and stump) present. No swelling, deformity or signs of injury.      Right lower leg: No edema.      Left lower leg: No edema.   Skin:     Coloration: Skin is not jaundiced or pale.      Findings: No bruising, erythema, lesion or rash.   Neurological:      Cranial Nerves: No cranial nerve deficit.      Sensory: No sensory deficit.      Motor: No weakness.      Coordination: Coordination normal.      Deep Tendon Reflexes: Reflexes normal.       Significant Labs: All pertinent labs within the past 24 hours have been reviewed.  BMP: No results for input(s): GLU, NA, K, CL, CO2, BUN, CREATININE, CALCIUM, MG in the last 48 hours.  CBC: No results for input(s): WBC, HGB, HCT, PLT in the last 48 hours.  CMP: No results for input(s): NA, K, CL, CO2, GLU, BUN, CREATININE, CALCIUM, PROT, ALBUMIN, BILITOT, ALKPHOS, AST, ALT, ANIONGAP, EGFRNONAA in the last 48 hours.    Invalid input(s): ESTGFAFRICA    Significant Imaging: I have reviewed all pertinent imaging results/findings within the past 24 hours.

## 2022-04-01 NOTE — PROGRESS NOTES
[facility] Medicine  Progress Note    Patient Name: Pamella Levy  MRN: [id number]  Patient Class: IP- Inpatient   Admission Date: 3/27/2022  Length of Stay: 4 days  Attending Physician: Nahun Dunn, *  Primary Care Provider: Gwendolyn Swain NP        Subjective:     Principal Problem:Closed fracture of distal end of left femur        HPI:  Ms. Levy is a 89 yo female with a PMHx of COPD, CKD, dementia, iron deficiency anemia, HTN, HLD, and PVD s/p BKA who presented to [facility] ED in [facility] with complaints of left knee pain that started after suffering a fall PTA. Patient is wheelchair-bound and states she reached for her medications causing her to fall to the ground on her left lower extremity. She is reporting the pain as constant and severe. No aggravating or alleviating factors. Denies any other injury. Denies hitting her head or loss of consciousness. Denies any weakness, numbness/tingling, CP, SOB, ABD pain, N/V, back pain, HA, lightheadedness, dizziness, syncope, fever or chills. Work-up in the ED revealed left distal femur fracture. Case was discussed with Ortho Surgery per the ED, who recommended knee immobilizer and will see patient in consult. Patient was transferred to [facility] and admitted under Hospital Medicine services.   Patient is a Full Code.       Overview/Hospital Course:  Ms Levy is a 89 year old female who presented to [facility] for evaluation of left knee pain after experiencing a fall. She has left BKA. CT of left knee showed comminuted impacted diametaphyseal fracture. Orthopedic Surgery consulted. Patient was evaluated by Dr Montgomery, she will be managed non surgically, since she is non ambulatory, can let her weightbear through transfers. Awaiting SNF placement. As of 3/30/2022, vital signs stable, no distress, reports left knee pain starting to decrease. PT/OT following. Awaiting placement.   As 3/31/2022, PT/OT recommend Basic Nursing Facility. Case reviewed with  case managemet, patient possible accepted to [facility] . Daughter does not want patient to go to permanent Basic Nursing Facility, however there are safety concerns about discharging home. (Daughter and patient possible living in hotel before admission). As of 4/1/22 pt doing well, no new issues. Pain improved. Plan to discharge with son on Monday.           Review of Systems   Constitutional:  Negative for chills and fever.   HENT:  Positive for hearing loss. Negative for congestion, rhinorrhea and sinus pressure.    Respiratory:  Negative for apnea, cough, choking, chest tightness, shortness of breath, wheezing and stridor.    Cardiovascular:  Negative for chest pain, palpitations and leg swelling.   Gastrointestinal:  Negative for abdominal distention, abdominal pain, diarrhea, nausea and vomiting.   Endocrine: Negative for cold intolerance and heat intolerance.   Genitourinary:  Negative for difficulty urinating and hematuria.   Musculoskeletal:  Negative for arthralgias and joint swelling.        Left Knee pain and swelling    Skin:  Negative for color change, pallor and rash.   Neurological:  Negative for dizziness, seizures, weakness, numbness and headaches.   Psychiatric/Behavioral:  Negative for agitation. The patient is not nervous/anxious.    Objective:     Vital Signs (Most Recent):  Temp: 99.2 °F (37.3 °C) (04/01/22 1229)  Pulse: 83 (04/01/22 1229)  Resp: 18 (04/01/22 1411)  BP: (!) 123/56 (04/01/22 1229)  SpO2: 97 % (04/01/22 1229)   Vital Signs (24h Range):  Temp:  [98 °F (36.7 °C)-99.2 °F (37.3 °C)] 99.2 °F (37.3 °C)  Pulse:  [] 83  Resp:  [17-19] 18  SpO2:  [96 %-97 %] 97 %  BP: (123-201)/(56-92) 123/56     Weight: 43.7 kg (96 lb 5.5 oz)  Body mass index is 17.07 kg/m².    Intake/Output Summary (Last 24 hours) at 4/1/2022 1413  Last data filed at 4/1/2022 1000  Gross per 24 hour   Intake 360 ml   Output 600 ml   Net -240 ml      Physical Exam  Vitals and nursing note reviewed.    HENT:      Head: Normocephalic and atraumatic.   Cardiovascular:      Heart sounds: Murmur heard.     No friction rub. No gallop.   Pulmonary:      Effort: No respiratory distress.      Breath sounds: No stridor. No wheezing, rhonchi or rales.   Chest:      Chest wall: No tenderness.   Abdominal:      General: There is no distension.      Palpations: There is no mass.      Tenderness: There is no abdominal tenderness. There is no right CVA tenderness, left CVA tenderness, guarding or rebound.      Hernia: No hernia is present.   Musculoskeletal:         General: Tenderness (Left knee and stump) present. No swelling, deformity or signs of injury.      Right lower leg: No edema.      Left lower leg: No edema.   Skin:     Coloration: Skin is not jaundiced or pale.      Findings: No bruising, erythema, lesion or rash.   Neurological:      Cranial Nerves: No cranial nerve deficit.      Sensory: No sensory deficit.      Motor: No weakness.      Coordination: Coordination normal.      Deep Tendon Reflexes: Reflexes normal.       Significant Labs: All pertinent labs within the past 24 hours have been reviewed.  BMP: No results for input(s): GLU, NA, K, CL, CO2, BUN, CREATININE, CALCIUM, MG in the last 48 hours.  CBC: No results for input(s): WBC, HGB, HCT, PLT in the last 48 hours.  CMP: No results for input(s): NA, K, CL, CO2, GLU, BUN, CREATININE, CALCIUM, PROT, ALBUMIN, BILITOT, ALKPHOS, AST, ALT, ANIONGAP, EGFRNONAA in the last 48 hours.    Invalid input(s): ESTGFA[place]    Significant Imaging: I have reviewed all pertinent imaging results/findings within the past 24 hours.      Assessment/Plan:      * Closed fracture of distal end of left femur  - Knee immobilizer in place.   - Ortho Surgery consult. Will keep NPO.  - Analgesics as needed.   - Will order preop EKG, echo, CXR.    3/29/2022  Left knee area fracture being treated non surgically   PT/OT  Pain control   Henged Knee Brace       3/30/2022  Left knee area  fracture being treated non surgically   PT/OT  Pain control   Henged Knee Brace       3/31/2022  Left knee area fracture being treated non surgically   PT/OT  Pain control   Henged Knee Brace   D/C soon to Basic Nursing Facility or Home     4/1/22  -Patient will discharge home with son on Monday   -Pain control    Hyperlipidemia  - Continue home statin.     Dementia without behavioral disturbance  - Monitor for delirium.     Chronic obstructive pulmonary disease  -Stable   - Bronchodilators and O2 supplementation as needed.     Primary hypertension  - Continue home antihypertensives.     4/1/2022  BP stable       VTE Risk Mitigation (From admission, onward)         Ordered     Reason for No Pharmacological VTE Prophylaxis  Once        Question:  Reasons:  Answer:  Risk of Bleeding    03/28/22 0505     IP VTE HIGH RISK PATIENT  Once         03/28/22 0505     Place sequential compression device  Until discontinued         03/28/22 0505                Discharge Planning   BEATRIS:      Code Status: Full Code   Is the patient medically ready for discharge?:     Reason for patient still in hospital (select all that apply): Pending disposition  Discharge Plan A: Skilled Nursing Facility                  Yaneth Jordan NP  Department of Hospital Medicine   [facility] - Med Surg

## 2022-04-02 PROCEDURE — 25000003 PHARM REV CODE 250: Performed by: INTERNAL MEDICINE

## 2022-04-02 PROCEDURE — 11000001 HC ACUTE MED/SURG PRIVATE ROOM

## 2022-04-02 PROCEDURE — 63600175 PHARM REV CODE 636 W HCPCS: Performed by: INTERNAL MEDICINE

## 2022-04-02 PROCEDURE — 25000003 PHARM REV CODE 250: Performed by: NURSE PRACTITIONER

## 2022-04-02 RX ORDER — AMLODIPINE BESYLATE 5 MG/1
5 TABLET ORAL DAILY
Status: DISCONTINUED | OUTPATIENT
Start: 2022-04-02 | End: 2022-04-04 | Stop reason: HOSPADM

## 2022-04-02 RX ADMIN — CARVEDILOL 3.12 MG: 3.12 TABLET, FILM COATED ORAL at 08:04

## 2022-04-02 RX ADMIN — AMLODIPINE BESYLATE 5 MG: 5 TABLET ORAL at 02:04

## 2022-04-02 RX ADMIN — GABAPENTIN 300 MG: 300 CAPSULE ORAL at 09:04

## 2022-04-02 RX ADMIN — ATORVASTATIN CALCIUM 20 MG: 10 TABLET, FILM COATED ORAL at 09:04

## 2022-04-02 RX ADMIN — HYDROCODONE BITARTRATE AND ACETAMINOPHEN 1 TABLET: 5; 325 TABLET ORAL at 07:04

## 2022-04-02 RX ADMIN — HYDROCODONE BITARTRATE AND ACETAMINOPHEN 1 TABLET: 5; 325 TABLET ORAL at 10:04

## 2022-04-02 RX ADMIN — HYDROMORPHONE HYDROCHLORIDE 1 MG: 2 INJECTION INTRAMUSCULAR; INTRAVENOUS; SUBCUTANEOUS at 04:04

## 2022-04-02 NOTE — ASSESSMENT & PLAN NOTE
- Knee immobilizer in place.   - Ortho Surgery consult. Will keep NPO.  - Analgesics as needed.   - Will order preop EKG, echo, CXR.    3/29/2022  Left knee area fracture being treated non surgically   PT/OT  Pain control   Henged Knee Brace       3/30/2022  Left knee area fracture being treated non surgically   PT/OT  Pain control   Henged Knee Brace       3/31/2022  Left knee area fracture being treated non surgically   PT/OT  Pain control   Henged Knee Brace   D/C soon to Basic Nursing Facility or Home     4/2/22  -Patient will discharge home with son on Monday   -Pain control

## 2022-04-02 NOTE — SUBJECTIVE & OBJECTIVE
Review of Systems   Constitutional:  Negative for chills and fever.   HENT:  Positive for hearing loss. Negative for congestion, rhinorrhea and sinus pressure.    Respiratory:  Negative for apnea, cough, choking, chest tightness, shortness of breath, wheezing and stridor.    Cardiovascular:  Negative for chest pain, palpitations and leg swelling.   Gastrointestinal:  Negative for abdominal distention, abdominal pain, diarrhea, nausea and vomiting.   Endocrine: Negative for cold intolerance and heat intolerance.   Genitourinary:  Negative for difficulty urinating and hematuria.   Musculoskeletal:  Negative for arthralgias and joint swelling.        Left Knee pain and swelling    Skin:  Negative for color change, pallor and rash.   Neurological:  Negative for dizziness, seizures, weakness, numbness and headaches.   Psychiatric/Behavioral:  Negative for agitation. The patient is not nervous/anxious.    Objective:     Vital Signs (Most Recent):  Temp: 96.7 °F (35.9 °C) (04/02/22 1215)  Pulse: 76 (04/02/22 1215)  Resp: 18 (04/02/22 1215)  BP: (!) 178/120 (04/02/22 1215)  SpO2: 98 % (04/02/22 1215)   Vital Signs (24h Range):  Temp:  [96.7 °F (35.9 °C)-99 °F (37.2 °C)] 96.7 °F (35.9 °C)  Pulse:  [72-84] 76  Resp:  [17-19] 18  SpO2:  [95 %-98 %] 98 %  BP: (125-178)/() 178/120     Weight: 43.7 kg (96 lb 5.5 oz)  Body mass index is 17.07 kg/m².    Intake/Output Summary (Last 24 hours) at 4/2/2022 1323  Last data filed at 4/1/2022 1700  Gross per 24 hour   Intake 150 ml   Output 400 ml   Net -250 ml      Physical Exam  Vitals and nursing note reviewed.   HENT:      Head: Normocephalic and atraumatic.   Cardiovascular:      Heart sounds: Murmur heard.     No friction rub. No gallop.   Pulmonary:      Effort: No respiratory distress.      Breath sounds: No stridor. No wheezing, rhonchi or rales.   Chest:      Chest wall: No tenderness.   Abdominal:      General: There is no distension.      Palpations: There is no  mass.      Tenderness: There is no abdominal tenderness. There is no right CVA tenderness, left CVA tenderness, guarding or rebound.      Hernia: No hernia is present.   Musculoskeletal:         General: Tenderness (Left knee and stump) present. No swelling, deformity or signs of injury.      Right lower leg: No edema.      Left lower leg: No edema.   Skin:     Coloration: Skin is not jaundiced or pale.      Findings: No bruising, erythema, lesion or rash.   Neurological:      Cranial Nerves: No cranial nerve deficit.      Sensory: No sensory deficit.      Motor: No weakness.      Coordination: Coordination normal.      Deep Tendon Reflexes: Reflexes normal.       Significant Labs: All pertinent labs within the past 24 hours have been reviewed.  BMP: No results for input(s): GLU, NA, K, CL, CO2, BUN, CREATININE, CALCIUM, MG in the last 48 hours.  CBC: No results for input(s): WBC, HGB, HCT, PLT in the last 48 hours.    Significant Imaging: I have reviewed all pertinent imaging results/findings within the past 24 hours.

## 2022-04-02 NOTE — PROGRESS NOTES
[facility] Medicine  Progress Note    Patient Name: Pamella Levy  MRN: [id number]  Patient Class: IP- Inpatient   Admission Date: 3/27/2022  Length of Stay: 5 days  Attending Physician: Nahun Dunn, *  Primary Care Provider: Gwendolyn Swain NP        Subjective:     Principal Problem:Closed fracture of distal end of left femur        HPI:  Ms. Levy is a 89 yo female with a PMHx of COPD, CKD, dementia, iron deficiency anemia, HTN, HLD, and PVD s/p BKA who presented to [facility] ED in [facility] with complaints of left knee pain that started after suffering a fall PTA. Patient is wheelchair-bound and states she reached for her medications causing her to fall to the ground on her left lower extremity. She is reporting the pain as constant and severe. No aggravating or alleviating factors. Denies any other injury. Denies hitting her head or loss of consciousness. Denies any weakness, numbness/tingling, CP, SOB, ABD pain, N/V, back pain, HA, lightheadedness, dizziness, syncope, fever or chills. Work-up in the ED revealed left distal femur fracture. Case was discussed with Ortho Surgery per the ED, who recommended knee immobilizer and will see patient in consult. Patient was transferred to [facility] and admitted under Hospital Medicine services.   Patient is a Full Code.       Overview/Hospital Course:  Ms Levy is a 89 year old female who presented to [facility] for evaluation of left knee pain after experiencing a fall. She has left BKA. CT of left knee showed comminuted impacted diametaphyseal fracture. Orthopedic Surgery consulted. Patient was evaluated by Dr Montgomery, she will be managed non surgically, since she is non ambulatory, can let her weightbear through transfers. Awaiting SNF placement. As of 3/30/2022, vital signs stable, no distress, reports left knee pain starting to decrease. PT/OT following. Awaiting placement.   As 3/31/2022, PT/OT recommend Basic Nursing Facility. Case reviewed with  case managemet, patient possible accepted to [facility] . Daughter does not want patient to go to permanent Basic Nursing Facility, however there are safety concerns about discharging home. (Daughter and patient possible living in hotel before admission). As of 4/1/22 pt doing well, no new issues. Pain improved. Plan to discharge with son on Monday. As of 4/2/22 no change in status. Bp elevated, amlodipine added for better control. Will monitor.           Review of Systems   Constitutional:  Negative for chills and fever.   HENT:  Positive for hearing loss. Negative for congestion, rhinorrhea and sinus pressure.    Respiratory:  Negative for apnea, cough, choking, chest tightness, shortness of breath, wheezing and stridor.    Cardiovascular:  Negative for chest pain, palpitations and leg swelling.   Gastrointestinal:  Negative for abdominal distention, abdominal pain, diarrhea, nausea and vomiting.   Endocrine: Negative for cold intolerance and heat intolerance.   Genitourinary:  Negative for difficulty urinating and hematuria.   Musculoskeletal:  Negative for arthralgias and joint swelling.        Left Knee pain and swelling    Skin:  Negative for color change, pallor and rash.   Neurological:  Negative for dizziness, seizures, weakness, numbness and headaches.   Psychiatric/Behavioral:  Negative for agitation. The patient is not nervous/anxious.    Objective:     Vital Signs (Most Recent):  Temp: 96.7 °F (35.9 °C) (04/02/22 1215)  Pulse: 76 (04/02/22 1215)  Resp: 18 (04/02/22 1215)  BP: (!) 178/120 (04/02/22 1215)  SpO2: 98 % (04/02/22 1215)   Vital Signs (24h Range):  Temp:  [96.7 °F (35.9 °C)-99 °F (37.2 °C)] 96.7 °F (35.9 °C)  Pulse:  [72-84] 76  Resp:  [17-19] 18  SpO2:  [95 %-98 %] 98 %  BP: (125-178)/() 178/120     Weight: 43.7 kg (96 lb 5.5 oz)  Body mass index is 17.07 kg/m².    Intake/Output Summary (Last 24 hours) at 4/2/2022 1323  Last data filed at 4/1/2022 1700  Gross per 24 hour    Intake 150 ml   Output 400 ml   Net -250 ml      Physical Exam  Vitals and nursing note reviewed.   HENT:      Head: Normocephalic and atraumatic.   Cardiovascular:      Heart sounds: Murmur heard.     No friction rub. No gallop.   Pulmonary:      Effort: No respiratory distress.      Breath sounds: No stridor. No wheezing, rhonchi or rales.   Chest:      Chest wall: No tenderness.   Abdominal:      General: There is no distension.      Palpations: There is no mass.      Tenderness: There is no abdominal tenderness. There is no right CVA tenderness, left CVA tenderness, guarding or rebound.      Hernia: No hernia is present.   Musculoskeletal:         General: Tenderness (Left knee and stump) present. No swelling, deformity or signs of injury.      Right lower leg: No edema.      Left lower leg: No edema.   Skin:     Coloration: Skin is not jaundiced or pale.      Findings: No bruising, erythema, lesion or rash.   Neurological:      Cranial Nerves: No cranial nerve deficit.      Sensory: No sensory deficit.      Motor: No weakness.      Coordination: Coordination normal.      Deep Tendon Reflexes: Reflexes normal.       Significant Labs: All pertinent labs within the past 24 hours have been reviewed.  BMP: No results for input(s): GLU, NA, K, CL, CO2, BUN, CREATININE, CALCIUM, MG in the last 48 hours.  CBC: No results for input(s): WBC, HGB, HCT, PLT in the last 48 hours.    Significant Imaging: I have reviewed all pertinent imaging results/findings within the past 24 hours.      Assessment/Plan:      * Closed fracture of distal end of left femur  - Knee immobilizer in place.   - Ortho Surgery consult. Will keep NPO.  - Analgesics as needed.   - Will order preop EKG, echo, CXR.    3/29/2022  Left knee area fracture being treated non surgically   PT/OT  Pain control   Henged Knee Brace       3/30/2022  Left knee area fracture being treated non surgically   PT/OT  Pain control   Henged Knee Brace        3/31/2022  Left knee area fracture being treated non surgically   PT/OT  Pain control   Henged Knee Brace   D/C soon to Basic Nursing Facility or Home     4/2/22  -Patient will discharge home with son on Monday   -Pain control    Hyperlipidemia  - Continue home statin.     Dementia without behavioral disturbance  - Monitor for delirium.     Chronic obstructive pulmonary disease  -Stable   - Bronchodilators and O2 supplementation as needed.     Primary hypertension  BP elevated, amlodipine added for better control  Continue coreg   Monitor       VTE Risk Mitigation (From admission, onward)         Ordered     Reason for No Pharmacological VTE Prophylaxis  Once        Question:  Reasons:  Answer:  Risk of Bleeding    03/28/22 0505     IP VTE HIGH RISK PATIENT  Once         03/28/22 0505     Place sequential compression device  Until discontinued         03/28/22 0505                Discharge Planning   BEATRIS:      Code Status: Full Code   Is the patient medically ready for discharge?:     Reason for patient still in hospital (select all that apply): Pending disposition  Discharge Plan A: Skilled Nursing Facility                  Yaneth Jordan NP  Department of Hospital Medicine   [facility] - Med Surg

## 2022-04-02 NOTE — PLAN OF CARE
Remains injury free. Pain managed with oral medication. Repositions without difficulty. No s/s acute distress. Will monitor.

## 2022-04-03 PROCEDURE — 25000003 PHARM REV CODE 250: Performed by: NURSE PRACTITIONER

## 2022-04-03 PROCEDURE — 11000001 HC ACUTE MED/SURG PRIVATE ROOM

## 2022-04-03 PROCEDURE — 25000003 PHARM REV CODE 250: Performed by: INTERNAL MEDICINE

## 2022-04-03 RX ORDER — HYDROCODONE BITARTRATE AND ACETAMINOPHEN 5; 325 MG/1; MG/1
1 TABLET ORAL EVERY 4 HOURS PRN
Status: DISCONTINUED | OUTPATIENT
Start: 2022-04-04 | End: 2022-04-04 | Stop reason: HOSPADM

## 2022-04-03 RX ADMIN — HYDROCODONE BITARTRATE AND ACETAMINOPHEN 1 TABLET: 5; 325 TABLET ORAL at 11:04

## 2022-04-03 RX ADMIN — CARVEDILOL 3.12 MG: 3.12 TABLET, FILM COATED ORAL at 09:04

## 2022-04-03 RX ADMIN — HYDROCODONE BITARTRATE AND ACETAMINOPHEN 1 TABLET: 5; 325 TABLET ORAL at 02:04

## 2022-04-03 RX ADMIN — HYDROCODONE BITARTRATE AND ACETAMINOPHEN 1 TABLET: 5; 325 TABLET ORAL at 06:04

## 2022-04-03 RX ADMIN — GABAPENTIN 300 MG: 300 CAPSULE ORAL at 09:04

## 2022-04-03 RX ADMIN — AMLODIPINE BESYLATE 5 MG: 5 TABLET ORAL at 09:04

## 2022-04-03 RX ADMIN — ATORVASTATIN CALCIUM 20 MG: 10 TABLET, FILM COATED ORAL at 09:04

## 2022-04-03 NOTE — PLAN OF CARE
Patient remains free of falls and injuries during shift. Pain managed with PRN medications. Purewick in place. Will continue to monitor.

## 2022-04-03 NOTE — ASSESSMENT & PLAN NOTE
- Knee immobilizer in place.   - Ortho Surgery consult. Will keep NPO.  - Analgesics as needed.   - Will order preop EKG, echo, CXR.    3/29/2022  Left knee area fracture being treated non surgically   PT/OT  Pain control   Henged Knee Brace       3/30/2022  Left knee area fracture being treated non surgically   PT/OT  Pain control   Henged Knee Brace       3/31/2022  Left knee area fracture being treated non surgically   PT/OT  Pain control   Henged Knee Brace   D/C soon to Basic Nursing Facility or Home     4/3/22  -Patient will discharge home with son on Monday   -Pain control  -nonweightbearing to the left lower extremity for the next 6 weeks with transfers only.

## 2022-04-03 NOTE — PROGRESS NOTES
[facility] Medicine  Progress Note    Patient Name: Pamella Levy  MRN: [id number]  Patient Class: IP- Inpatient   Admission Date: 3/27/2022  Length of Stay: 6 days  Attending Physician: Nahun Dunn, *  Primary Care Provider: Gwendolyn Swain NP        Subjective:     Principal Problem:Closed fracture of distal end of left femur        HPI:  Ms. Levy is a 89 yo female with a PMHx of COPD, CKD, dementia, iron deficiency anemia, HTN, HLD, and PVD s/p BKA who presented to [facility] in [facility] with complaints of left knee pain that started after suffering a fall PTA. Patient is wheelchair-bound and states she reached for her medications causing her to fall to the ground on her left lower extremity. She is reporting the pain as constant and severe. No aggravating or alleviating factors. Denies any other injury. Denies hitting her head or loss of consciousness. Denies any weakness, numbness/tingling, CP, SOB, ABD pain, N/V, back pain, HA, lightheadedness, dizziness, syncope, fever or chills. Work-up in the ED revealed left distal femur fracture. Case was discussed with Ortho Surgery per the ED, who recommended knee immobilizer and will see patient in consult. Patient was transferred to [facility] and admitted under Hospital Medicine services.   Patient is a Full Code.       Overview/Hospital Course:  Ms Levy is a 89 year old female who presented to [facility] for evaluation of left knee pain after experiencing a fall. She has left BKA. CT of left knee showed comminuted impacted diametaphyseal fracture. Orthopedic Surgery consulted. Patient was evaluated by Dr Montgomery, she will be managed non surgically, since she is non ambulatory, can let her weightbear through transfers. Awaiting SNF placement. As of 3/30/2022, vital signs stable, no distress, reports left knee pain starting to decrease. PT/OT following. Awaiting placement.   As 3/31/2022, PT/OT recommend Basic Nursing Facility. Case reviewed with  case managemet, patient possible accepted to [facility] . Daughter does not want patient to go to permanent Basic Nursing Facility, however there are safety concerns about discharging home. (Daughter and patient possible living in hotel before admission). As of 4/1/22 pt doing well, no new issues. Pain improved. Plan to discharge with son on Monday. As of 4/2/22 no change in status. Bp elevated, amlodipine added for better control. Will monitor. As of 4/3/22 pt lying in bed eating breakfast. No new issues. Continue current plan of care. Plans for discharge tomorrow.           Review of Systems   Constitutional:  Negative for chills and fever.   HENT:  Positive for hearing loss. Negative for congestion, rhinorrhea and sinus pressure.    Respiratory:  Negative for apnea, cough, choking, chest tightness, shortness of breath, wheezing and stridor.    Cardiovascular:  Negative for chest pain, palpitations and leg swelling.   Gastrointestinal:  Negative for abdominal distention, abdominal pain, diarrhea, nausea and vomiting.   Endocrine: Negative for cold intolerance and heat intolerance.   Genitourinary:  Negative for difficulty urinating and hematuria.   Musculoskeletal:  Negative for arthralgias and joint swelling.        Left Knee pain and swelling    Skin:  Negative for color change, pallor and rash.   Neurological:  Negative for dizziness, seizures, weakness, numbness and headaches.   Psychiatric/Behavioral:  Negative for agitation. The patient is not nervous/anxious.    Objective:     Vital Signs (Most Recent):  Temp: 98.5 °F (36.9 °C) (04/03/22 1245)  Pulse: 79 (04/03/22 1245)  Resp: 17 (04/03/22 1245)  BP: 106/64 (04/03/22 1245)  SpO2: 97 % (04/03/22 1245) Vital Signs (24h Range):  Temp:  [97.7 °F (36.5 °C)-98.7 °F (37.1 °C)] 98.5 °F (36.9 °C)  Pulse:  [68-88] 79  Resp:  [17-19] 17  SpO2:  [97 %-99 %] 97 %  BP: (106-183)/(57-88) 106/64     Weight: 44.2 kg (97 lb 7.1 oz)  Body mass index is 17.26  kg/m².    Intake/Output Summary (Last 24 hours) at 4/3/2022 1400  Last data filed at 4/3/2022 1100  Gross per 24 hour   Intake 183 ml   Output 1650 ml   Net -1467 ml      Physical Exam  Vitals and nursing note reviewed.   HENT:      Head: Normocephalic and atraumatic.   Cardiovascular:      Heart sounds: Murmur heard.     No friction rub. No gallop.   Pulmonary:      Effort: No respiratory distress.      Breath sounds: No stridor. No wheezing, rhonchi or rales.   Chest:      Chest wall: No tenderness.   Abdominal:      General: There is no distension.      Palpations: There is no mass.      Tenderness: There is no abdominal tenderness. There is no right CVA tenderness, left CVA tenderness, guarding or rebound.      Hernia: No hernia is present.   Musculoskeletal:         General: Tenderness (Left knee and stump) present. No swelling, deformity or signs of injury.      Right lower leg: No edema.      Left lower leg: No edema.   Skin:     Coloration: Skin is not jaundiced or pale.      Findings: No bruising, erythema, lesion or rash.   Neurological:      Cranial Nerves: No cranial nerve deficit.      Sensory: No sensory deficit.      Motor: No weakness.      Coordination: Coordination normal.      Deep Tendon Reflexes: Reflexes normal.       Significant Labs: All pertinent labs within the past 24 hours have been reviewed.    Significant Imaging: I have reviewed all pertinent imaging results/findings within the past 24 hours.      Assessment/Plan:      * Closed fracture of distal end of left femur  - Knee immobilizer in place.   - Ortho Surgery consult. Will keep NPO.  - Analgesics as needed.   - Will order preop EKG, echo, CXR.    3/29/2022  Left knee area fracture being treated non surgically   PT/OT  Pain control   Henged Knee Brace       3/30/2022  Left knee area fracture being treated non surgically   PT/OT  Pain control   Henged Knee Brace       3/31/2022  Left knee area fracture being treated non surgically    PT/OT  Pain control   Henged Knee Brace   D/C soon to Basic Nursing Facility or Home     4/3/22  -Patient will discharge home with son on Monday   -Pain control  -nonweightbearing to the left lower extremity for the next 6 weeks with transfers only.     Hyperlipidemia  - Continue home statin.     Dementia without behavioral disturbance  - Monitor for delirium.     Chronic obstructive pulmonary disease  -Stable   - Bronchodilators and O2 supplementation as needed.     Primary hypertension  Bp improved  Continue amlodipine and coreg   Monitor       VTE Risk Mitigation (From admission, onward)         Ordered     Reason for No Pharmacological VTE Prophylaxis  Once        Question:  Reasons:  Answer:  Risk of Bleeding    03/28/22 0505     IP VTE HIGH RISK PATIENT  Once         03/28/22 0505     Place sequential compression device  Until discontinued         03/28/22 0505                Discharge Planning   BEATRIS:      Code Status: Full Code   Is the patient medically ready for discharge?:     Reason for patient still in hospital (select all that apply): Pending disposition  Discharge Plan A: Skilled Nursing Facility                  Yaneth Jordan NP  Department of Hospital Medicine   [facility] - Med Surg

## 2022-04-03 NOTE — SUBJECTIVE & OBJECTIVE
Review of Systems   Constitutional:  Negative for chills and fever.   HENT:  Positive for hearing loss. Negative for congestion, rhinorrhea and sinus pressure.    Respiratory:  Negative for apnea, cough, choking, chest tightness, shortness of breath, wheezing and stridor.    Cardiovascular:  Negative for chest pain, palpitations and leg swelling.   Gastrointestinal:  Negative for abdominal distention, abdominal pain, diarrhea, nausea and vomiting.   Endocrine: Negative for cold intolerance and heat intolerance.   Genitourinary:  Negative for difficulty urinating and hematuria.   Musculoskeletal:  Negative for arthralgias and joint swelling.        Left Knee pain and swelling    Skin:  Negative for color change, pallor and rash.   Neurological:  Negative for dizziness, seizures, weakness, numbness and headaches.   Psychiatric/Behavioral:  Negative for agitation. The patient is not nervous/anxious.    Objective:     Vital Signs (Most Recent):  Temp: 98.5 °F (36.9 °C) (04/03/22 1245)  Pulse: 79 (04/03/22 1245)  Resp: 17 (04/03/22 1245)  BP: 106/64 (04/03/22 1245)  SpO2: 97 % (04/03/22 1245) Vital Signs (24h Range):  Temp:  [97.7 °F (36.5 °C)-98.7 °F (37.1 °C)] 98.5 °F (36.9 °C)  Pulse:  [68-88] 79  Resp:  [17-19] 17  SpO2:  [97 %-99 %] 97 %  BP: (106-183)/(57-88) 106/64     Weight: 44.2 kg (97 lb 7.1 oz)  Body mass index is 17.26 kg/m².    Intake/Output Summary (Last 24 hours) at 4/3/2022 1400  Last data filed at 4/3/2022 1100  Gross per 24 hour   Intake 183 ml   Output 1650 ml   Net -1467 ml      Physical Exam  Vitals and nursing note reviewed.   HENT:      Head: Normocephalic and atraumatic.   Cardiovascular:      Heart sounds: Murmur heard.     No friction rub. No gallop.   Pulmonary:      Effort: No respiratory distress.      Breath sounds: No stridor. No wheezing, rhonchi or rales.   Chest:      Chest wall: No tenderness.   Abdominal:      General: There is no distension.      Palpations: There is no mass.       Tenderness: There is no abdominal tenderness. There is no right CVA tenderness, left CVA tenderness, guarding or rebound.      Hernia: No hernia is present.   Musculoskeletal:         General: Tenderness (Left knee and stump) present. No swelling, deformity or signs of injury.      Right lower leg: No edema.      Left lower leg: No edema.   Skin:     Coloration: Skin is not jaundiced or pale.      Findings: No bruising, erythema, lesion or rash.   Neurological:      Cranial Nerves: No cranial nerve deficit.      Sensory: No sensory deficit.      Motor: No weakness.      Coordination: Coordination normal.      Deep Tendon Reflexes: Reflexes normal.       Significant Labs: All pertinent labs within the past 24 hours have been reviewed.    Significant Imaging: I have reviewed all pertinent imaging results/findings within the past 24 hours.

## 2022-04-03 NOTE — PLAN OF CARE
Remains injury free. Pain managed with oral medication. Repositioned with some assist. Pure wick in place. No s/s acute distress.

## 2022-04-04 VITALS
DIASTOLIC BLOOD PRESSURE: 54 MMHG | HEIGHT: 63 IN | BODY MASS INDEX: 16.88 KG/M2 | OXYGEN SATURATION: 97 % | RESPIRATION RATE: 17 BRPM | TEMPERATURE: 99 F | SYSTOLIC BLOOD PRESSURE: 106 MMHG | HEART RATE: 72 BPM | WEIGHT: 95.25 LBS

## 2022-04-04 PROCEDURE — 25000003 PHARM REV CODE 250: Performed by: INTERNAL MEDICINE

## 2022-04-04 PROCEDURE — 25000003 PHARM REV CODE 250: Performed by: NURSE PRACTITIONER

## 2022-04-04 PROCEDURE — 63600175 PHARM REV CODE 636 W HCPCS: Performed by: NURSE PRACTITIONER

## 2022-04-04 RX ORDER — AMLODIPINE BESYLATE 5 MG/1
5 TABLET ORAL DAILY
Qty: 30 TABLET | Refills: 0 | Status: SHIPPED | OUTPATIENT
Start: 2022-04-04 | End: 2022-06-22 | Stop reason: SDUPTHER

## 2022-04-04 RX ADMIN — CARVEDILOL 3.12 MG: 3.12 TABLET, FILM COATED ORAL at 09:04

## 2022-04-04 RX ADMIN — HYDROCODONE BITARTRATE AND ACETAMINOPHEN 1 TABLET: 5; 325 TABLET ORAL at 11:04

## 2022-04-04 RX ADMIN — HYDRALAZINE HYDROCHLORIDE 5 MG: 20 INJECTION, SOLUTION INTRAMUSCULAR; INTRAVENOUS at 04:04

## 2022-04-04 RX ADMIN — AMLODIPINE BESYLATE 5 MG: 5 TABLET ORAL at 09:04

## 2022-04-04 NOTE — DISCHARGE SUMMARY
[facility] Medicine  Discharge Summary      Patient Name: Pamella Levy  MRN: [id number]  Patient Class: IP- Inpatient  Admission Date: 3/27/2022  Hospital Length of Stay: 7 days  Discharge Date and Time:  04/04/2022 12:45 PM  Attending Physician: Nahun Dunn, *   Discharging Provider: Yaneth Jordan NP  Primary Care Provider: Gwendolyn Swain NP      HPI:   Ms. Levy is a 89 yo female with a PMHx of COPD, CKD, dementia, iron deficiency anemia, HTN, HLD, and PVD s/p BKA who presented to [facility] ED in [facility] with complaints of left knee pain that started after suffering a fall PTA. Patient is wheelchair-bound and states she reached for her medications causing her to fall to the ground on her left lower extremity. She is reporting the pain as constant and severe. No aggravating or alleviating factors. Denies any other injury. Denies hitting her head or loss of consciousness. Denies any weakness, numbness/tingling, CP, SOB, ABD pain, N/V, back pain, HA, lightheadedness, dizziness, syncope, fever or chills. Work-up in the ED revealed left distal femur fracture. Case was discussed with Ortho Surgery per the ED, who recommended knee immobilizer and will see patient in consult. Patient was transferred to [facility] and admitted under Hospital Medicine services.   Patient is a Full Code.       * No surgery found *      Hospital Course:   Ms Levy is a 89 year old female who presented to [facility] for evaluation of left knee pain after experiencing a fall. She has left BKA. CT of left knee showed comminuted impacted diametaphyseal fracture. Orthopedic Surgery consulted. Patient was evaluated by Dr Montgomery, she will be managed non surgically, since she is non ambulatory, can let her weightbear through transfers. Awaiting SNF placement. As of 3/30/2022, vital signs stable, no distress, reports left knee pain starting to decrease. PT/OT following. Awaiting placement.   As 3/31/2022, PT/OT recommend  Basic Nursing Facility. Case reviewed with case managemet, patient possible accepted to [facility] . Daughter does not want patient to go to permanent Basic Nursing Facility, however there are safety concerns about discharging home. (Daughter and patient possible living in hotel before admission). As of 4/1/22 pt doing well, no new issues. Pain improved. Plan to discharge with son on Monday. As of 4/2/22 no change in status. Bp elevated, amlodipine added for better control. Will monitor. As of 4/3/22 pt lying in bed eating breakfast. No new issues. Continue current plan of care. Plans for discharge tomorrow. As of 4/4/22 pt lying in bed, no distress noted. Daughter at bedside. VSS. Home meds reconciled. Patient seen and examined on the date of discharge and found stable for discharge. [facility] has been arranged. Patient to follow-up outpatient with PCP and orthopedic surgery.        Goals of Care Treatment Preferences:  Code Status: Full Code      Living Will: Yes  LaPOST: Yes           Consults:   Consults (From admission, onward)        Status Ordering Provider     Inpatient consult to Social Work  Once        Provider:  (Not yet assigned)    Acknowledged SUMI PITT     Inpatient consult to Social Work  Once        Provider:  (Not yet assigned)    Completed REMA RUIZ     Inpatient consult to Social Work  Once        Provider:  (Not yet assigned)    Completed JUAREZ ZELAYA     Inpatient consult to Social Work  Once        Provider:  (Not yet assigned)    Completed REMA RUIZ     Inpatient consult to Orthopedic Surgery  Once        Provider:  Frieda Brown MD    Completed TOD SELBY          No new Assessment & Plan notes have been filed under this hospital service since the last note was generated.  Service: Hospital Medicine    Final Active Diagnoses:    Diagnosis Date Noted POA    PRINCIPAL PROBLEM:  Closed fracture of distal end of left femur [S72.402A]  03/28/2022 Yes    Hyperlipidemia [E78.5] 03/28/2022 Yes     Chronic    Dementia without behavioral disturbance [F03.90] 05/10/2021 Yes     Chronic    Chronic obstructive pulmonary disease [J44.9] 05/10/2021 Yes     Chronic    Primary hypertension [I10] 10/25/2019 Yes     Chronic      Problems Resolved During this Admission:       Discharged Condition: stable    Disposition: Home or Self Care    Follow Up:   Follow-up Information     Gwendolyn Swain NP Follow up in 3 day(s).    Specialty: Family Medicine  Why: for hospital follow-up  Contact information:  [address] [address] [address]  [phone]             Nahum King PA-C Follow up in 3 week(s).    Specialty: Orthopedic Surgery  Why: for hospital follow-up  Contact information:  [address] [place] [address]  [phone]             [facility] Follow up.    Specialty: Home Health Services  Contact information:  [address]  [facility] [id number] [phone]                       Patient Instructions:      Ambulatory referral/consult to Outpatient Case Management   Referral Priority: Routine Referral Type: Consultation   Referral Reason: Specialty Services Required   Number of Visits Requested: 1     Ambulatory referral/consult to Home Health   Standing Status: Future   Referral Priority: Routine Referral Type: Home Health   Referral Reason: Specialty Services Required   Requested Specialty: Home Health Services   Number of Visits Requested: 1     Weight bearing restrictions (specify):   Order Comments: Non weight bearing to LLE       Significant Diagnostic Studies: Labs: BMP: No results for input(s): GLU, NA, K, CL, CO2, BUN, CREATININE, CALCIUM, MG in the last 48 hours., CMP No results for input(s): NA, K, CL, CO2, GLU, BUN, CREATININE, CALCIUM, PROT, ALBUMIN, BILITOT, ALKPHOS, AST, ALT, ANIONGAP, ESTGFRAFRICA, EGFRNONAA in the last 48 hours., CBC No results for input(s): WBC, HGB, HCT, PLT in the last 48 hours. and All  labs within the past 24 hours have been reviewed    Pending Diagnostic Studies:     None         Medications:  Reconciled Home Medications:      Medication List      START taking these medications    amLODIPine 5 MG tablet  Commonly known as: NORVASC  Take 1 tablet (5 mg total) by mouth once daily.        CONTINUE taking these medications    atorvastatin 20 MG tablet  Commonly known as: LIPITOR  TAKE 1 TABLET BY MOUTH EVERY DAY AT NIGHT     carvediloL 3.125 MG tablet  Commonly known as: COREG  TAKE 1 TABLET BY MOUTH 2 TIMES DAILY.     gabapentin 300 MG capsule  Commonly known as: NEURONTIN  TAKE 1 CAPSULE BY MOUTH EVERY DAY AT NIGHT     HYDROcodone-acetaminophen 5-325 mg per tablet  Commonly known as: NORCO  Take 1 tablet by mouth every 4 (four) hours as needed.     traZODone 50 MG tablet  Commonly known as: DESYREL  TAKE HALF TABLET BY MOUTH NIGHTLY AS NEEDED FOR INSOMNIA OR DEPRESSION            Indwelling Lines/Drains at time of discharge:   Lines/Drains/Airways     Drain  Duration           Female External Urinary Catheter 04/03/22 1133 1 day                Time spent on the discharge of patient: 40 minutes         Yaneth Jordan NP  Department of Hospital Medicine  [facility] - Med Surg

## 2022-04-04 NOTE — PLAN OF CARE
Problem: Adult Inpatient Plan of Care  Goal: Plan of Care Review  4/4/2022 0117 by Lissette Ponce RN  Outcome: Ongoing, Progressing  4/4/2022 0116 by Lissette Ponce RN  Outcome: Ongoing, Progressing  Goal: Patient-Specific Goal (Individualized)  4/4/2022 0117 by Lissette Ponce RN  Outcome: Ongoing, Progressing  4/4/2022 0116 by Lissette Ponce RN  Outcome: Ongoing, Progressing  Goal: Absence of Hospital-Acquired Illness or Injury  4/4/2022 0117 by Lissette Ponce RN  Outcome: Ongoing, Progressing  4/4/2022 0116 by Lissette Ponce RN  Outcome: Ongoing, Progressing  Goal: Optimal Comfort and Wellbeing  4/4/2022 0117 by Lissette Ponce RN  Outcome: Ongoing, Progressing  4/4/2022 0116 by Lissette Ponce RN  Outcome: Ongoing, Progressing  Goal: Readiness for Transition of Care  4/4/2022 0117 by Lissette Ponce RN  Outcome: Ongoing, Progressing  4/4/2022 0116 by Lissette Ponce RN  Outcome: Ongoing, Progressing     Problem: Fall Injury Risk  Goal: Absence of Fall and Fall-Related Injury  4/4/2022 0117 by Lissette Ponce RN  Outcome: Ongoing, Progressing  4/4/2022 0116 by Lissette Ponce RN  Outcome: Ongoing, Progressing     Problem: Skin Injury Risk Increased  Goal: Skin Health and Integrity  4/4/2022 0117 by Lissette Ponce RN  Outcome: Ongoing, Progressing  4/4/2022 0116 by Lissette Ponce RN  Outcome: Ongoing, Progressing

## 2022-04-04 NOTE — CONSULTS
Cm spoke with patients daughter regarding HH, preference obtained for [facility]. Referral sent via [facility], patient accepted by millie .

## 2022-04-04 NOTE — PLAN OF CARE
[facility] - Med Surg  Discharge Reassessment    Primary Care Provider: Gwendolyn Swain NP    Expected Discharge Date:     Reassessment (most recent)     Discharge Reassessment - 04/04/22 0844        Discharge Reassessment    Assessment Type Discharge Planning Reassessment     Did the patient's condition or plan change since previous assessment? No     Discharge Plan discussed with: Adult children     Communicated BEATRIS with patient/caregiver Yes     Discharge Plan A Home Health     Discharge Plan B Home Health     DME Needed Upon Discharge  none     Discharge Barriers Identified None        Post-Acute Status    Discharge Delays None known at this time               Patient is medically stable for DC, pending stable home DC. Patient will dc to live with her son and daughter as her caretaker. [facility] transport arranged. No other cm needs.

## 2022-04-04 NOTE — PHYSICIAN QUERY
"PT Name: Pamella Levy  MR #: [id number]    DOCUMENTATION CLARIFICATION      CDS/: MAXINE Godoy, RN               Contact information:[email or web address]    This form is a permanent document in the medical record.      Query Date: April 4, 2022    By submitting this query, we are merely seeking further clarification of documentation. Please utilize your independent clinical judgment when addressing the question(s) below.    The Medical Record contains the following:  Indicators Supporting Clinical Findings Location in Medical Record    x BMI        Body mass index is 16.79 kg/m². Orthopedic Surgery Progress Notes 03/30/2022      x Height/Weight           Weight: 43 kg (94 lb 12.8 oz)            Height: 5' 3" (160 cm) Orthopedic Surgery Progress Notes 03/30/2022      x Acute/Chronic Illness Closed fracture of distal end of left femur  Hyperlipidemia, Dementia without behavioral disturbance, Chronic obstructive pulmonary disease, Primary hypertension     Iron deficiency anemia, PVD s/p BKA      Patient has a history of an AKA on the right leg for vascular disease. Hospital Medicine Progress Notes 04/03/2022          Hospital Medicine H&P 03/28/2022    Orthopedic Surgery Progress Notes 03/30/2022      x Treatment Cardiac, Dysphagia Mechanical Soft Diet  MD Orders 03/28/2022      x Other Daughter does not want patient to go to permanent Basic Nursing Facility, however there are safety concerns about discharging home. (Daughter and patient possible living in hotel before admission).     Patient is wheelchair-bound  Hospital Medicine Discharge Summary 04/04/2022       Provider, please specify the diagnosis associated with the above clinical findings.   [  x ] Underweight   [   ] Other diagnosis (please specify): _________   [  ] Clinically Undetermined       Please document in your progress notes daily for the duration of treatment until resolved and include in your discharge summary.  Form 74032    "

## 2022-04-05 ENCOUNTER — TELEPHONE (OUTPATIENT)
Dept: INTERNAL MEDICINE | Facility: CLINIC | Age: 87
End: 2022-04-05
Payer: MEDICARE

## 2022-04-05 NOTE — TELEPHONE ENCOUNTER
----- Message from Tessa Escobar sent at 4/5/2022 12:43 PM CDT -----  Contact: Daughter, Ermelinda, [phone]  Calling because when she was discharged from the hospital yesterday, they were told Home Health was being ordered for her. Please call her. Thanks.

## 2022-04-05 NOTE — TELEPHONE ENCOUNTER
Nurse spoke with pt daughter, while on phone [facility] called to confirm nurse visit for tomorrow.  Nurse confirmed hosp f/u appt with Ermelinda pt daughter.

## 2022-04-05 NOTE — TELEPHONE ENCOUNTER
----- Message from Neha Coyle sent at 4/5/2022  1:04 PM CDT -----  Pts daughter is calling in regards to getting assistance with home health. Daughter stated that home health was supposed to come out to the home today but called and said that the pts insurance was not in network. Please call [phone] (home)

## 2022-04-05 NOTE — TELEPHONE ENCOUNTER
----- Message from Tessa Escobar sent at 4/5/2022 12:43 PM CDT -----  Contact: Daughter, Ermelinda, [phone]  Calling because when she was discharged from the hospital yesterday, they were told Home Health was being ordered for her. Please call her. Thanks.

## 2022-04-06 ENCOUNTER — TELEPHONE (OUTPATIENT)
Dept: FAMILY MEDICINE | Facility: CLINIC | Age: 87
End: 2022-04-06
Payer: MEDICARE

## 2022-04-06 NOTE — TELEPHONE ENCOUNTER
----- Message from Amalia Stern sent at 4/6/2022 12:12 PM CDT -----  Pt is calling in regards to a nursing service that was suppose to come by this morning to change her and never showed up. Pt can be reached at [phone] (Anthony)

## 2022-04-07 RX ORDER — HYDROCODONE BITARTRATE AND ACETAMINOPHEN 5; 325 MG/1; MG/1
1 TABLET ORAL EVERY 6 HOURS PRN
Qty: 28 TABLET | Refills: 0 | Status: SHIPPED | OUTPATIENT
Start: 2022-04-07 | End: 2022-04-14

## 2022-05-25 ENCOUNTER — TELEPHONE (OUTPATIENT)
Dept: INTERNAL MEDICINE | Facility: CLINIC | Age: 87
End: 2022-05-25
Payer: MEDICARE

## 2022-05-25 NOTE — TELEPHONE ENCOUNTER
----- Message from Gwendolyn Swain NP sent at 5/25/2022  2:00 PM CDT -----  Contact: [phone]  Verbal order ok for this time, but I have not seen patient since hospital discharge and did not place order for HH. Will need to be evaluated in visit, even if it needs to be virtual.     Gwendolyn     ----- Message -----  From: Marga Verdin LPN  Sent: 5/25/2022   1:47 PM CDT  To: EDUARD Beaulieu brigette with [facility] called requesting a call in reference to placing a order for xray due to pt condition.   ----- Message -----  From: George Salter  Sent: 5/25/2022   1:16 PM CDT  To: [facility] Staff    Calling regarding pt fractured femur , states she is in pain cant move pt . Requesting xray of pelvis and left femur through [facility] . Please call back at [phone]. Thanks/dj

## 2022-05-25 NOTE — TELEPHONE ENCOUNTER
Mily notified of verbal order once for xray and informed pt will need to be evaluated in office or virtual visit. Mily to inform pt daughter.

## 2022-05-30 ENCOUNTER — TELEPHONE (OUTPATIENT)
Dept: INTERNAL MEDICINE | Facility: CLINIC | Age: 87
End: 2022-05-30
Payer: MEDICARE

## 2022-05-30 NOTE — TELEPHONE ENCOUNTER
----- Message from Amadou Vidal sent at 5/30/2022  1:53 PM CDT -----  Contact: Sarah/ Physical therapist  Sarah would like a call back at [phone], in regards to discussing pt X-Ray results.

## 2022-06-02 ENCOUNTER — TELEPHONE (OUTPATIENT)
Dept: INTERNAL MEDICINE | Facility: CLINIC | Age: 87
End: 2022-06-02
Payer: MEDICARE

## 2022-06-02 ENCOUNTER — TELEPHONE (OUTPATIENT)
Dept: ORTHOPEDICS | Facility: CLINIC | Age: 87
End: 2022-06-02
Payer: MEDICARE

## 2022-06-02 DIAGNOSIS — S72.402A CLOSED FRACTURE OF DISTAL END OF LEFT FEMUR, UNSPECIFIED FRACTURE MORPHOLOGY, INITIAL ENCOUNTER: Primary | ICD-10-CM

## 2022-06-02 NOTE — PROGRESS NOTES
Attempted to contact patient and patient's daughter to discuss the referral to ortho for scheduling. No answer. Unable to leave VM.

## 2022-06-02 NOTE — TELEPHONE ENCOUNTER
6/1/2022: 1605    The patient had appointment scheduled for follow-up on 06/01/2022.  Daughter showed up for appt without patient and stated that she wanted to consult with someone about getting medical records from specific encounter.  She was also complaining of having multiple phone calls from general [facility] number that she was never able to get anyone when she called back. No one knows why anyone was calling. Explained that it very well could have been either us or patient outreach trying to schedule appts or verify appts for patient since she has not had hospital follow up since admission for pelvic fracture in March (3/27/2022). Daughter poor historian for what is going on with patient other than the fact that she is about to run out of pain medication since getting out of nursing home last week.     Also spoke with PT, suzette mann regarding case since verbal order was given for xray last week. Xray of pelvis and L knee performed on 5/26/2022 which showed the previous impacted distal femur fracture.  Apparently, family was not aware of this fracture and did not discuss this with physical therapy either.  PT reports that patient has been sleeping on the cough of her son, and screams in pain when anyone moves her so she has not been able to perform any therapy on patient, nor did she feel comfortable until stability of fracture was established.  She reports that patient apparently has been on Norco every 6 hours since her fall in March and is about to run out from rx from nursing home. She is currently living with her son, who does not want to move certain furniture in order to get a hospital bed for patient.  PT reached out to  to hopefully help with patient's condition.  Patient did not show for hospital follow-up or orthopedic appointment.  Orthopedic appointment was scheduled on 04/18. She also noted that patient now has bed sore on sacral region. Family are not very willing to move  patient to get to appts or around home due to patient being in pain. Has wheelchair at patient's home, but family has not went get it yet. Discussed possibly trying to get her in with ortho to see if there is anything that can be done. I would prefer to have patient come in for visit so wound could be assessed, but offered virtual to daughter for visit for now. Discussed the processes of virtual visit with daughter.

## 2022-06-02 NOTE — TELEPHONE ENCOUNTER
----- Message from George Salter sent at 6/2/2022  2:47 PM CDT -----  Contact: [phone]  Type:  Patient Returning Call    Who Called:Kinsey   Who Left Message for Patient:nurse  Does the patient know what this is regarding?:appt  Would the patient rather a call back or a response via [facility]? Call back   Best Call Back Number:[phone]   Additional Information:

## 2022-06-02 NOTE — TELEPHONE ENCOUNTER
Spoke with patient's son and rescheduled her missed Orthopedic appointment. Understanding verbalized of appointment date, time and location.

## 2022-06-10 ENCOUNTER — OFFICE VISIT (OUTPATIENT)
Dept: ORTHOPEDICS | Facility: CLINIC | Age: 87
End: 2022-06-10
Payer: MEDICARE

## 2022-06-10 ENCOUNTER — HOSPITAL ENCOUNTER (OUTPATIENT)
Dept: RADIOLOGY | Facility: HOSPITAL | Age: 87
Discharge: HOME | End: 2022-06-10
Attending: STUDENT IN AN ORGANIZED HEALTH CARE EDUCATION/TRAINING PROGRAM
Payer: MEDICARE

## 2022-06-10 VITALS
SYSTOLIC BLOOD PRESSURE: 192 MMHG | BODY MASS INDEX: 16.83 KG/M2 | HEIGHT: 63 IN | HEART RATE: 78 BPM | WEIGHT: 95 LBS | DIASTOLIC BLOOD PRESSURE: 73 MMHG

## 2022-06-10 DIAGNOSIS — M89.8X5 PAIN OF LEFT FEMUR: ICD-10-CM

## 2022-06-10 DIAGNOSIS — S72.402A CLOSED FRACTURE OF DISTAL END OF LEFT FEMUR, UNSPECIFIED FRACTURE MORPHOLOGY, INITIAL ENCOUNTER: ICD-10-CM

## 2022-06-10 PROCEDURE — 73552 X-RAY EXAM OF FEMUR 2/>: CPT | Mod: 26,LT,, | Performed by: RADIOLOGY

## 2022-06-10 PROCEDURE — 99214 OFFICE O/P EST MOD 30 MIN: CPT | Mod: S$GLB,,, | Performed by: PHYSICIAN ASSISTANT

## 2022-06-10 PROCEDURE — 99999 PR PBB SHADOW E&M-EST. PATIENT-LVL III: CPT | Mod: PBBFAC,,, | Performed by: PHYSICIAN ASSISTANT

## 2022-06-10 PROCEDURE — 73552 X-RAY EXAM OF FEMUR 2/>: CPT | Mod: TC,LT

## 2022-06-10 PROCEDURE — 99999 PR PBB SHADOW E&M-EST. PATIENT-LVL III: ICD-10-PCS | Mod: PBBFAC,,, | Performed by: PHYSICIAN ASSISTANT

## 2022-06-10 PROCEDURE — 73552 XR FEMUR 2 VIEW LEFT: ICD-10-PCS | Mod: 26,LT,, | Performed by: RADIOLOGY

## 2022-06-10 PROCEDURE — 99213 OFFICE O/P EST LOW 20 MIN: CPT | Mod: PBBFAC | Performed by: PHYSICIAN ASSISTANT

## 2022-06-10 PROCEDURE — 99214 PR OFFICE/OUTPT VISIT, EST, LEVL IV, 30-39 MIN: ICD-10-PCS | Mod: S$GLB,,, | Performed by: PHYSICIAN ASSISTANT

## 2022-06-10 NOTE — PROGRESS NOTES
"  Subjective:      Patient ID: Pamella Levy is a 89 y.o. female.    Chief Complaint: Pain of the Left Leg      HPI: Pamella Levy is a 89-year-old female in clinic today for follow-up of left distal femur fracture.  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  This occurred about 2 and half months ago.  Patient was seen by us as a consult in the hospital.  Patient was made nonweightbearing with transfers only.  She is doing well at this time.  Reports pain is improved.  She does have a flexion contracture in the knee that was present prior to the injury    Past Medical History:   Diagnosis Date    Arthritis     Cancer     Hypertension     Hypertension 10/25/2019    Myocardial infarction     Peripheral vascular disease 10/25/2019    Stroke     TIA    Thickened endometrium 10/25/2019       Current Outpatient Medications:     amLODIPine (NORVASC) 5 MG tablet, Take 1 tablet (5 mg total) by mouth once daily., Disp: 30 tablet, Rfl: 0    atorvastatin (LIPITOR) 20 MG tablet, TAKE 1 TABLET BY MOUTH EVERY DAY AT NIGHT, Disp: 90 tablet, Rfl: 0    carvediloL (COREG) 3.125 MG tablet, TAKE 1 TABLET BY MOUTH 2 TIMES DAILY., Disp: 90 tablet, Rfl: 0    gabapentin (NEURONTIN) 300 MG capsule, TAKE 1 CAPSULE BY MOUTH EVERY DAY AT NIGHT, Disp: 90 capsule, Rfl: 0    traZODone (DESYREL) 50 MG tablet, TAKE HALF TABLET BY MOUTH NIGHTLY AS NEEDED FOR INSOMNIA OR DEPRESSION, Disp: 15 tablet, Rfl: 1  Review of patient's allergies indicates:   Allergen Reactions    Pregabalin Swelling    Tramadol Hallucinations       BP (!) 192/73   Pulse 78   Ht 5' 3" (1.6 m)   Wt 43.1 kg (95 lb)   LMP  (LMP Unknown)   BMI 16.83 kg/m²     ROS      Objective:    Ortho Exam   Left knee:  No TTP  No edema  Flexion contracture  ROM greatly decreased  Sensation and pulses intact    GEN: Well developed, well nourished female. AAOX3. No acute distress.   Normocephalic, atraumatic.   SAMARA  Breathing unlabored.  Mood " and affect appropriate.        Assessment:     Imaging:  X-ray left femur obtained today shows healed fracture of the distal femur as well as BKA        1. Closed fracture of distal end of left femur, unspecified fracture morphology, initial encounter          Plan:       Reviewed the radiographs with the patient and her daughter.  Explained the only surgical intervention for this would be an above-the-knee amputation.  Patient and the daughter are not interested in surgical intervention at this time.  We will continue to let the patient do transfers and she is nonweightbearing at baseline.  She is working with PT and OT on getting up out of the bed and transfers.  Continue with that.  Return to clinic as needed     Follow up if symptoms worsen or fail to improve.          Patient note was created using MModal Dictation.  Any errors in syntax or even information may not have been identified and edited on initial review prior to signing this note.

## 2022-06-16 ENCOUNTER — TELEPHONE (OUTPATIENT)
Dept: INTERNAL MEDICINE | Facility: CLINIC | Age: 87
End: 2022-06-16
Payer: MEDICARE

## 2022-06-16 NOTE — TELEPHONE ENCOUNTER
----- Message from Radha Cuello sent at 6/16/2022  1:52 PM CDT -----  Pt's daughter Ermelinda would like to schedule an appt for her mother. The first appt isn't until 6/29/2022 and this is for a hospital follow up. Please call her at  [phone]. Thx. EL

## 2022-06-20 ENCOUNTER — DOCUMENT SCAN (OUTPATIENT)
Dept: HOME HEALTH SERVICES | Facility: HOSPITAL | Age: 87
End: 2022-06-20
Payer: MEDICARE

## 2022-06-22 ENCOUNTER — OFFICE VISIT (OUTPATIENT)
Dept: INTERNAL MEDICINE | Facility: CLINIC | Age: 87
End: 2022-06-22
Payer: MEDICARE

## 2022-06-22 VITALS
TEMPERATURE: 98 F | HEIGHT: 63 IN | DIASTOLIC BLOOD PRESSURE: 76 MMHG | OXYGEN SATURATION: 96 % | SYSTOLIC BLOOD PRESSURE: 136 MMHG | BODY MASS INDEX: 16.83 KG/M2 | HEART RATE: 93 BPM

## 2022-06-22 DIAGNOSIS — F32.9 REACTIVE DEPRESSION: ICD-10-CM

## 2022-06-22 DIAGNOSIS — F51.05 INSOMNIA DUE TO OTHER MENTAL DISORDER: ICD-10-CM

## 2022-06-22 DIAGNOSIS — K64.4 EXTERNAL HEMORRHOID: Primary | ICD-10-CM

## 2022-06-22 DIAGNOSIS — F03.90 DEMENTIA WITHOUT BEHAVIORAL DISTURBANCE, UNSPECIFIED DEMENTIA TYPE: Chronic | ICD-10-CM

## 2022-06-22 DIAGNOSIS — I10 PRIMARY HYPERTENSION: ICD-10-CM

## 2022-06-22 DIAGNOSIS — Z89.611 HISTORY OF ABOVE-KNEE AMPUTATION OF RIGHT LOWER EXTREMITY: ICD-10-CM

## 2022-06-22 DIAGNOSIS — F99 INSOMNIA DUE TO OTHER MENTAL DISORDER: ICD-10-CM

## 2022-06-22 DIAGNOSIS — I73.9 PVD (PERIPHERAL VASCULAR DISEASE): ICD-10-CM

## 2022-06-22 PROCEDURE — 99214 OFFICE O/P EST MOD 30 MIN: CPT | Mod: S$GLB,,, | Performed by: NURSE PRACTITIONER

## 2022-06-22 PROCEDURE — 99999 PR PBB SHADOW E&M-EST. PATIENT-LVL III: ICD-10-PCS | Mod: PBBFAC,,, | Performed by: NURSE PRACTITIONER

## 2022-06-22 PROCEDURE — 99999 PR PBB SHADOW E&M-EST. PATIENT-LVL III: CPT | Mod: PBBFAC,,, | Performed by: NURSE PRACTITIONER

## 2022-06-22 PROCEDURE — 99214 PR OFFICE/OUTPT VISIT, EST, LEVL IV, 30-39 MIN: ICD-10-PCS | Mod: S$GLB,,, | Performed by: NURSE PRACTITIONER

## 2022-06-22 PROCEDURE — 99213 OFFICE O/P EST LOW 20 MIN: CPT | Mod: PBBFAC,PO | Performed by: NURSE PRACTITIONER

## 2022-06-22 RX ORDER — HYDROCORTISONE 25 MG/G
CREAM TOPICAL 2 TIMES DAILY
Qty: 28 G | Refills: 3 | Status: SHIPPED | OUTPATIENT
Start: 2022-06-22

## 2022-06-22 RX ORDER — AMLODIPINE BESYLATE 5 MG/1
5 TABLET ORAL DAILY
Qty: 30 TABLET | Refills: 3 | Status: SHIPPED | OUTPATIENT
Start: 2022-06-22 | End: 2023-06-22

## 2022-06-22 RX ORDER — TRAZODONE HYDROCHLORIDE 50 MG/1
25 TABLET ORAL NIGHTLY PRN
Qty: 45 TABLET | Refills: 1 | Status: SHIPPED | OUTPATIENT
Start: 2022-06-22

## 2022-06-22 RX ORDER — ATORVASTATIN CALCIUM 20 MG/1
20 TABLET, FILM COATED ORAL NIGHTLY
Qty: 90 TABLET | Refills: 3 | Status: SHIPPED | OUTPATIENT
Start: 2022-06-22

## 2022-06-22 RX ORDER — CARVEDILOL 3.12 MG/1
3.12 TABLET ORAL 2 TIMES DAILY
Qty: 90 TABLET | Refills: 3 | Status: SHIPPED | OUTPATIENT
Start: 2022-06-22

## 2022-06-22 RX ORDER — DICLOFENAC SODIUM 10 MG/G
GEL TOPICAL
Qty: 200 G | Refills: 3 | Status: SHIPPED | OUTPATIENT
Start: 2022-06-22 | End: 2022-06-22

## 2022-06-22 RX ORDER — ACETAMINOPHEN 325 MG/1
TABLET ORAL
COMMUNITY
Start: 2022-04-12

## 2022-06-22 RX ORDER — DICLOFENAC SODIUM 10 MG/G
GEL TOPICAL
COMMUNITY
End: 2022-06-22 | Stop reason: SDUPTHER

## 2022-06-29 ENCOUNTER — DOCUMENT SCAN (OUTPATIENT)
Dept: HOME HEALTH SERVICES | Facility: HOSPITAL | Age: 87
End: 2022-06-29
Payer: MEDICARE

## 2022-07-06 ENCOUNTER — DOCUMENT SCAN (OUTPATIENT)
Dept: HOME HEALTH SERVICES | Facility: HOSPITAL | Age: 87
End: 2022-07-06
Payer: MEDICARE

## 2022-07-12 ENCOUNTER — DOCUMENT SCAN (OUTPATIENT)
Dept: HOME HEALTH SERVICES | Facility: HOSPITAL | Age: 87
End: 2022-07-12
Payer: MEDICARE

## 2022-07-13 NOTE — PROGRESS NOTES
Subjective:       Patient ID: Pamella Levy is a 89 y.o. female.    Chief Complaint: Hemorrhoids and Medication Refill  Pt reports to clinic with daughter for chronic care visit.  Daughter reports complaints of recurring hemorrhoids.  Denies bleeding.  Complains of pain.  Tried preparation H which has not afforded relief.  Wheelchair bound.  Daughter denies worsening cognition.  No combative behavior.  Total care.    Past Medical History:   Diagnosis Date    Arthritis     Cancer     Hypertension     Hypertension 10/25/2019    Myocardial infarction     Peripheral vascular disease 10/25/2019    Stroke     TIA    Thickened endometrium 10/25/2019     Hypertension  This is a chronic problem. The current episode started more than 1 year ago. The problem is controlled. Pertinent negatives include no blurred vision, chest pain, headaches or shortness of breath. Risk factors for coronary artery disease include post-menopausal state and sedentary lifestyle. Past treatments include angiotensin blockers, calcium channel blockers and beta blockers.     Review of Systems   Constitutional: Negative.    HENT: Negative.    Eyes: Negative for blurred vision.   Respiratory: Negative for cough and shortness of breath.    Cardiovascular: Negative for chest pain.   Gastrointestinal:        Hemorrhoid    Musculoskeletal: Positive for arthralgias, back pain and myalgias.   Skin: Negative.    Neurological: Negative for headaches.   Psychiatric/Behavioral: Positive for confusion.       Objective:      Physical Exam  Vitals reviewed.   HENT:      Head: Normocephalic.      Right Ear: External ear normal.      Left Ear: External ear normal.      Mouth/Throat:      Mouth: Mucous membranes are dry.   Eyes:      Extraocular Movements: Extraocular movements intact.   Cardiovascular:      Rate and Rhythm: Normal rate.      Heart sounds: Normal heart sounds.   Pulmonary:      Effort: Pulmonary effort is normal. No respiratory distress.       Breath sounds: Decreased air movement present.   Musculoskeletal:      Cervical back: Normal range of motion.   Skin:     General: Skin is warm and dry.   Neurological:      Mental Status: She is alert. She is disoriented.   Psychiatric:         Behavior: Behavior normal.         Assessment:       1. External hemorrhoid    2. Reactive depression    3. Insomnia due to other mental disorder    4. Primary hypertension    5. PVD (peripheral vascular disease)    6. Dementia without behavioral disturbance, unspecified dementia type    7. History of above-knee amputation of right lower extremity        Plan:   External hemorrhoid  -     hydrocortisone 2.5 % cream; Apply topically 2 (two) times daily.  Dispense: 28 g; Refill: 3  -discussed prevention.  Stool softner.  If no improvement will refer to colorectal   Reactive depression  -     traZODone (DESYREL) 50 MG tablet; Take 0.5 tablets (25 mg total) by mouth nightly as needed for Insomnia.  Dispense: 45 tablet; Refill: 1    Insomnia due to other mental disorder  -     traZODone (DESYREL) 50 MG tablet; Take 0.5 tablets (25 mg total) by mouth nightly as needed for Insomnia.  Dispense: 45 tablet; Refill: 1    Primary hypertension  -     amLODIPine (NORVASC) 5 MG tablet; Take 1 tablet (5 mg total) by mouth once daily.  Dispense: 30 tablet; Refill: 3  -     carvediloL (COREG) 3.125 MG tablet; Take 1 tablet (3.125 mg total) by mouth 2 (two) times daily.  Dispense: 90 tablet; Refill: 3    PVD (peripheral vascular disease)  -     atorvastatin (LIPITOR) 20 MG tablet; Take 1 tablet (20 mg total) by mouth nightly.  Dispense: 90 tablet; Refill: 3    Dementia without behavioral disturbance, unspecified dementia type    History of above-knee amputation of right lower extremity    Other orders  -     Discontinue: diclofenac sodium (VOLTAREN) 1 % Gel; 1 application  .  Dispense: 200 g; Refill: 3    Stable continue treatment plan.  Follow up with PCP in 3 months  No follow-ups on  file.

## 2022-08-04 ENCOUNTER — DOCUMENT SCAN (OUTPATIENT)
Dept: HOME HEALTH SERVICES | Facility: HOSPITAL | Age: 87
End: 2022-08-04
Payer: MEDICARE

## 2022-08-11 ENCOUNTER — DOCUMENT SCAN (OUTPATIENT)
Dept: HOME HEALTH SERVICES | Facility: HOSPITAL | Age: 87
End: 2022-08-11
Payer: MEDICARE

## 2022-08-12 ENCOUNTER — DOCUMENT SCAN (OUTPATIENT)
Dept: HOME HEALTH SERVICES | Facility: HOSPITAL | Age: 87
End: 2022-08-12
Payer: MEDICARE

## 2022-08-12 ENCOUNTER — TELEPHONE (OUTPATIENT)
Dept: INTERNAL MEDICINE | Facility: CLINIC | Age: 87
End: 2022-08-12
Payer: MEDICARE

## 2022-08-12 ENCOUNTER — HOSPITAL ENCOUNTER (EMERGENCY)
Facility: HOSPITAL | Age: 87
Discharge: HOME | End: 2022-08-12
Attending: EMERGENCY MEDICINE
Payer: MEDICARE

## 2022-08-12 VITALS
HEART RATE: 82 BPM | RESPIRATION RATE: 18 BRPM | BODY MASS INDEX: 17.11 KG/M2 | OXYGEN SATURATION: 97 % | TEMPERATURE: 99 F | DIASTOLIC BLOOD PRESSURE: 78 MMHG | SYSTOLIC BLOOD PRESSURE: 188 MMHG | WEIGHT: 96.56 LBS

## 2022-08-12 DIAGNOSIS — I10 ESSENTIAL HYPERTENSION: ICD-10-CM

## 2022-08-12 DIAGNOSIS — Z20.822 EXPOSURE TO COVID-19 VIRUS: Primary | ICD-10-CM

## 2022-08-12 LAB
CTP QC/QA: YES
SARS-COV-2 RDRP RESP QL NAA+PROBE: NEGATIVE

## 2022-08-12 PROCEDURE — 99283 EMERGENCY DEPT VISIT LOW MDM: CPT | Mod: 25,ER

## 2022-08-12 PROCEDURE — U0002 COVID-19 LAB TEST NON-CDC: HCPCS | Mod: ER | Performed by: EMERGENCY MEDICINE

## 2022-08-12 PROCEDURE — 25000003 PHARM REV CODE 250: Mod: ER | Performed by: EMERGENCY MEDICINE

## 2022-08-12 RX ORDER — CLONIDINE HYDROCHLORIDE 0.1 MG/1
0.1 TABLET ORAL
Status: COMPLETED | OUTPATIENT
Start: 2022-08-12 | End: 2022-08-12

## 2022-08-12 RX ORDER — AMLODIPINE BESYLATE 5 MG/1
5 TABLET ORAL
Status: COMPLETED | OUTPATIENT
Start: 2022-08-12 | End: 2022-08-12

## 2022-08-12 RX ORDER — CARVEDILOL 3.12 MG/1
3.12 TABLET ORAL
Status: DISCONTINUED | OUTPATIENT
Start: 2022-08-12 | End: 2022-08-12 | Stop reason: HOSPADM

## 2022-08-12 RX ADMIN — CLONIDINE HYDROCHLORIDE 0.1 MG: 0.1 TABLET ORAL at 04:08

## 2022-08-12 RX ADMIN — AMLODIPINE BESYLATE 5 MG: 5 TABLET ORAL at 04:08

## 2022-08-12 NOTE — ED PROVIDER NOTES
Encounter Date: 2022       History     Chief Complaint   Patient presents with    COVID-19 Concerns     cough     Patient is a 89-year-old female who presents today for a COVID test.  Patient's son reportedly tested positive at home today with COVID.  He does have a cough.  Patient denies all symptoms.  Family member at bedside reports that she does seem to have a mild cough.  No reports of labored breathing, chest pain, fever, altered mental status, or any other symptoms.  No prior test at home for patient.  No prior treatment.  Symptoms are mild in severity.  Patient has not taken her hypertension medications today        Review of patient's allergies indicates:   Allergen Reactions    Pregabalin Swelling    Tramadol Hallucinations    Gabapentin      Past Medical History:   Diagnosis Date    Arthritis     Cancer     Hypertension     Hypertension 10/25/2019    Myocardial infarction     Peripheral vascular disease 10/25/2019    Stroke     TIA    Thickened endometrium 10/25/2019     Past Surgical History:   Procedure Laterality Date    BKA Left     CARDIAC SURGERY      heart cath     SECTION      x 1    HYSTEROSCOPY WITH DILATION AND CURETTAGE OF UTERUS N/A 2019    Procedure: HYSTEROSCOPY, WITH DILATION AND CURETTAGE OF UTERUS;  Surgeon: Ximena Thorne MD;  Location: [facility];  Service: OB/GYN;  Laterality: N/A;    LEG AMPUTATION      left  - right      Family History   Problem Relation Age of Onset    Hypertension Sister     Stroke Sister     Hypertension Brother     Cancer Sister     Heart disease Brother     Heart attack Brother     Diabetes Brother     Stroke Brother     Heart disease Brother     Diabetes Sister     Stroke Sister     Heart disease Sister      Social History     Tobacco Use    Smoking status: Former Smoker     Types: Cigarettes    Smokeless tobacco: Never Used   Substance Use Topics    Alcohol use: Not Currently    Drug use: Not  Currently     Review of Systems   Constitutional: Negative for chills, diaphoresis and fever.   HENT: Negative for congestion, rhinorrhea and sore throat.    Eyes: Negative for pain, redness and visual disturbance.   Respiratory: Positive for cough (mild). Negative for shortness of breath.    Cardiovascular: Negative for chest pain, palpitations and leg swelling.   Gastrointestinal: Negative for abdominal distention, abdominal pain, blood in stool, constipation, diarrhea, nausea and vomiting.   Genitourinary: Negative for dysuria and hematuria.   Musculoskeletal: Negative for arthralgias and joint swelling.   Skin: Negative for rash and wound.   Neurological: Negative for seizures, syncope and headaches.   All other systems reviewed and are negative.      Physical Exam     Initial Vitals [08/12/22 1547]   BP Pulse Resp Temp SpO2   (!) 242/104 93 18 98.8 °F (37.1 °C) 98 %      MAP       --         Physical Exam    Nursing note and vitals reviewed.  Constitutional: She appears well-developed and well-nourished. No distress.   HENT:   Head: Normocephalic and atraumatic.   Mouth/Throat: Oropharynx is clear and moist.   Eyes: Conjunctivae and EOM are normal. Pupils are equal, round, and reactive to light.   Neck: Neck supple. No tracheal deviation present.   Cardiovascular: Normal rate, regular rhythm, normal heart sounds and intact distal pulses.   Pulmonary/Chest: Breath sounds normal. No respiratory distress.   Abdominal: Abdomen is soft. She exhibits no distension. There is no abdominal tenderness. There is no rebound and no guarding.   Musculoskeletal:         General: No edema.      Cervical back: Neck supple.     Neurological: She is alert and oriented to person, place, and time. GCS score is 15. GCS eye subscore is 4. GCS verbal subscore is 5. GCS motor subscore is 6.   No focal deficits   Skin: Skin is warm. No rash noted. No erythema.   Psychiatric: She has a normal mood and affect. Her behavior is normal.          ED Course   Procedures  Labs Reviewed   SARS-COV-2 RDRP GENE     Results for orders placed or performed during the hospital encounter of 08/12/22   POCT COVID-19 Rapid Screening   Result Value Ref Range    POC Rapid COVID Negative Negative     Acceptable Yes      EKG reviewed interpreted by ED provider as normal sinus rhythm with a rate of 86, normal axis, normal intervals, no ST depression or ST elevation       Imaging Results    None          Medications   carvediloL tablet 3.125 mg (has no administration in time range)   amLODIPine tablet 5 mg (5 mg Oral Given 8/12/22 1621)   cloNIDine tablet 0.1 mg (0.1 mg Oral Given 8/12/22 1621)       Vitals:    08/12/22 1618 08/12/22 1632 08/12/22 1647 08/12/22 1703   BP: (!) 227/93 (!) 223/90 (!) 211/87 (!) 188/78   BP Location:       Patient Position:       Pulse: 86 81 78 82   Resp:       Temp:       TempSrc:       SpO2: 97% 99% 99% 97%   Weight:           Medical Decision Making:   Close exposure to COVID-19.  Patient with minimal to no symptoms at this time.  Tested negative.  Discussed with patient and family that if she worsens or develops any new symptoms she should be retested.  Discussed potential benefit from COVID specific therapies if she does end up with COVID.                      Clinical Impression:   Final diagnoses:  [Z20.822] Exposure to COVID-19 virus (Primary)  [I10] Essential hypertension          ED Disposition Condition    Discharge Stable        ED Prescriptions     None        Follow-up Information     Follow up With Specialties Details Why Contact Info    Gwendolyn Swain NP Family Medicine Call   [address] [facility] [address]  [facility] [address]  [phone]      [facility] - Emergency Dept Emergency Medicine  As needed, If symptoms worsen [id number] [facility] [address]  [facility] [address] [phone]           John Dodge MD  08/12/22 1711       John Dodge MD  08/12/22 1710       John Dodge MD  08/12/22 [id number]

## 2022-08-12 NOTE — TELEPHONE ENCOUNTER
Patient has been exposure to covid, patient is coughing and would like to get tested. Offered patient a virtual visit , declined. Advise that can go to an urgent care to get tested if can not do a virtual

## 2022-08-12 NOTE — TELEPHONE ENCOUNTER
----- Message from Celia Ledesma sent at 8/11/2022  4:44 PM CDT -----  Regarding: Appointment  Good afternoon,  Patient called and stated she would like to be seen in the office tomorrow. Please call patient and advise.     Call back: [phone]

## 2022-08-19 ENCOUNTER — PES CALL (OUTPATIENT)
Dept: ADMINISTRATIVE | Facility: CLINIC | Age: 87
End: 2022-08-19
Payer: MEDICARE

## 2022-10-27 ENCOUNTER — TELEPHONE (OUTPATIENT)
Dept: ADMINISTRATIVE | Facility: HOSPITAL | Age: 87
End: 2022-10-27
Payer: MEDICARE

## (undated) DEVICE — SEE MEDLINE ITEM 146417

## (undated) DEVICE — LUBRICANT SURGILUBE 2 OZ

## (undated) DEVICE — PACK DRAPE PERI/GYN TIBURON

## (undated) DEVICE — PAD ABD 8X10 STERILE

## (undated) DEVICE — SEE L#152161

## (undated) DEVICE — DRESSING TELFA N ADH 3X8

## (undated) DEVICE — SEE MEDLINE ITEM 157027

## (undated) DEVICE — SEE MEDLINE ITEM 157117

## (undated) DEVICE — SEE MEDLINE ITEM 152622

## (undated) DEVICE — ELECTRODE REM PLYHSV RETURN 9

## (undated) DEVICE — SEE MEDLINE ITEM 154981